# Patient Record
Sex: MALE | Race: BLACK OR AFRICAN AMERICAN | NOT HISPANIC OR LATINO | Employment: OTHER | URBAN - METROPOLITAN AREA
[De-identification: names, ages, dates, MRNs, and addresses within clinical notes are randomized per-mention and may not be internally consistent; named-entity substitution may affect disease eponyms.]

---

## 2017-03-13 ENCOUNTER — HOSPITAL ENCOUNTER (OUTPATIENT)
Dept: CARDIOLOGY | Facility: CLINIC | Age: 62
Discharge: HOME | End: 2017-03-13
Payer: MEDICAID

## 2017-03-13 DIAGNOSIS — F10.10 ETOH ABUSE: ICD-10-CM

## 2017-03-13 DIAGNOSIS — R09.89 CARDIAC LV EJECTION FRACTION 10-20%: ICD-10-CM

## 2017-03-13 DIAGNOSIS — I10 ESSENTIAL HYPERTENSION: ICD-10-CM

## 2017-03-13 DIAGNOSIS — R06.02 SOB (SHORTNESS OF BREATH): ICD-10-CM

## 2017-03-13 LAB
DIASTOLIC DYSFUNCTION: YES
ESTIMATED PA SYSTOLIC PRESSURE: 55
MITRAL VALVE REGURGITATION: ABNORMAL
RETIRED EF AND QEF - SEE NOTES: 15 (ref 55–65)
TRICUSPID VALVE REGURGITATION: ABNORMAL

## 2017-03-13 PROCEDURE — 93306 TTE W/DOPPLER COMPLETE: CPT | Mod: PBBFAC | Performed by: INTERNAL MEDICINE

## 2017-03-13 PROCEDURE — 0399T 2D ECHO WITH COLOR FLOW DOPPLER: CPT | Mod: S$PBB,,, | Performed by: INTERNAL MEDICINE

## 2017-11-03 ENCOUNTER — HOSPITAL ENCOUNTER (OUTPATIENT)
Facility: HOSPITAL | Age: 62
Discharge: LEFT BEFORE DISCHARGE | End: 2017-11-05
Attending: EMERGENCY MEDICINE | Admitting: HOSPITALIST
Payer: MEDICAID

## 2017-11-03 DIAGNOSIS — I50.9 CHF (CONGESTIVE HEART FAILURE): ICD-10-CM

## 2017-11-03 DIAGNOSIS — I50.9 CHF EXACERBATION: ICD-10-CM

## 2017-11-03 DIAGNOSIS — I86.8 VARICOSE VEINS OF OTHER SPECIFIED SITES (CODE): ICD-10-CM

## 2017-11-03 DIAGNOSIS — R06.02 SHORTNESS OF BREATH: ICD-10-CM

## 2017-11-03 PROBLEM — D64.9 ANEMIA: Status: ACTIVE | Noted: 2017-11-03

## 2017-11-03 PROBLEM — I83.899 BLEEDING FROM VARICOSE VEIN: Status: ACTIVE | Noted: 2017-11-03

## 2017-11-03 PROBLEM — E87.6 HYPOKALEMIA: Status: ACTIVE | Noted: 2017-11-03

## 2017-11-03 LAB
ABO + RH BLD: NORMAL
ALBUMIN SERPL BCP-MCNC: 3.1 G/DL
ALP SERPL-CCNC: 132 U/L
ALT SERPL W/O P-5'-P-CCNC: 12 U/L
ANION GAP SERPL CALC-SCNC: 10 MMOL/L
AST SERPL-CCNC: 32 U/L
BASOPHILS # BLD AUTO: 0.04 K/UL
BASOPHILS NFR BLD: 0.8 %
BILIRUB SERPL-MCNC: 1.4 MG/DL
BLD GP AB SCN CELLS X3 SERPL QL: NORMAL
BNP SERPL-MCNC: 1658 PG/ML
BUN SERPL-MCNC: 12 MG/DL
CALCIUM SERPL-MCNC: 8.8 MG/DL
CHLORIDE SERPL-SCNC: 107 MMOL/L
CO2 SERPL-SCNC: 21 MMOL/L
CREAT SERPL-MCNC: 1.3 MG/DL
DIFFERENTIAL METHOD: ABNORMAL
EOSINOPHIL # BLD AUTO: 0.1 K/UL
EOSINOPHIL NFR BLD: 2.3 %
ERYTHROCYTE [DISTWIDTH] IN BLOOD BY AUTOMATED COUNT: 14.5 %
EST. GFR  (AFRICAN AMERICAN): >60 ML/MIN/1.73 M^2
EST. GFR  (NON AFRICAN AMERICAN): 57.7 ML/MIN/1.73 M^2
GLUCOSE SERPL-MCNC: 69 MG/DL
HCT VFR BLD AUTO: 33 %
HGB BLD-MCNC: 11.2 G/DL
IMM GRANULOCYTES # BLD AUTO: 0.01 K/UL
IMM GRANULOCYTES NFR BLD AUTO: 0.2 %
LYMPHOCYTES # BLD AUTO: 1 K/UL
LYMPHOCYTES NFR BLD: 20.2 %
MCH RBC QN AUTO: 33 PG
MCHC RBC AUTO-ENTMCNC: 33.9 G/DL
MCV RBC AUTO: 97 FL
MONOCYTES # BLD AUTO: 0.6 K/UL
MONOCYTES NFR BLD: 13.1 %
NEUTROPHILS # BLD AUTO: 3 K/UL
NEUTROPHILS NFR BLD: 63.4 %
NRBC BLD-RTO: 0 /100 WBC
PLATELET # BLD AUTO: 131 K/UL
PMV BLD AUTO: 10.3 FL
POTASSIUM SERPL-SCNC: 3.3 MMOL/L
PROT SERPL-MCNC: 8.1 G/DL
RBC # BLD AUTO: 3.39 M/UL
SODIUM SERPL-SCNC: 138 MMOL/L
TROPONIN I SERPL DL<=0.01 NG/ML-MCNC: 0.06 NG/ML
WBC # BLD AUTO: 4.75 K/UL

## 2017-11-03 PROCEDURE — G0378 HOSPITAL OBSERVATION PER HR: HCPCS

## 2017-11-03 PROCEDURE — 25000003 PHARM REV CODE 250: Performed by: PHYSICIAN ASSISTANT

## 2017-11-03 PROCEDURE — 84484 ASSAY OF TROPONIN QUANT: CPT

## 2017-11-03 PROCEDURE — 83880 ASSAY OF NATRIURETIC PEPTIDE: CPT

## 2017-11-03 PROCEDURE — 99220 PR INITIAL OBSERVATION CARE,LEVL III: CPT | Mod: ,,, | Performed by: PHYSICIAN ASSISTANT

## 2017-11-03 PROCEDURE — 99285 EMERGENCY DEPT VISIT HI MDM: CPT

## 2017-11-03 PROCEDURE — 63600175 PHARM REV CODE 636 W HCPCS: Performed by: PHYSICIAN ASSISTANT

## 2017-11-03 PROCEDURE — 93010 ELECTROCARDIOGRAM REPORT: CPT | Mod: ,,, | Performed by: INTERNAL MEDICINE

## 2017-11-03 PROCEDURE — 99284 EMERGENCY DEPT VISIT MOD MDM: CPT | Mod: ,,, | Performed by: HOSPITALIST

## 2017-11-03 PROCEDURE — 86900 BLOOD TYPING SEROLOGIC ABO: CPT

## 2017-11-03 PROCEDURE — 86901 BLOOD TYPING SEROLOGIC RH(D): CPT

## 2017-11-03 PROCEDURE — 83735 ASSAY OF MAGNESIUM: CPT

## 2017-11-03 PROCEDURE — 85025 COMPLETE CBC W/AUTO DIFF WBC: CPT

## 2017-11-03 PROCEDURE — 80053 COMPREHEN METABOLIC PANEL: CPT

## 2017-11-03 PROCEDURE — 25000003 PHARM REV CODE 250: Performed by: EMERGENCY MEDICINE

## 2017-11-03 PROCEDURE — 93005 ELECTROCARDIOGRAM TRACING: CPT

## 2017-11-03 RX ORDER — FUROSEMIDE 10 MG/ML
40 INJECTION INTRAMUSCULAR; INTRAVENOUS 2 TIMES DAILY
Status: DISCONTINUED | OUTPATIENT
Start: 2017-11-04 | End: 2017-11-05 | Stop reason: HOSPADM

## 2017-11-03 RX ORDER — FUROSEMIDE 40 MG/1
40 TABLET ORAL DAILY
Status: DISCONTINUED | OUTPATIENT
Start: 2017-11-04 | End: 2017-11-04

## 2017-11-03 RX ORDER — ONDANSETRON 2 MG/ML
4 INJECTION INTRAMUSCULAR; INTRAVENOUS EVERY 12 HOURS PRN
Status: DISCONTINUED | OUTPATIENT
Start: 2017-11-03 | End: 2017-11-05 | Stop reason: HOSPADM

## 2017-11-03 RX ORDER — POTASSIUM CHLORIDE 20 MEQ/1
40 TABLET, EXTENDED RELEASE ORAL ONCE
Status: COMPLETED | OUTPATIENT
Start: 2017-11-04 | End: 2017-11-04

## 2017-11-03 RX ORDER — CARVEDILOL 3.12 MG/1
3.12 TABLET ORAL 2 TIMES DAILY
Status: DISCONTINUED | OUTPATIENT
Start: 2017-11-03 | End: 2017-11-05 | Stop reason: HOSPADM

## 2017-11-03 RX ORDER — POTASSIUM CHLORIDE 750 MG/1
10 CAPSULE, EXTENDED RELEASE ORAL DAILY
Status: DISCONTINUED | OUTPATIENT
Start: 2017-11-04 | End: 2017-11-05 | Stop reason: HOSPADM

## 2017-11-03 RX ORDER — FUROSEMIDE 10 MG/ML
80 INJECTION INTRAMUSCULAR; INTRAVENOUS ONCE
Status: COMPLETED | OUTPATIENT
Start: 2017-11-03 | End: 2017-11-03

## 2017-11-03 RX ORDER — SPIRONOLACTONE 25 MG/1
25 TABLET ORAL DAILY
Status: DISCONTINUED | OUTPATIENT
Start: 2017-11-04 | End: 2017-11-05 | Stop reason: HOSPADM

## 2017-11-03 RX ORDER — PRAVASTATIN SODIUM 40 MG/1
40 TABLET ORAL DAILY
Status: DISCONTINUED | OUTPATIENT
Start: 2017-11-04 | End: 2017-11-05 | Stop reason: HOSPADM

## 2017-11-03 RX ORDER — GLUCAGON 1 MG
1 KIT INJECTION
Status: DISCONTINUED | OUTPATIENT
Start: 2017-11-03 | End: 2017-11-05 | Stop reason: HOSPADM

## 2017-11-03 RX ORDER — IBUPROFEN 200 MG
24 TABLET ORAL
Status: DISCONTINUED | OUTPATIENT
Start: 2017-11-03 | End: 2017-11-05 | Stop reason: HOSPADM

## 2017-11-03 RX ORDER — LISINOPRIL 2.5 MG/1
2.5 TABLET ORAL DAILY
Status: DISCONTINUED | OUTPATIENT
Start: 2017-11-04 | End: 2017-11-05 | Stop reason: HOSPADM

## 2017-11-03 RX ORDER — LIDOCAINE HYDROCHLORIDE AND EPINEPHRINE 10; 10 MG/ML; UG/ML
1 INJECTION, SOLUTION INFILTRATION; PERINEURAL ONCE
Status: COMPLETED | OUTPATIENT
Start: 2017-11-03 | End: 2017-11-03

## 2017-11-03 RX ORDER — ONDANSETRON 8 MG/1
8 TABLET, ORALLY DISINTEGRATING ORAL EVERY 8 HOURS PRN
Status: DISCONTINUED | OUTPATIENT
Start: 2017-11-03 | End: 2017-11-05 | Stop reason: HOSPADM

## 2017-11-03 RX ORDER — ACETAMINOPHEN 325 MG/1
650 TABLET ORAL EVERY 4 HOURS PRN
Status: DISCONTINUED | OUTPATIENT
Start: 2017-11-03 | End: 2017-11-05 | Stop reason: HOSPADM

## 2017-11-03 RX ORDER — IBUPROFEN 200 MG
16 TABLET ORAL
Status: DISCONTINUED | OUTPATIENT
Start: 2017-11-03 | End: 2017-11-05 | Stop reason: HOSPADM

## 2017-11-03 RX ADMIN — FUROSEMIDE 80 MG: 10 INJECTION, SOLUTION INTRAVENOUS at 11:11

## 2017-11-03 RX ADMIN — LIDOCAINE HYDROCHLORIDE,EPINEPHRINE BITARTRATE 1 ML: 10; .01 INJECTION, SOLUTION INFILTRATION; PERINEURAL at 08:11

## 2017-11-03 RX ADMIN — CARVEDILOL 3.12 MG: 3.12 TABLET, FILM COATED ORAL at 11:11

## 2017-11-04 PROBLEM — D50.9 IRON DEFICIENCY ANEMIA: Status: ACTIVE | Noted: 2017-11-03

## 2017-11-04 PROBLEM — E87.6 HYPOKALEMIA: Status: RESOLVED | Noted: 2017-11-03 | Resolved: 2017-11-04

## 2017-11-04 PROBLEM — R79.89 ELEVATED TROPONIN: Status: ACTIVE | Noted: 2017-11-04

## 2017-11-04 LAB
ANION GAP SERPL CALC-SCNC: 10 MMOL/L
BUN SERPL-MCNC: 14 MG/DL
CALCIUM SERPL-MCNC: 8.5 MG/DL
CHLORIDE SERPL-SCNC: 105 MMOL/L
CO2 SERPL-SCNC: 24 MMOL/L
CREAT SERPL-MCNC: 1.3 MG/DL
EST. GFR  (AFRICAN AMERICAN): >60 ML/MIN/1.73 M^2
EST. GFR  (NON AFRICAN AMERICAN): 57.7 ML/MIN/1.73 M^2
FERRITIN SERPL-MCNC: 38 NG/ML
FOLATE SERPL-MCNC: 9.4 NG/ML
GLUCOSE SERPL-MCNC: 99 MG/DL
INR PPP: 1.4
IRON SERPL-MCNC: 65 UG/DL
MAGNESIUM SERPL-MCNC: 1.6 MG/DL
MAGNESIUM SERPL-MCNC: 1.7 MG/DL
POTASSIUM SERPL-SCNC: 3.6 MMOL/L
PROTHROMBIN TIME: 14.6 SEC
SATURATED IRON: 14 %
SODIUM SERPL-SCNC: 139 MMOL/L
TOTAL IRON BINDING CAPACITY: 462 UG/DL
TRANSFERRIN SERPL-MCNC: 312 MG/DL
TROPONIN I SERPL DL<=0.01 NG/ML-MCNC: 0.04 NG/ML
TROPONIN I SERPL DL<=0.01 NG/ML-MCNC: 0.06 NG/ML
VIT B12 SERPL-MCNC: 468 PG/ML

## 2017-11-04 PROCEDURE — 83540 ASSAY OF IRON: CPT

## 2017-11-04 PROCEDURE — 83735 ASSAY OF MAGNESIUM: CPT

## 2017-11-04 PROCEDURE — 63600175 PHARM REV CODE 636 W HCPCS: Performed by: PHYSICIAN ASSISTANT

## 2017-11-04 PROCEDURE — 36415 COLL VENOUS BLD VENIPUNCTURE: CPT

## 2017-11-04 PROCEDURE — 80048 BASIC METABOLIC PNL TOTAL CA: CPT

## 2017-11-04 PROCEDURE — 35226 REPAIR BLOOD VESSEL DIR LXTR: CPT

## 2017-11-04 PROCEDURE — G0378 HOSPITAL OBSERVATION PER HR: HCPCS

## 2017-11-04 PROCEDURE — 84484 ASSAY OF TROPONIN QUANT: CPT | Mod: 91

## 2017-11-04 PROCEDURE — 25000003 PHARM REV CODE 250: Performed by: PHYSICIAN ASSISTANT

## 2017-11-04 PROCEDURE — 82746 ASSAY OF FOLIC ACID SERUM: CPT

## 2017-11-04 PROCEDURE — 82728 ASSAY OF FERRITIN: CPT

## 2017-11-04 PROCEDURE — 85610 PROTHROMBIN TIME: CPT

## 2017-11-04 PROCEDURE — 82607 VITAMIN B-12: CPT

## 2017-11-04 PROCEDURE — 99225 PR SUBSEQUENT OBSERVATION CARE,LEVEL II: CPT | Mod: ,,, | Performed by: PHYSICIAN ASSISTANT

## 2017-11-04 PROCEDURE — 84484 ASSAY OF TROPONIN QUANT: CPT

## 2017-11-04 RX ORDER — HEPARIN SODIUM 5000 [USP'U]/ML
5000 INJECTION, SOLUTION INTRAVENOUS; SUBCUTANEOUS EVERY 8 HOURS
Status: DISCONTINUED | OUTPATIENT
Start: 2017-11-04 | End: 2017-11-05 | Stop reason: HOSPADM

## 2017-11-04 RX ORDER — FERROUS SULFATE 325(65) MG
325 TABLET, DELAYED RELEASE (ENTERIC COATED) ORAL DAILY
Status: DISCONTINUED | OUTPATIENT
Start: 2017-11-04 | End: 2017-11-05 | Stop reason: HOSPADM

## 2017-11-04 RX ADMIN — FERROUS SULFATE TAB EC 325 MG (65 MG FE EQUIVALENT) 325 MG: 325 (65 FE) TABLET DELAYED RESPONSE at 02:11

## 2017-11-04 RX ADMIN — POTASSIUM CHLORIDE 10 MEQ: 750 CAPSULE, EXTENDED RELEASE ORAL at 09:11

## 2017-11-04 RX ADMIN — LISINOPRIL 2.5 MG: 2.5 TABLET ORAL at 09:11

## 2017-11-04 RX ADMIN — CARVEDILOL 3.12 MG: 3.12 TABLET, FILM COATED ORAL at 09:11

## 2017-11-04 RX ADMIN — FUROSEMIDE 40 MG: 10 INJECTION, SOLUTION INTRAVENOUS at 05:11

## 2017-11-04 RX ADMIN — HEPARIN SODIUM 5000 UNITS: 5000 INJECTION, SOLUTION INTRAVENOUS; SUBCUTANEOUS at 09:11

## 2017-11-04 RX ADMIN — ACETAMINOPHEN 650 MG: 325 TABLET ORAL at 09:11

## 2017-11-04 RX ADMIN — SPIRONOLACTONE 25 MG: 25 TABLET, FILM COATED ORAL at 09:11

## 2017-11-04 RX ADMIN — PRAVASTATIN SODIUM 40 MG: 40 TABLET ORAL at 09:11

## 2017-11-04 RX ADMIN — POTASSIUM CHLORIDE 40 MEQ: 1500 TABLET, EXTENDED RELEASE ORAL at 04:11

## 2017-11-04 RX ADMIN — FUROSEMIDE 40 MG: 10 INJECTION, SOLUTION INTRAVENOUS at 09:11

## 2017-11-04 NOTE — H&P
[facility] Hospital Medicine  History & Physical    Patient Name: Jovan Hernandez  MRN: [id number]  Admission Date: 11/3/2017  Attending Physician: Estrellita Daly MD   Primary Care Provider: Eusebia Brady NP    [facility] Medicine Team: [facility] MED  Timothy Singer PA-C     Patient information was obtained from patient, past medical records and ER records.     Subjective:     Principal Problem:Acute exacerbation of CHF (congestive heart failure)    Chief Complaint:   Chief Complaint   Patient presents with    Leg Injury     pt presents to the ed with bleeding from the leg        HPI: Jovan Hernandez is a 62M with PMHx of HTN, CAD, combined CHF, aortic dissection s/p repair who presents for evaluation of bleeding varicose vein and was subsequently noted to be fluid overloaded by EDMD. In brief, patient had spontaneous rupture of varicose vein today at lunch. He had difficulty controlling bleeding and presented to ED. This has occurred previously and resolved with pressure. Denies taking NSAIDS, ASA, no thinners. He was sutured in the ED, tourniquet was applied and bleeding stopped.     The patient was noted to be fluid overloaded by EDMD and when questioned about any other symptoms he did endorse some SOB, FOWLER, and orthopnea. He does not overtly complain of these things however. He has been out of his CHF medications for several months 2/2 running out of his prescriptions. He is not compliant with dietary restrictions. He denies any CP, NVD, fever, chills, cough, dysuria.    Past Medical History:   Diagnosis Date    Aortic dissection     [place] Type A     Cardiac defibrillator in place     CHF (congestive heart failure) 12/15/14    EF 15-20%    Coronary artery disease     ETOH abuse     Hepatitis C antibody test positive     Hypertension     S/P aortic dissection repair 2011       Past Surgical History:   Procedure Laterality Date    CORONARY ARTERY BYPASS GRAFT         Review  of patient's allergies indicates:  No Known Allergies    No current facility-administered medications on file prior to encounter.      Current Outpatient Prescriptions on File Prior to Encounter   Medication Sig    carvedilol (COREG) 3.125 MG tablet Take 1 tablet (3.125 mg total) by mouth 2 (two) times daily.    furosemide (LASIX) 40 MG tablet Take one pill twice a day. Take one additional dose if weight increases more than 2 lbs in one day.    lisinopril (PRINIVIL,ZESTRIL) 2.5 MG tablet Take 1 tablet (2.5 mg total) by mouth once daily.    pravastatin (PRAVACHOL) 40 MG tablet Take 1 tablet (40 mg total) by mouth once daily.    spironolactone (ALDACTONE) 25 MG tablet Take 1 tablet (25 mg total) by mouth once daily.     Family History     None        Social History Main Topics    Smoking status: Current Every Day Smoker    Smokeless tobacco: Never Used    Alcohol use 0.0 oz/week     1 - 2 drink(s) per week    Drug use: Unknown    Sexual activity: Not on file     Review of Systems   Constitutional: Negative for chills, diaphoresis and fever.   Respiratory: Positive for shortness of breath. Negative for cough and wheezing.    Cardiovascular: Positive for leg swelling. Negative for chest pain and palpitations.   Gastrointestinal: Positive for abdominal distention. Negative for abdominal pain, constipation, nausea and vomiting.   Genitourinary: Negative for difficulty urinating and dysuria.   Musculoskeletal: Negative for arthralgias and back pain.   Skin: Positive for wound. Negative for rash.   Neurological: Negative for dizziness, tremors, weakness and headaches.   Hematological: Bruises/bleeds easily.   Psychiatric/Behavioral: Negative for agitation and confusion.     Objective:     Vital Signs (Most Recent):  Temp: 97.6 °F (36.4 °C) (11/03/17 2235)  Pulse: 91 (11/03/17 2235)  Resp: 18 (11/03/17 2235)  BP: (!) 132/97 (11/03/17 2235)  SpO2: 100 % (11/03/17 2235) Vital Signs (24h Range):  Temp:  [97.6 °F (36.4  °C)-98.2 °F (36.8 °C)] 97.6 °F (36.4 °C)  Pulse:  [86-92] 91  Resp:  [18] 18  SpO2:  [98 %-100 %] 100 %  BP: (129-136)/(86-97) 132/97     Weight: 68 kg (150 lb)  Body mass index is 23.49 kg/m².    Physical Exam   Constitutional: He is oriented to person, place, and time. He appears well-developed and well-nourished. No distress.   HENT:   Head: Normocephalic and atraumatic.   Eyes: EOM are normal. Pupils are equal, round, and reactive to light.   Neck: Normal range of motion. Neck supple. JVD (to mandible) present.   Cardiovascular: Normal rate and regular rhythm.    Murmur heard.  Pulmonary/Chest: Effort normal and breath sounds normal. No respiratory distress. He has no wheezes.   Bibasilar crackles   Abdominal: Soft. Bowel sounds are normal. He exhibits distension. There is no tenderness. There is no guarding.   Musculoskeletal: Normal range of motion. He exhibits edema (3+ pitting BLE).   Neurological: He is alert and oriented to person, place, and time. No cranial nerve deficit.   Skin: Skin is warm and dry. He is not diaphoretic.   tourniquet applied to left thigh, no bleeding through bandage   Psychiatric: He has a normal mood and affect. His behavior is normal. Judgment and thought content normal.   Nursing note and vitals reviewed.       Significant Labs:   CBC:   Recent Labs  Lab 11/03/17 2016   WBC 4.75   HGB 11.2*   HCT 33.0*   *     CMP:   Recent Labs  Lab 11/03/17 2016      K 3.3*      CO2 21*   GLU 69*   BUN 12   CREATININE 1.3   CALCIUM 8.8   PROT 8.1   ALBUMIN 3.1*   BILITOT 1.4*   ALKPHOS 132   AST 32   ALT 12   ANIONGAP 10   EGFRNONAA 57.7*     Cardiac Markers:   Recent Labs  Lab 11/03/17 2016   BNP 1,658*     Troponin:   Recent Labs  Lab 11/03/17 2016   TROPONINI 0.057*     TSH: No results for input(s): TSH in the last 4320 hours.  Urine Culture: No results for input(s): LABURIN in the last 48 hours.  Urine Studies: No results for input(s): COLORU, APPEARANCEUA, PHUR,  SPECGRAV, PROTEINUA, GLUCUA, KETONESU, BILIRUBINUA, OCCULTUA, NITRITE, UROBILINOGEN, LEUKOCYTESUR, RBCUA, WBCUA, BACTERIA, SQUAMEPITHEL, HYALINECASTS in the last 48 hours.    Invalid input(s): ANI    Significant Imaging: CXR: I have reviewed all pertinent results/findings within the past 24 hours and my personal findings are:  no acute findings  EKG: I have reviewed all pertinent results/findings within the past 24 hours and my personal findings are: no ischemic changes    Assessment/Plan:     * Acute exacerbation of CHF (congestive heart failure)    - Patient with subjective SOB in setting of non-compliance with medications for several months.   - Grossly fluid overloaded on exam. +JVD, + abd distension, 3+ pitting edema, endorses mild SOB, FOWLER, orthopnea but not reason for presentation today. He does not acutely complain of the above. CXR without significant edema, VSS, good on RA  - give lasix 80 mg IV now, continue 40 mv IV BID tomorrow, transition to oral once euvolemic (home dose lasix 40 mg daily)  - monitor improvement with daily weights, strict I/Os  - last ECHO 03/2017: EF 15%, +DD, PAP 55  - continue BB, ACE        Bleeding from varicose vein    - controlled in ED, reason for presentation  - if bleeding re-occurs will need vascular consult  - suture removal in 7-10 days from 11/3  - check PT/INR given Hep C        Hypokalemia    - replace, check Mg  - start daily supplementation given aggressive diuresis        Anemia    - check iron panel, B12, folate  - last H/H from 2 years ago, 13.3/39, uknown baseline  - on admit 11.2/33  - HDS, do not suspect substantial blood loss from vein bleeding        Non compliance w medication regimen    - PCP follow up, send out with Rx for HF meds        CAD (coronary artery disease)    - no CP, + mild SOB, EKG without new ischemic changes  - trop at 0.057, but appears his baseline from 2 years ago (0.034)  - follow in AM to verify flat/downtrend        Hypertension     - controlled on admit          VTE Risk Mitigation         Ordered     Medium Risk of VTE  Once      11/03/17 2250     Low Risk of VTE  Once      11/03/17 2250             Timothy Singer PA-C  Department of Hospital Medicine   [facility]

## 2017-11-04 NOTE — ED PROVIDER NOTES
Encounter Date: 11/3/2017       History     Chief Complaint   Patient presents with    Leg Injury     pt presents to the ed with bleeding from the leg     This is a 62-year-old male with history of aortic dissection, CHF with EF of 15-20% with AICD in place, CAD, alcohol abuse and hepatitis C presenting to the ER brought in by EMS for varicose vein bleeding from left leg that started acutely while patient was eating dinner this afternoon.  No recent,.  Per EMS on arrival patient had active pooling of blood was spurting out of the area called and was applied.  Patient was estimated to lose approximately 2 50 cc of blood.  On review of system patient also endorses shortness of breath for the past few weeks which she states is getting worse.  He has not been taking antiacids medications for past few months.  Patient is a very poor historian much history is given per his daughter.  Patient has a history of 1 prior varicose bleed which stopped with compression at home.  Patient never sought medical attention for this in the past.  He is not currently in any blood thinners.          Review of patient's allergies indicates:  No Known Allergies  Past Medical History:   Diagnosis Date    Aortic dissection     [place] Type A     Cardiac defibrillator in place     CHF (congestive heart failure) 12/15/14    EF 15-20%    Coronary artery disease     ETOH abuse     Hepatitis C antibody test positive     Hypertension     S/P aortic dissection repair 2011     Past Surgical History:   Procedure Laterality Date    CORONARY ARTERY BYPASS GRAFT       History reviewed. No pertinent family history.  Social History   Substance Use Topics    Smoking status: Current Every Day Smoker    Smokeless tobacco: Never Used    Alcohol use 0.0 oz/week     1 - 2 drink(s) per week     Review of Systems   Constitutional: Negative for fever.   HENT: Negative for sore throat.    Respiratory: Positive for shortness of breath. Negative for  wheezing and stridor.    Cardiovascular: Negative for chest pain.   Gastrointestinal: Negative for nausea.   Genitourinary: Negative for dysuria.   Musculoskeletal: Negative for back pain.   Skin: Negative for rash.   Neurological: Negative for weakness.   Hematological: Bruises/bleeds easily.       Physical Exam     Initial Vitals [11/03/17 1957]   BP Pulse Resp Temp SpO2   (!) 136/92 86 18 98.2 °F (36.8 °C) 100 %      MAP       106.67         Physical Exam    Constitutional: He is not diaphoretic. No distress.   Chronically ill-appearing male in no acute distress   HENT:   Head: Normocephalic and atraumatic.   Mouth/Throat: Oropharynx is clear and moist.   Eyes: Conjunctivae and EOM are normal. Pupils are equal, round, and reactive to light.   Neck: Normal range of motion. Neck supple.   Cardiovascular: Normal rate, regular rhythm, normal heart sounds and intact distal pulses. Exam reveals no gallop and no friction rub.    No murmur heard.  AICD in place   Pulmonary/Chest: Breath sounds normal. No respiratory distress. He has no wheezes. He has no rhonchi. He has no rales.   Abdominal: Soft. Bowel sounds are normal. He exhibits distension. There is no tenderness. There is no rebound and no guarding.   Musculoskeletal: Normal range of motion. He exhibits no edema or tenderness.   Active bleeding from left varicose vein to lateral aspect of left leg behind knee     Neurological: He is alert and oriented to person, place, and time. He has normal strength. No cranial nerve deficit or sensory deficit.   Skin: Skin is warm and dry. No rash noted. No pallor.         ED Course   Lac Repair  Date/Time: 11/4/2017 6:40 AM  Performed by: ADILIA KELSEY  Authorized by: AXEL GIBSON   Consent Done: Emergent Situation  Wound length (cm): active bleeding for varicose vein.  Foreign bodies: no foreign bodies  Vascular damage: yes    Anesthesia:  Local Anesthetic: lidocaine 2% with epinephrine  Anesthetic total: 4  mL  Patient sedated: no  Preparation: Patient was prepped and draped in the usual sterile fashion.  Fascia closure: 4-0 Vicryl  Number of sutures: 1 (figure eight)  Technique: complex  Approximation: close  Approximation difficulty: complex  Dressing: quick clot and coban.  Patient tolerance: Patient tolerated the procedure well with no immediate complications        Labs Reviewed   CBC W/ AUTO DIFFERENTIAL - Abnormal; Notable for the following:        Result Value    RBC 3.39 (*)     Hemoglobin 11.2 (*)     Hematocrit 33.0 (*)     MCH 33.0 (*)     Platelets 131 (*)     All other components within normal limits   COMPREHENSIVE METABOLIC PANEL - Abnormal; Notable for the following:     Potassium 3.3 (*)     CO2 21 (*)     Glucose 69 (*)     Albumin 3.1 (*)     Total Bilirubin 1.4 (*)     eGFR if non  57.7 (*)     All other components within normal limits   TROPONIN I - Abnormal; Notable for the following:     Troponin I 0.057 (*)     All other components within normal limits   B-TYPE NATRIURETIC PEPTIDE - Abnormal; Notable for the following:     BNP 1,658 (*)     All other components within normal limits   TYPE & SCREEN     EKG Readings: (Independently Interpreted)   Initial Reading: No STEMI. Previous EKG: Compared with most recent EKG Heart Rate: 95.       X-Rays:   Independently Interpreted Readings:   Other Readings:  Chest x-ray: Cardiomegaly, no pulmonary edema    Medical Decision Making:   History:   Old Medical Records: I decided to obtain old medical records.  Old Records Summarized: records from previous admission(s).  Independently Interpreted Test(s):   I have ordered and independently interpreted X-rays - see prior notes.  I have ordered and independently interpreted EKG Reading(s) - see prior notes  Clinical Tests:   Lab Tests: Ordered and Reviewed  Radiological Study: Ordered and Reviewed  Medical Tests: Ordered and Reviewed  Other:   I have discussed this case with another health care  provider.       <> Summary of the Discussion: Hospital medicine       APC / Resident Notes:   This is a 62-year-old gentleman presenting with varicose vein bleed from left leg.  On arrival to the ER bleeding was controlled with coban.  Coban was removed and patient had active bleed from puncture site in varicose vein.  Pressure was applied and vein was grabbed with Keli clamps and figure-of-eight suture with Vicryl was placed bleeding was controlled thereafter and crit clot was applied with Coban dressing.  Patient was also worked up for CHF exacerbation given he has been off his medications for several weeks and has worsening shortness of breath.  Chest x-ray notable for pulmonary edema, BNP acutely elevated from baseline in the 1000's.  No acute ischemic changes on EKG were noted.  Patient is admitted to medicine for CHF exacerbation and will need to be consulted to vascular in the morning for evaluation of recurrent very close vein bleeding.       Scribe Attestation:   Comments: I, Dr. Maday Marino, personally performed the services described in this documentation. All medical record entries made by the scribe were at my direction and in my presence.  I have reviewed the chart and agree that the record reflects my personal performance and is accurate and complete. Maday Marino MD.  5:31 PM 11/05/2017    Attending Attestation:   Physician Attestation Statement for Resident:  As the supervising MD   Physician Attestation Statement: I have personally seen and examined this patient.   I agree with the above history. -:   As the supervising MD I agree with the above PE.   -: Gen.: No acute distress  Lungs: Clear to auscultation  Cardiac: Normal  Abdomen: Distended, soft  Extremity: Bleeding varicose vein in the left lateral leg   As the supervising MD I agree with the above treatment, course, plan, and disposition.   -: Labs reviewed significant for elevated BNP and troponin likely secondary to hypervolemia.   Patient will be placed in observation for diuresis  I have reviewed and agree with the residents interpretation of the following: lab data, EKG and x-rays.                    ED Course      Clinical Impression:   Diagnoses of Shortness of breath, CHF exacerbation, and CHF (congestive heart failure) were pertinent to this visit.    Disposition:   Disposition: Admitted  Condition: Jefferson Vega MD  Resident  11/04/17 0643       Maday Marino MD  11/05/17 7057

## 2017-11-04 NOTE — ASSESSMENT & PLAN NOTE
In setting of non-compliance with medications for several months  - Grossly fluid overloaded on admit; +JVD, + abd distension, 3+ pitting edema, endorses mild SOB, FOWLER, orthopnea but not reason for presentation today. He does not acutely complain of the above.   - CXR without significant edema, VSS, good on RA  - BNP 1658  - Lasix 80 mg IV on admit  - Continue 40 mv IV BID. Plan to transition to oral once euvolemic (home dose lasix 40 mg daily)  - Monitor improvement with daily weights, strict I/Os  - Last ECHO 03/2017: EF 15%, +DD, PAP 55; ordered repeat  - Continue BB, ACE

## 2017-11-04 NOTE — ASSESSMENT & PLAN NOTE
- controlled in ED, reason for presentation  - if bleeding re-occurs will need vascular consult  - suture removal in 7-10 days from 11/3  - check PT/INR given Hep C

## 2017-11-04 NOTE — ED TRIAGE NOTES
"Jovan Hernandez, a 62 y.o. male presents to the ED arriving by  EMS from home complaining of bleeding from the back of his left knee. Pt states he was eating dinner when he noticed "blood spraying across the room." EMS states they believe he has lost around 250mL of blood.     Chief Complaint   Patient presents with    Leg Injury     pt presents to the ed with bleeding from the leg     Review of patient's allergies indicates:  No Known Allergies  Past Medical History:   Diagnosis Date    Aortic dissection     [place] Type A     Cardiac defibrillator in place     CHF (congestive heart failure) 12/15/14    EF 15-20%    Coronary artery disease     ETOH abuse     Hepatitis C antibody test positive     Hypertension     S/P aortic dissection repair 2011       "

## 2017-11-04 NOTE — SUBJECTIVE & OBJECTIVE
Past Medical History:   Diagnosis Date    Aortic dissection     [place] Type A     Cardiac defibrillator in place     CHF (congestive heart failure) 12/15/14    EF 15-20%    Coronary artery disease     ETOH abuse     Hepatitis C antibody test positive     Hypertension     S/P aortic dissection repair 2011       Past Surgical History:   Procedure Laterality Date    CORONARY ARTERY BYPASS GRAFT         Review of patient's allergies indicates:  No Known Allergies    No current facility-administered medications on file prior to encounter.      Current Outpatient Prescriptions on File Prior to Encounter   Medication Sig    carvedilol (COREG) 3.125 MG tablet Take 1 tablet (3.125 mg total) by mouth 2 (two) times daily.    furosemide (LASIX) 40 MG tablet Take one pill twice a day. Take one additional dose if weight increases more than 2 lbs in one day.    lisinopril (PRINIVIL,ZESTRIL) 2.5 MG tablet Take 1 tablet (2.5 mg total) by mouth once daily.    pravastatin (PRAVACHOL) 40 MG tablet Take 1 tablet (40 mg total) by mouth once daily.    spironolactone (ALDACTONE) 25 MG tablet Take 1 tablet (25 mg total) by mouth once daily.     Family History     None        Social History Main Topics    Smoking status: Current Every Day Smoker    Smokeless tobacco: Never Used    Alcohol use 0.0 oz/week     1 - 2 drink(s) per week    Drug use: Unknown    Sexual activity: Not on file     Review of Systems   Constitutional: Negative for chills, diaphoresis and fever.   Respiratory: Positive for shortness of breath. Negative for cough and wheezing.    Cardiovascular: Positive for leg swelling. Negative for chest pain and palpitations.   Gastrointestinal: Positive for abdominal distention. Negative for abdominal pain, constipation, nausea and vomiting.   Genitourinary: Negative for difficulty urinating and dysuria.   Musculoskeletal: Negative for arthralgias and back pain.   Skin: Positive for wound. Negative for rash.    Neurological: Negative for dizziness, tremors, weakness and headaches.   Hematological: Bruises/bleeds easily.   Psychiatric/Behavioral: Negative for agitation and confusion.     Objective:     Vital Signs (Most Recent):  Temp: 97.6 °F (36.4 °C) (11/03/17 2235)  Pulse: 91 (11/03/17 2235)  Resp: 18 (11/03/17 2235)  BP: (!) 132/97 (11/03/17 2235)  SpO2: 100 % (11/03/17 2235) Vital Signs (24h Range):  Temp:  [97.6 °F (36.4 °C)-98.2 °F (36.8 °C)] 97.6 °F (36.4 °C)  Pulse:  [86-92] 91  Resp:  [18] 18  SpO2:  [98 %-100 %] 100 %  BP: (129-136)/(86-97) 132/97     Weight: 68 kg (150 lb)  Body mass index is 23.49 kg/m².    Physical Exam   Constitutional: He is oriented to person, place, and time. He appears well-developed and well-nourished. No distress.   HENT:   Head: Normocephalic and atraumatic.   Eyes: EOM are normal. Pupils are equal, round, and reactive to light.   Neck: Normal range of motion. Neck supple. JVD (to mandible) present.   Cardiovascular: Normal rate and regular rhythm.    Murmur heard.  Pulmonary/Chest: Effort normal and breath sounds normal. No respiratory distress. He has no wheezes.   Bibasilar crackles   Abdominal: Soft. Bowel sounds are normal. He exhibits distension. There is no tenderness. There is no guarding.   Musculoskeletal: Normal range of motion. He exhibits edema (3+ pitting BLE).   Neurological: He is alert and oriented to person, place, and time. No cranial nerve deficit.   Skin: Skin is warm and dry. He is not diaphoretic.   tourniquet applied to left thigh, no bleeding through bandage   Psychiatric: He has a normal mood and affect. His behavior is normal. Judgment and thought content normal.   Nursing note and vitals reviewed.       Significant Labs:   CBC:   Recent Labs  Lab 11/03/17 2016   WBC 4.75   HGB 11.2*   HCT 33.0*   *     CMP:   Recent Labs  Lab 11/03/17 2016      K 3.3*      CO2 21*   GLU 69*   BUN 12   CREATININE 1.3   CALCIUM 8.8   PROT 8.1   ALBUMIN  3.1*   BILITOT 1.4*   ALKPHOS 132   AST 32   ALT 12   ANIONGAP 10   EGFRNONAA 57.7*     Cardiac Markers:   Recent Labs  Lab 11/03/17 2016   BNP 1,658*     Troponin:   Recent Labs  Lab 11/03/17 2016   TROPONINI 0.057*     TSH: No results for input(s): TSH in the last 4320 hours.  Urine Culture: No results for input(s): LABURIN in the last 48 hours.  Urine Studies: No results for input(s): COLORU, APPEARANCEUA, PHUR, SPECGRAV, PROTEINUA, GLUCUA, KETONESU, BILIRUBINUA, OCCULTUA, NITRITE, UROBILINOGEN, LEUKOCYTESUR, RBCUA, WBCUA, BACTERIA, SQUAMEPITHEL, HYALINECASTS in the last 48 hours.    Invalid input(s): WRIGHTSUR    Significant Imaging: CXR: I have reviewed all pertinent results/findings within the past 24 hours and my personal findings are:  no acute findings  EKG: I have reviewed all pertinent results/findings within the past 24 hours and my personal findings are: no ischemic changes

## 2017-11-04 NOTE — ASSESSMENT & PLAN NOTE
- Pt denies chest pain; mild SOB in setting of CHF exacerbation  - EKG without new ischemic changes

## 2017-11-04 NOTE — ASSESSMENT & PLAN NOTE
- Last H/H from 2 years ago, 13.3/39, [facility] baseline  - 11.2/33 on admit  - HDS, do not suspect substantial blood loss from vein bleeding  - B12 and folate WNL  - Will trend daily

## 2017-11-04 NOTE — SUBJECTIVE & OBJECTIVE
Interval History: No acute events overnight. This AM, pt sitting upright in bed resting comfortably. Pt has no complaints at this time. Discussed plan of care.    Review of Systems   Constitutional: Negative for chills, diaphoresis and fever.   Respiratory: Positive for shortness of breath. Negative for cough and wheezing.    Cardiovascular: Positive for leg swelling. Negative for chest pain and palpitations.   Gastrointestinal: Positive for abdominal distention. Negative for abdominal pain, constipation, nausea and vomiting.   Skin: Positive for wound. Negative for rash.   Hematological: Bruises/bleeds easily.     Objective:     Vital Signs (Most Recent):  Temp: 98.5 °F (36.9 °C) (11/04/17 1540)  Pulse: 82 (11/04/17 1540)  Resp: 16 (11/04/17 0755)  BP: 106/70 (11/04/17 1540)  SpO2: 95 % (11/04/17 1540) Vital Signs (24h Range):  Temp:  [97.6 °F (36.4 °C)-98.5 °F (36.9 °C)] 98.5 °F (36.9 °C)  Pulse:  [79-93] 82  Resp:  [16-18] 16  SpO2:  [95 %-100 %] 95 %  BP: (106-136)/(69-97) 106/70     Weight: 68 kg (150 lb)  Body mass index is 23.49 kg/m².    Intake/Output Summary (Last 24 hours) at 11/04/17 1710  Last data filed at 11/04/17 1356   Gross per 24 hour   Intake              610 ml   Output             1900 ml   Net            -1290 ml      Physical Exam   Constitutional: He is oriented to person, place, and time. He appears well-developed and well-nourished. No distress.   Neck: Normal range of motion. Neck supple. JVD (to mandible) present.   Cardiovascular: Normal rate and regular rhythm.    Murmur heard.  Pulmonary/Chest: Effort normal and breath sounds normal. No respiratory distress. He has no wheezes.   Bibasilar crackles   Abdominal: Soft. Bowel sounds are normal. He exhibits distension (improving). There is no tenderness. There is no guarding.   Musculoskeletal: Normal range of motion. He exhibits edema (improved).   Neurological: He is alert and oriented to person, place, and time. No cranial nerve deficit.    Skin: Skin is warm and dry. He is not diaphoretic.   tourniquet applied to left thigh, no bleeding through bandage   Psychiatric: He has a normal mood and affect. His behavior is normal. Judgment and thought content normal.   Nursing note and vitals reviewed.      Significant Labs: All pertinent labs within the past 24 hours have been reviewed.    Significant Imaging: I have reviewed all pertinent imaging results/findings within the past 24 hours.

## 2017-11-04 NOTE — PROGRESS NOTES
[facility] Hospital Medicine  Progress Note    Patient Name: Jovan Hernandez  MRN: [id number]  Patient Class: OP- Observation   Admission Date: 11/3/2017  Length of Stay: 0 days  Attending Physician: Estrellita Daly MD  Primary Care Provider: Eusebia Brady NP    [facility] Medicine Team: [facility] MED F Monique Rivas PA-C    Subjective:     Principal Problem:Acute exacerbation of CHF (congestive heart failure)    HPI:  Jovan Hernandez is a 62M with PMHx of HTN, CAD, combined CHF, aortic dissection s/p repair who presents for evaluation of bleeding varicose vein and was subsequently noted to be fluid overloaded by EDMD. In brief, patient had spontaneous rupture of varicose vein today at lunch. He had difficulty controlling bleeding and presented to ED. This has occurred previously and resolved with pressure. Denies taking NSAIDS, ASA, no thinners. He was sutured in the ED, tourniquet was applied and bleeding stopped.     The patient was noted to be fluid overloaded by EDMD and when questioned about any other symptoms he did endorse some SOB, FOWLER, and orthopnea. He does not overtly complain of these things however. He has been out of his CHF medications for several months 2/2 running out of his prescriptions. He is not compliant with dietary restrictions. He denies any CP, NVD, fever, chills, cough, dysuria.    Hospital Course:  Pt admitted to observation for CHF exacerbation in setting of non-compliance with medications for several months. BNP 1658. CXR without significant edema. Continue IV lasix 40 mg BID. Strict I/Os. Ordered repeat 2d CFD.     Interval History: No acute events overnight. This AM, pt sitting upright in bed resting comfortably. Pt has no complaints at this time. Discussed plan of care.    Review of Systems   Constitutional: Negative for chills, diaphoresis and fever.   Respiratory: Positive for shortness of breath. Negative for cough and wheezing.    Cardiovascular:  Positive for leg swelling. Negative for chest pain and palpitations.   Gastrointestinal: Positive for abdominal distention. Negative for abdominal pain, constipation, nausea and vomiting.   Skin: Positive for wound. Negative for rash.   Hematological: Bruises/bleeds easily.     Objective:     Vital Signs (Most Recent):  Temp: 98.5 °F (36.9 °C) (11/04/17 1540)  Pulse: 82 (11/04/17 1540)  Resp: 16 (11/04/17 0755)  BP: 106/70 (11/04/17 1540)  SpO2: 95 % (11/04/17 1540) Vital Signs (24h Range):  Temp:  [97.6 °F (36.4 °C)-98.5 °F (36.9 °C)] 98.5 °F (36.9 °C)  Pulse:  [79-93] 82  Resp:  [16-18] 16  SpO2:  [95 %-100 %] 95 %  BP: (106-136)/(69-97) 106/70     Weight: 68 kg (150 lb)  Body mass index is 23.49 kg/m².    Intake/Output Summary (Last 24 hours) at 11/04/17 1710  Last data filed at 11/04/17 1356   Gross per 24 hour   Intake              610 ml   Output             1900 ml   Net            -1290 ml      Physical Exam   Constitutional: He is oriented to person, place, and time. He appears well-developed and well-nourished. No distress.   Neck: Normal range of motion. Neck supple. JVD (to mandible) present.   Cardiovascular: Normal rate and regular rhythm.    Murmur heard.  Pulmonary/Chest: Effort normal and breath sounds normal. No respiratory distress. He has no wheezes.   Bibasilar crackles   Abdominal: Soft. Bowel sounds are normal. He exhibits distension (improving). There is no tenderness. There is no guarding.   Musculoskeletal: Normal range of motion. He exhibits edema (improved).   Neurological: He is alert and oriented to person, place, and time. No cranial nerve deficit.   Skin: Skin is warm and dry. He is not diaphoretic.   tourniquet applied to left thigh, no bleeding through bandage   Psychiatric: He has a normal mood and affect. His behavior is normal. Judgment and thought content normal.   Nursing note and vitals reviewed.      Significant Labs: All pertinent labs within the past 24 hours have been  reviewed.    Significant Imaging: I have reviewed all pertinent imaging results/findings within the past 24 hours.    Assessment/Plan:      * Acute exacerbation of CHF (congestive heart failure)    In setting of non-compliance with medications for several months  - Grossly fluid overloaded on admit; +JVD, + abd distension, 3+ pitting edema, endorses mild SOB, FOWLER, orthopnea but not reason for presentation today. He does not acutely complain of the above.   - CXR without significant edema, VSS, good on RA  - BNP 1658  - Lasix 80 mg IV on admit  - Continue 40 mv IV BID. Plan to transition to oral once euvolemic (home dose lasix 40 mg daily)  - Monitor improvement with daily weights, strict I/Os  - Last ECHO 03/2017: EF 15%, +DD, PAP 55; ordered repeat  - Continue BB, ACE        Elevated troponin    In setting of acute CHF exacerbation  - Pt denies chest pain and anginal equivalents  - EKG without new ischemic changes  - Trop flat x3; 0.057->0.062->0.045          Anemia    - Last H/H from 2 years ago, 13.3/39, uknown baseline  - 11.2/33 on admit  - HDS, do not suspect substantial blood loss from vein bleeding  - B12 and folate WNL  - Will trend daily        CAD (coronary artery disease)    - Pt denies chest pain; mild SOB in setting of CHF exacerbation  - EKG without new ischemic changes        Hypertension    - BP controlled on admit  - Will need rx on discharge  - Continue to monitor        Bleeding from varicose vein    - Controlled in ED, reason for presentation  - if bleeding re-occurs will need vascular consult  - Suture removal in 7-10 days from 11/3        Non compliance w medication regimen    - Will need PCP follow up  - Send out with Rx for HF meds          VTE Risk Mitigation         Ordered     heparin (porcine) injection 5,000 Units  Every 8 hours     Route:  Subcutaneous        11/04/17 1721     Medium Risk of VTE  Once      11/03/17 2250              Monique Rivas PA-C  Department of Hospital Medicine    [facility]  Discussed with staff, Dr. Daly

## 2017-11-04 NOTE — ASSESSMENT & PLAN NOTE
- no CP, + mild SOB, EKG without new ischemic changes  - trop at 0.057, but appears his baseline from 2 years ago (0.034)  - follow in AM to verify flat/downtrend

## 2017-11-04 NOTE — ASSESSMENT & PLAN NOTE
In setting of acute CHF exacerbation  - Pt denies chest pain and anginal equivalents  - EKG without new ischemic changes  - Trop flat x3; 0.057->0.062->0.045

## 2017-11-04 NOTE — ASSESSMENT & PLAN NOTE
- [facility]  - 11.2/33 on admit  - HDS, do not suspect substantial blood loss from vein bleeding  - B12 and folate WNL  - Started iron supplementation  11/5: Left AMA

## 2017-11-04 NOTE — ASSESSMENT & PLAN NOTE
- Patient with subjective SOB in setting of non-compliance with medications for several months.   - Grossly fluid overloaded on exam. +JVD, + abd distension, 3+ pitting edema, endorses mild SOB, FOWLER, orthopnea but not reason for presentation today. He does not acutely complain of the above. CXR without significant edema, VSS, good on RA  - give lasix 80 mg IV now, continue 40 mv IV BID tomorrow, transition to oral once euvolemic (home dose lasix 40 mg daily)  - monitor improvement with daily weights, strict I/Os  - last ECHO 03/2017: EF 15%, +DD, PAP 55  - continue BB, ACE

## 2017-11-04 NOTE — ASSESSMENT & PLAN NOTE
- Controlled in ED, reason for presentation  - if bleeding re-occurs will need vascular consult  - Suture removal in 7-10 days from 11/3

## 2017-11-04 NOTE — HOSPITAL COURSE
Pt admitted to observation for CHF exacerbation in setting of non-compliance with medications for several months. BNP 1658. CXR without significant edema. Continue IV lasix 40 mg BID. Strict I/Os. Ordered repeat 2d CFD. 11/5: Pt left AMA without notifying nursing or provider. Pt left without signing AMA form, education, prescriptions, or hospital follow up.

## 2017-11-04 NOTE — ED NOTES
Patient identifiers verified and correct for Jovan Hernandez.    LOC: The patient is awake, alert and aware of environment with an appropriate affect, the patient is oriented x 3 and speaking appropriately.  APPEARANCE: Patient resting comfortably and in no acute distress, patient is clean and well groomed, patient's clothing is properly fastened.  SKIN: The skin is warm and dry, color consistent with ethnicity, patient has normal skin turgor and moist mucus membranes, skin intact, no breakdown or bruising noted.  MUSCULOSKELETAL: Patient moving all extremities spontaneously, no obvious swelling or deformities noted.  RESPIRATORY: Airway is open and patent, respirations are spontaneous, patient has a normal effort and rate, no accessory muscle use noted, bilateral breath sounds even and clear.  CARDIAC: Patient has a normal rate and regular rhythm, pitting periphreal edema noted to bilateral lower extrmities, capillary refill < 3 seconds.  ABDOMEN: Soft and non tender to palpation, no distention noted, normoactive bowel sounds present in all four quadrants.  NEUROLOGIC: Pupils equal bilaterally, eyes open spontaneously, behavior appropriate to situation, follows commands, facial expression symmetrical, bilateral hand grasp equal and even, purposeful motor response noted, normal sensation in all extremities when touched with a finger.    Pt has bleeding from the lateral side to the left knee that is active. MD at bedside

## 2017-11-04 NOTE — ASSESSMENT & PLAN NOTE
- check iron panel, B12, folate  - last H/H from 2 years ago, 13.3/39, [facility] baseline  - on admit 11.2/33  - HDS, do not suspect substantial blood loss from vein bleeding

## 2017-11-04 NOTE — PLAN OF CARE
Problem: Patient Care Overview  Goal: Plan of Care Review  Outcome: Ongoing (interventions implemented as appropriate)  Pt resting quietly at present time. Pt currently afebrile. VS stable. Progressing towards goals as expected. Will continue to monitor patient.

## 2017-11-04 NOTE — HPI
Jovan Hernandez is a 62M with PMHx of HTN, CAD, combined CHF, aortic dissection s/p repair who presents for evaluation of bleeding varicose vein and was subsequently noted to be fluid overloaded by EDMD. In brief, patient had spontaneous rupture of varicose vein today at lunch. He had difficulty controlling bleeding and presented to ED. This has occurred previously and resolved with pressure. Denies taking NSAIDS, ASA, no thinners. He was sutured in the ED, tourniquet was applied and bleeding stopped.     The patient was noted to be fluid overloaded by EDMD and when questioned about any other symptoms he did endorse some SOB, FOWLER, and orthopnea. He does not overtly complain of these things however. He has been out of his CHF medications for several months 2/2 running out of his prescriptions. He is not compliant with dietary restrictions. He denies any CP, NVD, fever, chills, cough, dysuria.

## 2017-11-05 VITALS
TEMPERATURE: 98 F | BODY MASS INDEX: 23.54 KG/M2 | WEIGHT: 150 LBS | OXYGEN SATURATION: 99 % | DIASTOLIC BLOOD PRESSURE: 73 MMHG | HEIGHT: 67 IN | RESPIRATION RATE: 16 BRPM | HEART RATE: 82 BPM | SYSTOLIC BLOOD PRESSURE: 113 MMHG

## 2017-11-05 LAB
ANION GAP SERPL CALC-SCNC: 9 MMOL/L
BASOPHILS # BLD AUTO: 0.02 K/UL
BASOPHILS NFR BLD: 0.5 %
BUN SERPL-MCNC: 19 MG/DL
CALCIUM SERPL-MCNC: 8.9 MG/DL
CHLORIDE SERPL-SCNC: 101 MMOL/L
CO2 SERPL-SCNC: 28 MMOL/L
CREAT SERPL-MCNC: 1.1 MG/DL
DIFFERENTIAL METHOD: ABNORMAL
EOSINOPHIL # BLD AUTO: 0.1 K/UL
EOSINOPHIL NFR BLD: 2.8 %
ERYTHROCYTE [DISTWIDTH] IN BLOOD BY AUTOMATED COUNT: 14.5 %
EST. GFR  (AFRICAN AMERICAN): >60 ML/MIN/1.73 M^2
EST. GFR  (NON AFRICAN AMERICAN): >60 ML/MIN/1.73 M^2
ESTIMATED PA SYSTOLIC PRESSURE: 54.19
GLUCOSE SERPL-MCNC: 87 MG/DL
HCT VFR BLD AUTO: 29.9 %
HGB BLD-MCNC: 10.4 G/DL
IMM GRANULOCYTES # BLD AUTO: 0.01 K/UL
IMM GRANULOCYTES NFR BLD AUTO: 0.2 %
LYMPHOCYTES # BLD AUTO: 0.9 K/UL
LYMPHOCYTES NFR BLD: 20.4 %
MAGNESIUM SERPL-MCNC: 1.6 MG/DL
MCH RBC QN AUTO: 33 PG
MCHC RBC AUTO-ENTMCNC: 34.8 G/DL
MCV RBC AUTO: 95 FL
MITRAL VALVE REGURGITATION: ABNORMAL
MONOCYTES # BLD AUTO: 0.5 K/UL
MONOCYTES NFR BLD: 10.9 %
NEUTROPHILS # BLD AUTO: 2.8 K/UL
NEUTROPHILS NFR BLD: 65.2 %
NRBC BLD-RTO: 0 /100 WBC
PLATELET # BLD AUTO: 107 K/UL
PMV BLD AUTO: 11 FL
POTASSIUM SERPL-SCNC: 3.5 MMOL/L
RBC # BLD AUTO: 3.15 M/UL
RETIRED EF AND QEF - SEE NOTES: 10 (ref 55–65)
SODIUM SERPL-SCNC: 138 MMOL/L
TRICUSPID VALVE REGURGITATION: ABNORMAL
WBC # BLD AUTO: 4.22 K/UL

## 2017-11-05 PROCEDURE — 99225 PR SUBSEQUENT OBSERVATION CARE,LEVEL II: CPT | Mod: ,,, | Performed by: PHYSICIAN ASSISTANT

## 2017-11-05 PROCEDURE — 25000003 PHARM REV CODE 250: Performed by: PHYSICIAN ASSISTANT

## 2017-11-05 PROCEDURE — 63600175 PHARM REV CODE 636 W HCPCS: Performed by: PHYSICIAN ASSISTANT

## 2017-11-05 PROCEDURE — G0378 HOSPITAL OBSERVATION PER HR: HCPCS

## 2017-11-05 PROCEDURE — 93306 TTE W/DOPPLER COMPLETE: CPT

## 2017-11-05 PROCEDURE — 93306 TTE W/DOPPLER COMPLETE: CPT | Mod: 26,,, | Performed by: INTERNAL MEDICINE

## 2017-11-05 PROCEDURE — 85025 COMPLETE CBC W/AUTO DIFF WBC: CPT

## 2017-11-05 PROCEDURE — 80048 BASIC METABOLIC PNL TOTAL CA: CPT

## 2017-11-05 PROCEDURE — 36415 COLL VENOUS BLD VENIPUNCTURE: CPT

## 2017-11-05 PROCEDURE — 83735 ASSAY OF MAGNESIUM: CPT

## 2017-11-05 RX ADMIN — FUROSEMIDE 40 MG: 10 INJECTION, SOLUTION INTRAVENOUS at 09:11

## 2017-11-05 RX ADMIN — HEPARIN SODIUM 5000 UNITS: 5000 INJECTION, SOLUTION INTRAVENOUS; SUBCUTANEOUS at 06:11

## 2017-11-05 RX ADMIN — SPIRONOLACTONE 25 MG: 25 TABLET, FILM COATED ORAL at 09:11

## 2017-11-05 RX ADMIN — FERROUS SULFATE TAB EC 325 MG (65 MG FE EQUIVALENT) 325 MG: 325 (65 FE) TABLET DELAYED RESPONSE at 09:11

## 2017-11-05 RX ADMIN — PRAVASTATIN SODIUM 40 MG: 40 TABLET ORAL at 09:11

## 2017-11-05 RX ADMIN — LISINOPRIL 2.5 MG: 2.5 TABLET ORAL at 09:11

## 2017-11-05 RX ADMIN — CARVEDILOL 3.12 MG: 3.12 TABLET, FILM COATED ORAL at 09:11

## 2017-11-05 RX ADMIN — POTASSIUM CHLORIDE 10 MEQ: 750 CAPSULE, EXTENDED RELEASE ORAL at 09:11

## 2017-11-05 NOTE — PROGRESS NOTES
Pt still not back to room. Unit secretary stated she seen pt go to elevators. Pt frequently leaves unit but generally returns in timely manner. THIERNO Rivas aware of pt still not on unit. Notified Xavier Charge nurse. Called and spoke to officer Kvng from security. Security looking for pt. Description provided. wctm.

## 2017-11-05 NOTE — PROGRESS NOTES
Attempted to contact pt via home and mobile phone listed. The call went straight to voicemail after multiple attempts. Emergency contact listed the same phone number. Floor RNRupal notified.

## 2017-11-05 NOTE — PROGRESS NOTES
Evelyn PA asked this nurse if the pt was back in room. Pt was not. Notified PA that she will be paged when he is.

## 2017-11-05 NOTE — DISCHARGE SUMMARY
[facility] Hospital Medicine  Discharge Summary      Patient Name: Jovan Hernandez  MRN: [id number]  Admission Date: 11/3/2017  Hospital Length of Stay: 0 days  Discharge Date and Time: 11/5/2017  2:53 PM  Attending Physician: No att. providers found   Discharging Provider: Monique Rivas PA-C  Primary Care Provider: Eusebia Brady NP  Hospital Medicine Team: [facility] F Monique Rivas PA-C    HPI:   Jovan Hernandez is a 62M with PMHx of HTN, CAD, combined CHF, aortic dissection s/p repair who presents for evaluation of bleeding varicose vein and was subsequently noted to be fluid overloaded by EDMD. In brief, patient had spontaneous rupture of varicose vein today at lunch. He had difficulty controlling bleeding and presented to ED. This has occurred previously and resolved with pressure. Denies taking NSAIDS, ASA, no thinners. He was sutured in the ED, tourniquet was applied and bleeding stopped.     The patient was noted to be fluid overloaded by EDMD and when questioned about any other symptoms he did endorse some SOB, FOWLER, and orthopnea. He does not overtly complain of these things however. He has been out of his CHF medications for several months 2/2 running out of his prescriptions. He is not compliant with dietary restrictions. He denies any CP, NVD, fever, chills, cough, dysuria.    * No surgery found *      Hospital Course:   Pt admitted to observation for CHF exacerbation in setting of non-compliance with medications for several months. BNP 1658. CXR without significant edema. Continue IV lasix 40 mg BID. Strict I/Os. Ordered repeat 2d CFD. 11/5: Pt left AMA without notifying nursing or provider. Pt left without signing AMA form, education, prescriptions, or hospital follow up.        * Acute exacerbation of CHF (congestive heart failure)    In setting of non-compliance with medications for several months  - Grossly fluid overloaded on admit; +JVD, + abd distension, 3+ pitting  edema, endorses mild SOB, FOWLER, orthopnea but not reason for presentation today. He does not acutely complain of the above.   - CXR without significant edema, VSS, good on RA  - BNP 1658  - Lasix 80 mg IV on admit  - Continue 40 mv IV BID. Plan to transition to oral once euvolemic (home dose lasix 40 mg daily)  - Monitor improvement with daily weights, strict I/Os  - Last ECHO 03/2017: EF 15%, +DD, PAP 55; ordered repeat  - Continue BB, ACE  11/5: Left AMA without notifying provider or signing form        Elevated troponin    In setting of acute CHF exacerbation  - Pt denies chest pain and anginal equivalents  - EKG without new ischemic changes  - Trop flat x3; 0.057->0.062->0.045  11/5: Left AMA         Iron deficiency anemia    - Last H/H from 2 years ago, 13.3/39, uknown baseline  - 11.2/33 on admit  - HDS, do not suspect substantial blood loss from vein bleeding  - B12 and folate WNL  - Started iron supplementation  11/5: Left AMA        CAD (coronary artery disease)    - Pt denies chest pain; mild SOB in setting of CHF exacerbation  - EKG without new ischemic changes  11/5: Left AMA         Hypertension    - BP controlled on admit  - Will need rx on discharge  - Continue to monitor  11/5: Left AMA         Bleeding from varicose vein    - Controlled in ED, reason for presentation  - if bleeding re-occurs will need vascular consult  - Suture removal in 7-10 days from 11/3  11/5: Left AMA         Non compliance w medication regimen    - Will need PCP follow up  - Send out with Rx for HF meds  11/5: Left AMA           Final Active Diagnoses:    Diagnosis Date Noted POA    PRINCIPAL PROBLEM:  Acute exacerbation of CHF (congestive heart failure) [I50.9]  Yes    Elevated troponin [R74.8] 11/04/2017 Yes    Iron deficiency anemia [D50.9] 11/03/2017 Yes    Hypertension [I10] 12/15/2014 Yes    CAD (coronary artery disease) [I25.10]  Yes    Bleeding from varicose vein [I83.899] 11/03/2017 Yes    Non compliance w  medication regimen [Z91.14]  Not Applicable      Problems Resolved During this Admission:    Diagnosis Date Noted Date Resolved POA    Hypokalemia [E87.6] 11/03/2017 11/04/2017 Yes       Discharged Condition: against medical advice    Disposition: Left Against Medical Adv*    Follow Up: Left before follow up could be arranged    Patient Instructions:   No discharge procedures on file.    Significant Diagnostic Studies: Labs:   Troponin   Recent Labs  Lab 11/04/17  1410   TROPONINI 0.045*     Radiology: X-Ray: CXR    Cardiac Graphics: ECG and Echocardiogram:   2D echo with color flow doppler:   Results for orders placed or performed during the hospital encounter of 03/13/17   2D echo with color flow doppler   Result Value Ref Range    EF 15 (A) 55 - 65    Mitral Valve Regurgitation MODERATE (A)     Diastolic Dysfunction Yes (A)     Est. PA Systolic Pressure 55 (A)     Tricuspid Valve Regurgitation MODERATE (A)        Pending Diagnostic Studies:     Procedure Component Value Units Date/Time    2D echo with color flow doppler [[id number]] Collected:  11/05/17 1005    Order Status:  Sent Lab Status:  In process Updated:  11/05/17 1012    Narrative:       This study is in progress....         Medications:  Reconciled Home Medications:   Discharge Medication List as of 11/5/2017  2:54 PM      CONTINUE these medications which have NOT CHANGED    Details   carvedilol (COREG) 3.125 MG tablet Take 1 tablet (3.125 mg total) by mouth 2 (two) times daily., Starting 12/17/2014, Until Thu 12/17/15, Print      furosemide (LASIX) 40 MG tablet Take one pill twice a day. Take one additional dose if weight increases more than 2 lbs in one day., Print      lisinopril (PRINIVIL,ZESTRIL) 2.5 MG tablet Take 1 tablet (2.5 mg total) by mouth once daily., Starting 12/18/2014, Until Fri 12/18/15, Print      pravastatin (PRAVACHOL) 40 MG tablet Take 1 tablet (40 mg total) by mouth once daily., Starting 12/17/2014, Until Thu 12/17/15, Print       spironolactone (ALDACTONE) 25 MG tablet Take 1 tablet (25 mg total) by mouth once daily., Starting 12/17/2014, Until Thu 12/17/15, Print             Indwelling Lines/Drains at time of discharge:   Lines/Drains/Airways          No matching active lines, drains, or airways          Time spent on the discharge of patient: 30 minutes         Monique Rivas PA-C  Department of Hospital Medicine  [facility]

## 2017-11-05 NOTE — NURSING
Uneventful night. Fluid restrictions maintained. No complaints of sob or dyspnea on exertion.Vital signs stable. Fall/safety precautions maintained. Bed in lowest position, call bell in reach.

## 2017-11-05 NOTE — ASSESSMENT & PLAN NOTE
- Controlled in ED, reason for presentation  - if bleeding re-occurs will need vascular consult  - Suture removal in 7-10 days from 11/3  11/5: Left AMA

## 2017-11-05 NOTE — ASSESSMENT & PLAN NOTE
- Pt denies chest pain; mild SOB in setting of CHF exacerbation  - EKG without new ischemic changes  11/5: Left AMA

## 2017-11-05 NOTE — ASSESSMENT & PLAN NOTE
In setting of non-compliance with medications for several months  - Grossly fluid overloaded on admit; +JVD, + abd distension, 3+ pitting edema, endorses mild SOB, FOWLER, orthopnea but not reason for presentation today. He does not acutely complain of the above.   - CXR without significant edema, VSS, good on RA  - BNP 1658  - Lasix 80 mg IV on admit  - Continue 40 mv IV BID. Plan to transition to oral once euvolemic (home dose lasix 40 mg daily)  - Monitor improvement with daily weights, strict I/Os  - Last ECHO 03/2017: EF 15%, +DD, PAP 55; ordered repeat  - Continue BB, ACE  11/5: Left AMA without notifying provider or signing form

## 2017-11-05 NOTE — ASSESSMENT & PLAN NOTE
In setting of acute CHF exacerbation  - Pt denies chest pain and anginal equivalents  - EKG without new ischemic changes  - Trop flat x3; 0.057->0.062->0.045  11/5: Left AMA

## 2017-11-05 NOTE — PROGRESS NOTES
Pt left hospital AMA. Pt left with PIV in place to right arm. Pt has been off unit floor for 2 hours. Charge Nurse and Evelyn PA notified.

## 2020-10-31 ENCOUNTER — HOSPITAL ENCOUNTER (EMERGENCY)
Facility: HOSPITAL | Age: 65
Discharge: HOME | End: 2020-10-31
Attending: EMERGENCY MEDICINE
Payer: MEDICARE

## 2020-10-31 VITALS
RESPIRATION RATE: 18 BRPM | DIASTOLIC BLOOD PRESSURE: 72 MMHG | OXYGEN SATURATION: 98 % | SYSTOLIC BLOOD PRESSURE: 107 MMHG | TEMPERATURE: 98 F | HEART RATE: 82 BPM

## 2020-10-31 DIAGNOSIS — R58 VENOUS BLEED: Primary | ICD-10-CM

## 2020-10-31 PROCEDURE — 25000003 PHARM REV CODE 250: Performed by: PHYSICIAN ASSISTANT

## 2020-10-31 PROCEDURE — 99283 EMERGENCY DEPT VISIT LOW MDM: CPT | Mod: 25

## 2020-10-31 PROCEDURE — 99284 PR EMERGENCY DEPT VISIT,LEVEL IV: ICD-10-PCS | Mod: 25,,, | Performed by: EMERGENCY MEDICINE

## 2020-10-31 PROCEDURE — 12031 INTMD RPR S/A/T/EXT 2.5 CM/<: CPT | Mod: ,,, | Performed by: EMERGENCY MEDICINE

## 2020-10-31 PROCEDURE — 12031 PR LAYR CLOS WND TRUNK,ARM,LEG <2.5 CM: ICD-10-PCS | Mod: ,,, | Performed by: EMERGENCY MEDICINE

## 2020-10-31 PROCEDURE — 12001 RPR S/N/AX/GEN/TRNK 2.5CM/<: CPT | Mod: LT

## 2020-10-31 PROCEDURE — 99284 EMERGENCY DEPT VISIT MOD MDM: CPT | Mod: 25,,, | Performed by: EMERGENCY MEDICINE

## 2020-10-31 RX ORDER — HYDROXYZINE PAMOATE 25 MG/1
25 CAPSULE ORAL
Status: COMPLETED | OUTPATIENT
Start: 2020-10-31 | End: 2020-10-31

## 2020-10-31 RX ORDER — LIDOCAINE HYDROCHLORIDE AND EPINEPHRINE 10; 10 MG/ML; UG/ML
10 INJECTION, SOLUTION INFILTRATION; PERINEURAL
Status: COMPLETED | OUTPATIENT
Start: 2020-10-31 | End: 2020-10-31

## 2020-10-31 RX ADMIN — HYDROXYZINE PAMOATE 25 MG: 25 CAPSULE ORAL at 05:10

## 2020-10-31 RX ADMIN — LIDOCAINE HYDROCHLORIDE AND EPINEPHRINE 10 ML: 10; 10 INJECTION, SOLUTION INFILTRATION; PERINEURAL at 05:10

## 2020-10-31 NOTE — ED PROVIDER NOTES
Encounter Date: 10/31/2020       History     Chief Complaint   Patient presents with    Varicose Veins Rupture     Pt arrives via EMS for ruptured varicose vein.     Patient is a 65 year old male with PMHX of hepatitis C, c-CHF with 10%EF, CAD, moderate mitral valve regurgitation, moderate tricuspid valve regurgitation, HTN, HLD, and pulmonary HTN. He presents to the ED via EMS for leg wound. Patient reports scratching left foot today and then having persistent bleeding from left foot. Denies hx of anticoagulation. Reports compliance with lasix. He denies fever,chills, nausea, vomiting, sob, chest pain, abd pain, dysuria, diarrhea, or constipation. He is a current everyday smoker and denies alcohol use.    The history is provided by the patient and medical records. No  was used.     Review of patient's allergies indicates:  No Known Allergies  Past Medical History:   Diagnosis Date    Aortic dissection     Juan Carlos Type A     Cardiac defibrillator in place     CHF (congestive heart failure) 12/15/14    EF 15-20%    Coronary artery disease     ETOH abuse     Hepatitis C antibody test positive     Hypertension     S/P aortic dissection repair 2011     Past Surgical History:   Procedure Laterality Date    CORONARY ARTERY BYPASS GRAFT       No family history on file.  Social History     Tobacco Use    Smoking status: Current Every Day Smoker    Smokeless tobacco: Never Used   Substance Use Topics    Alcohol use: Yes     Alcohol/week: 0.0 standard drinks     Types: 1 - 2 drink(s) per week    Drug use: Not on file     Review of Systems   Constitutional: Negative for fever.   HENT: Negative for sore throat.    Respiratory: Negative for shortness of breath.    Cardiovascular: Negative for chest pain.   Gastrointestinal: Negative for abdominal pain, nausea and vomiting.   Genitourinary: Negative for dysuria.   Musculoskeletal: Negative for back pain.   Skin: Positive for wound. Negative for  rash.        Pruritus.   Neurological: Negative for weakness.   Hematological: Does not bruise/bleed easily.       Physical Exam     Initial Vitals   BP Pulse Resp Temp SpO2   10/31/20 0154 10/31/20 0154 10/31/20 0154 10/31/20 0157 10/31/20 0154   110/70 102 18 97.9 °F (36.6 °C) 99 %      MAP       --                Physical Exam    Vitals reviewed.  Constitutional: He appears well-developed and well-nourished. No distress.   HENT:   Head: Normocephalic.   Eyes: Conjunctivae are normal.   Neck: Normal range of motion. JVD present.   Cardiovascular: Normal rate and regular rhythm.   Murmur heard.  Pulmonary/Chest: Breath sounds normal. No respiratory distress. He has no wheezes. He has no rales.   Abdominal: Soft. Bowel sounds are normal. He exhibits no distension. There is no abdominal tenderness.   Musculoskeletal: Normal range of motion. Edema (2+pitting edema of b/l LE.) present.   Neurological: He is alert and oriented to person, place, and time.   Skin: Skin is warm and dry. Abrasion (pin point bleeding wound over dorsum of left foot.) noted. No laceration noted.         ED Course   Lac Repair    Date/Time: 10/31/2020 4:32 PM  Performed by: Aby Hercules PA-C  Authorized by: Jorge Luis Lilly MD     Consent:     Consent obtained:  Verbal    Consent given by:  Patient  Laceration details:     Location:  Foot    Foot location:  Top of L foot    Length (cm):  0.5  Repair type:     Repair type:  Intermediate  Pre-procedure details:     Preparation:  Patient was prepped and draped in usual sterile fashion  Exploration:     Hemostasis achieved with:  Epinephrine and direct pressure  Skin repair:     Repair method:  Sutures    Suture size:  5-0    Suture material:  Nylon    Suture technique:  Figure eight    Number of sutures:  1  Approximation:     Approximation:  Close  Post-procedure details:     Dressing:  Open (no dressing)    Patient tolerance of procedure:  Tolerated well, no immediate  complications      Labs Reviewed - No data to display          Medical Decision Making:   History:   Old Medical Records: I decided to obtain old medical records.       APC / Resident Notes:   Patient is a 65 year old male presents to the ED for emergent evaluation of leg wound.     Hemostasis achieved using lidocaine with epi and figure 8 suture as detailed in the procedure note. Will continue to monitor.     Differential diagnoses include, but are not limited to: laceration, venous insuffiencey, neuropathy, or contusion.     Patient is hemodynamically stable, without increased work of breathing. No evidence of bleeding after wound repair. I have discussed emergency department findings, and plan with the patient. Will discharge home with F/U with PCP in approximately one week. Explicit wound care instructions discussed.  Return to ED in 5 days for suture removal or sooner for signs of infection or complication.  Additional verbal discharge instructions were given and discussed with the patient. Patient verbalizes understanding of plan and agrees. Return precautions given.    I have discussed and reviewed with my supervising physician.        Clinical Impression:     ICD-10-CM ICD-9-CM   1. Venous bleed  R58 459.0                      Disposition:   Disposition: Discharged  Condition: Stable     ED Disposition Condition    Discharge Stable        ED Prescriptions     None        Follow-up Information     Follow up With Specialties Details Why Contact Info Additional Information    Eusebia Brady NP Family Medicine Schedule an appointment as soon as possible for a visit in 1 week  [facility]  [place] [address]  [phone]       [facility] - Dermatology [address] [place] [address] Dermatology Schedule an appointment as soon as possible for a visit in 1 week  [address]  [facility] [address] [phone] Dermatology - Main Building, Clinic 11th Floor Please park in [facility]. Use Clinic elevators 12 & 13  to get to the 11th floor                                       Aby Hercules PA-C  10/31/20 9298

## 2020-10-31 NOTE — ED TRIAGE NOTES
Patient identifiers for Nayan Hernandez 65 y.o. male checked and correct.  Chief Complaint   Patient presents with    Varicose Veins Rupture     Pt arrives via EMS for ruptured varicose vein.     Past Medical History:   Diagnosis Date    Aortic dissection     Juan Carlos Type A     Cardiac defibrillator in place     CHF (congestive heart failure) 12/15/14    EF 15-20%    Coronary artery disease     ETOH abuse     Hepatitis C antibody test positive     Hypertension     S/P aortic dissection repair 2011     Allergies reported: Review of patient's allergies indicates:  No Known Allergies      LOC: Patient is awake, alert, and aware of environment with an appropriate affect. Patient is oriented x 3 and speaking appropriately.  APPEARANCE: Patient resting comfortably and in no acute distress. Patient is clean and well groomed, patient's clothing is properly fastened.  HEENT: WDL  SKIN: The skin is warm and dry. Patient has normal skin turgor and moist mucus membranes. Pt has a tear in left foot from scratching varicose vein. Pt reports pain to the area.   MUSKULOSKELETAL: Patient is moving all extremities well, no obvious deformities noted. Pulses intact.   RESPIRATORY: Airway is open and patent. Respirations are spontaneous and non-labored with normal effort and rate  CARDIAC: Patient has a normal rate and rhythm. No peripheral edema noted.   ABDOMEN: No distention noted. Bowel sounds active in all 4 quadrants. Soft and non-tender upon palpation.  NEUROLOGICAL:  PERRL. Facial expression is symmetrical. Hand grasps are equal bilaterally. Normal sensation in all extremities when touched with finger.

## 2022-01-01 ENCOUNTER — HOSPITAL ENCOUNTER (EMERGENCY)
Facility: HOSPITAL | Age: 67
Discharge: HOME | End: 2022-05-25
Attending: EMERGENCY MEDICINE
Payer: COMMERCIAL

## 2022-01-01 ENCOUNTER — HOSPITAL ENCOUNTER (INPATIENT)
Facility: HOSPITAL | Age: 67
LOS: 10 days | DRG: 291 | End: 2022-10-31
Attending: EMERGENCY MEDICINE | Admitting: INTERNAL MEDICINE
Payer: COMMERCIAL

## 2022-01-01 VITALS
HEIGHT: 67 IN | BODY MASS INDEX: 24.4 KG/M2 | DIASTOLIC BLOOD PRESSURE: 35 MMHG | SYSTOLIC BLOOD PRESSURE: 59 MMHG | WEIGHT: 155.44 LBS | TEMPERATURE: 80 F

## 2022-01-01 VITALS
SYSTOLIC BLOOD PRESSURE: 129 MMHG | BODY MASS INDEX: 24.22 KG/M2 | WEIGHT: 154.31 LBS | RESPIRATION RATE: 16 BRPM | HEART RATE: 68 BPM | HEIGHT: 67 IN | OXYGEN SATURATION: 100 % | DIASTOLIC BLOOD PRESSURE: 75 MMHG | TEMPERATURE: 98 F

## 2022-01-01 DIAGNOSIS — R57.0 CARDIOGENIC SHOCK: ICD-10-CM

## 2022-01-01 DIAGNOSIS — R09.02 HYPOXIA: ICD-10-CM

## 2022-01-01 DIAGNOSIS — N18.6 ESRD (END STAGE RENAL DISEASE): Primary | ICD-10-CM

## 2022-01-01 DIAGNOSIS — R07.9 CHEST PAIN: ICD-10-CM

## 2022-01-01 DIAGNOSIS — Z01.812 PRE-PROCEDURE LAB EXAM: Primary | ICD-10-CM

## 2022-01-01 DIAGNOSIS — E87.5 HYPERKALEMIA: ICD-10-CM

## 2022-01-01 DIAGNOSIS — Z51.5 PALLIATIVE CARE ENCOUNTER: ICD-10-CM

## 2022-01-01 DIAGNOSIS — I95.9 HYPOTENSION, UNSPECIFIED HYPOTENSION TYPE: Primary | ICD-10-CM

## 2022-01-01 DIAGNOSIS — N18.6 ESRD (END STAGE RENAL DISEASE): ICD-10-CM

## 2022-01-01 DIAGNOSIS — Z71.89 ADVANCED CARE PLANNING/COUNSELING DISCUSSION: ICD-10-CM

## 2022-01-01 DIAGNOSIS — I71.21 ASCENDING AORTIC ANEURYSM, UNSPECIFIED WHETHER RUPTURED: ICD-10-CM

## 2022-01-01 DIAGNOSIS — Z99.2 ESRD (END STAGE RENAL DISEASE) ON DIALYSIS: Primary | ICD-10-CM

## 2022-01-01 DIAGNOSIS — R06.02 SOB (SHORTNESS OF BREATH): ICD-10-CM

## 2022-01-01 DIAGNOSIS — E87.70 VOLUME OVERLOAD: ICD-10-CM

## 2022-01-01 DIAGNOSIS — R06.02 SHORTNESS OF BREATH: ICD-10-CM

## 2022-01-01 DIAGNOSIS — I95.9 HYPOTENSION: ICD-10-CM

## 2022-01-01 DIAGNOSIS — Z71.89 GOALS OF CARE, COUNSELING/DISCUSSION: ICD-10-CM

## 2022-01-01 DIAGNOSIS — N18.6 ESRD (END STAGE RENAL DISEASE) ON DIALYSIS: Primary | ICD-10-CM

## 2022-01-01 LAB
ALBUMIN SERPL BCP-MCNC: 2.5 G/DL (ref 3.5–5.2)
ALBUMIN SERPL BCP-MCNC: 2.6 G/DL (ref 3.5–5.2)
ALBUMIN SERPL BCP-MCNC: 2.7 G/DL (ref 3.5–5.2)
ALBUMIN SERPL BCP-MCNC: 2.9 G/DL (ref 3.5–5.2)
ALBUMIN SERPL BCP-MCNC: 3 G/DL (ref 3.5–5.2)
ALBUMIN SERPL BCP-MCNC: 3.1 G/DL (ref 3.5–5.2)
ALBUMIN SERPL BCP-MCNC: 3.2 G/DL (ref 3.5–5.2)
ALLENS TEST: ABNORMAL
ALP SERPL-CCNC: 110 U/L (ref 55–135)
ALP SERPL-CCNC: 66 U/L (ref 55–135)
ALP SERPL-CCNC: 67 U/L (ref 55–135)
ALP SERPL-CCNC: 71 U/L (ref 55–135)
ALP SERPL-CCNC: 74 U/L (ref 55–135)
ALP SERPL-CCNC: 74 U/L (ref 55–135)
ALP SERPL-CCNC: 77 U/L (ref 55–135)
ALP SERPL-CCNC: 78 U/L (ref 55–135)
ALP SERPL-CCNC: 86 U/L (ref 55–135)
ALT SERPL W/O P-5'-P-CCNC: 16 U/L (ref 10–44)
ALT SERPL W/O P-5'-P-CCNC: 17 U/L (ref 10–44)
ALT SERPL W/O P-5'-P-CCNC: 55 U/L (ref 10–44)
ALT SERPL W/O P-5'-P-CCNC: 6 U/L (ref 10–44)
ALT SERPL W/O P-5'-P-CCNC: 7 U/L (ref 10–44)
ALT SERPL W/O P-5'-P-CCNC: 8 U/L (ref 10–44)
ALT SERPL W/O P-5'-P-CCNC: 8 U/L (ref 10–44)
ALT SERPL W/O P-5'-P-CCNC: 9 U/L (ref 10–44)
ALT SERPL W/O P-5'-P-CCNC: 9 U/L (ref 10–44)
ANION GAP SERPL CALC-SCNC: 11 MMOL/L (ref 8–16)
ANION GAP SERPL CALC-SCNC: 13 MMOL/L (ref 8–16)
ANION GAP SERPL CALC-SCNC: 14 MMOL/L (ref 8–16)
ANION GAP SERPL CALC-SCNC: 15 MMOL/L (ref 8–16)
ANION GAP SERPL CALC-SCNC: 15 MMOL/L (ref 8–16)
ANION GAP SERPL CALC-SCNC: 18 MMOL/L (ref 8–16)
ANION GAP SERPL CALC-SCNC: 18 MMOL/L (ref 8–16)
ANION GAP SERPL CALC-SCNC: 19 MMOL/L (ref 8–16)
ANION GAP SERPL CALC-SCNC: 19 MMOL/L (ref 8–16)
ANION GAP SERPL CALC-SCNC: 20 MMOL/L (ref 8–16)
ANION GAP SERPL CALC-SCNC: 6 MMOL/L (ref 8–16)
ANION GAP SERPL CALC-SCNC: 9 MMOL/L (ref 8–16)
ANISOCYTOSIS BLD QL SMEAR: ABNORMAL
APTT BLDCRRT: 24.5 SEC (ref 21–32)
ASCENDING AORTA: 2.68 CM
AST SERPL-CCNC: 154 U/L (ref 10–40)
AST SERPL-CCNC: 20 U/L (ref 10–40)
AST SERPL-CCNC: 22 U/L (ref 10–40)
AST SERPL-CCNC: 23 U/L (ref 10–40)
AST SERPL-CCNC: 25 U/L (ref 10–40)
AST SERPL-CCNC: 28 U/L (ref 10–40)
AST SERPL-CCNC: 50 U/L (ref 10–40)
AV INDEX (PROSTH): 0.62
AV MEAN GRADIENT: 2 MMHG
AV PEAK GRADIENT: 3 MMHG
AV VALVE AREA: 2.11 CM2
AV VELOCITY RATIO: 0.68
BACTERIA BLD CULT: NORMAL
BACTERIA BLD CULT: NORMAL
BASO STIPL BLD QL SMEAR: ABNORMAL
BASOPHILS # BLD AUTO: 0.01 K/UL (ref 0–0.2)
BASOPHILS # BLD AUTO: 0.02 K/UL (ref 0–0.2)
BASOPHILS # BLD AUTO: 0.03 K/UL (ref 0–0.2)
BASOPHILS NFR BLD: 0.2 % (ref 0–1.9)
BASOPHILS NFR BLD: 0.3 % (ref 0–1.9)
BASOPHILS NFR BLD: 0.5 % (ref 0–1.9)
BASOPHILS NFR BLD: 0.6 % (ref 0–1.9)
BASOPHILS NFR BLD: 0.8 % (ref 0–1.9)
BILIRUB SERPL-MCNC: 0.6 MG/DL (ref 0.1–1)
BILIRUB SERPL-MCNC: 1 MG/DL (ref 0.1–1)
BILIRUB SERPL-MCNC: 1.3 MG/DL (ref 0.1–1)
BILIRUB SERPL-MCNC: 1.4 MG/DL (ref 0.1–1)
BILIRUB SERPL-MCNC: 1.5 MG/DL (ref 0.1–1)
BILIRUB SERPL-MCNC: 1.6 MG/DL (ref 0.1–1)
BILIRUB SERPL-MCNC: 1.8 MG/DL (ref 0.1–1)
BILIRUB SERPL-MCNC: 1.9 MG/DL (ref 0.1–1)
BILIRUB SERPL-MCNC: 1.9 MG/DL (ref 0.1–1)
BNP SERPL-MCNC: 3351 PG/ML (ref 0–99)
BSA FOR ECHO PROCEDURE: 1.89 M2
BUN SERPL-MCNC: 10 MG/DL (ref 6–30)
BUN SERPL-MCNC: 10 MG/DL (ref 8–23)
BUN SERPL-MCNC: 10 MG/DL (ref 8–23)
BUN SERPL-MCNC: 12 MG/DL (ref 8–23)
BUN SERPL-MCNC: 12 MG/DL (ref 8–23)
BUN SERPL-MCNC: 3 MG/DL (ref 8–23)
BUN SERPL-MCNC: 4 MG/DL (ref 8–23)
BUN SERPL-MCNC: 59 MG/DL (ref 8–23)
BUN SERPL-MCNC: 7 MG/DL (ref 8–23)
BUN SERPL-MCNC: 8 MG/DL (ref 8–23)
BUN SERPL-MCNC: 8 MG/DL (ref 8–23)
BUN SERPL-MCNC: <3 MG/DL (ref 8–23)
BURR CELLS BLD QL SMEAR: ABNORMAL
CA-I BLDV-SCNC: 0.63 MMOL/L (ref 1.06–1.42)
CA-I BLDV-SCNC: 1.03 MMOL/L (ref 1.06–1.42)
CALCIUM SERPL-MCNC: 8 MG/DL (ref 8.7–10.5)
CALCIUM SERPL-MCNC: 8.1 MG/DL (ref 8.7–10.5)
CALCIUM SERPL-MCNC: 8.4 MG/DL (ref 8.7–10.5)
CALCIUM SERPL-MCNC: 8.5 MG/DL (ref 8.7–10.5)
CALCIUM SERPL-MCNC: 8.7 MG/DL (ref 8.7–10.5)
CALCIUM SERPL-MCNC: 8.9 MG/DL (ref 8.7–10.5)
CALCIUM SERPL-MCNC: 9 MG/DL (ref 8.7–10.5)
CALCIUM SERPL-MCNC: 9 MG/DL (ref 8.7–10.5)
CALCIUM SERPL-MCNC: 9.2 MG/DL (ref 8.7–10.5)
CALCIUM SERPL-MCNC: 9.5 MG/DL (ref 8.7–10.5)
CHLORIDE SERPL-SCNC: 100 MMOL/L (ref 95–110)
CHLORIDE SERPL-SCNC: 103 MMOL/L (ref 95–110)
CHLORIDE SERPL-SCNC: 104 MMOL/L (ref 95–110)
CHLORIDE SERPL-SCNC: 104 MMOL/L (ref 95–110)
CHLORIDE SERPL-SCNC: 107 MMOL/L (ref 95–110)
CHLORIDE SERPL-SCNC: 109 MMOL/L (ref 95–110)
CHLORIDE SERPL-SCNC: 93 MMOL/L (ref 95–110)
CHLORIDE SERPL-SCNC: 95 MMOL/L (ref 95–110)
CHLORIDE SERPL-SCNC: 96 MMOL/L (ref 95–110)
CHLORIDE SERPL-SCNC: 99 MMOL/L (ref 95–110)
CO2 SERPL-SCNC: 18 MMOL/L (ref 23–29)
CO2 SERPL-SCNC: 19 MMOL/L (ref 23–29)
CO2 SERPL-SCNC: 20 MMOL/L (ref 23–29)
CO2 SERPL-SCNC: 20 MMOL/L (ref 23–29)
CO2 SERPL-SCNC: 21 MMOL/L (ref 23–29)
CO2 SERPL-SCNC: 21 MMOL/L (ref 23–29)
CO2 SERPL-SCNC: 22 MMOL/L (ref 23–29)
CO2 SERPL-SCNC: 23 MMOL/L (ref 23–29)
CO2 SERPL-SCNC: 25 MMOL/L (ref 23–29)
CO2 SERPL-SCNC: 26 MMOL/L (ref 23–29)
CO2 SERPL-SCNC: 30 MMOL/L (ref 23–29)
CO2 SERPL-SCNC: 30 MMOL/L (ref 23–29)
CREAT SERPL-MCNC: 0.8 MG/DL (ref 0.5–1.4)
CREAT SERPL-MCNC: 0.9 MG/DL (ref 0.5–1.4)
CREAT SERPL-MCNC: 0.9 MG/DL (ref 0.5–1.4)
CREAT SERPL-MCNC: 1.1 MG/DL (ref 0.5–1.4)
CREAT SERPL-MCNC: 1.4 MG/DL (ref 0.5–1.4)
CREAT SERPL-MCNC: 2.1 MG/DL (ref 0.5–1.4)
CREAT SERPL-MCNC: 2.2 MG/DL (ref 0.5–1.4)
CREAT SERPL-MCNC: 2.2 MG/DL (ref 0.5–1.4)
CREAT SERPL-MCNC: 2.6 MG/DL (ref 0.5–1.4)
CREAT SERPL-MCNC: 2.8 MG/DL (ref 0.5–1.4)
CREAT SERPL-MCNC: 2.8 MG/DL (ref 0.5–1.4)
CREAT SERPL-MCNC: 3 MG/DL (ref 0.5–1.4)
CREAT SERPL-MCNC: 3.6 MG/DL (ref 0.5–1.4)
CREAT SERPL-MCNC: 3.7 MG/DL (ref 0.5–1.4)
CREAT SERPL-MCNC: 3.8 MG/DL (ref 0.5–1.4)
CREAT SERPL-MCNC: 5.4 MG/DL (ref 0.5–1.4)
CRP SERPL-MCNC: 14.7 MG/L (ref 0–8.2)
CV ECHO LV RWT: 0.23 CM
DACRYOCYTES BLD QL SMEAR: ABNORMAL
DELSYS: ABNORMAL
DIFFERENTIAL METHOD: ABNORMAL
DOP CALC AO PEAK VEL: 0.93 M/S
DOP CALC AO VTI: 15.53 CM
DOP CALC LVOT AREA: 3.4 CM2
DOP CALC LVOT DIAMETER: 2.08 CM
DOP CALC LVOT PEAK VEL: 0.63 M/S
DOP CALC LVOT STROKE VOLUME: 32.71 CM3
DOP CALCLVOT PEAK VEL VTI: 9.63 CM
E/E' RATIO: 21.45 M/S
ECHO LV POSTERIOR WALL: 0.71 CM (ref 0.6–1.1)
EJECTION FRACTION: 20 %
EOSINOPHIL # BLD AUTO: 0 K/UL (ref 0–0.5)
EOSINOPHIL # BLD AUTO: 0.1 K/UL (ref 0–0.5)
EOSINOPHIL # BLD AUTO: 0.5 K/UL (ref 0–0.5)
EOSINOPHIL NFR BLD: 0.9 % (ref 0–8)
EOSINOPHIL NFR BLD: 1 % (ref 0–8)
EOSINOPHIL NFR BLD: 1.3 % (ref 0–8)
EOSINOPHIL NFR BLD: 1.7 % (ref 0–8)
EOSINOPHIL NFR BLD: 1.7 % (ref 0–8)
EOSINOPHIL NFR BLD: 1.8 % (ref 0–8)
EOSINOPHIL NFR BLD: 12.5 % (ref 0–8)
EOSINOPHIL NFR BLD: 2.1 % (ref 0–8)
EOSINOPHIL NFR BLD: 2.2 % (ref 0–8)
ERYTHROCYTE [DISTWIDTH] IN BLOOD BY AUTOMATED COUNT: 20.9 % (ref 11.5–14.5)
ERYTHROCYTE [DISTWIDTH] IN BLOOD BY AUTOMATED COUNT: 21.2 % (ref 11.5–14.5)
ERYTHROCYTE [DISTWIDTH] IN BLOOD BY AUTOMATED COUNT: 21.4 % (ref 11.5–14.5)
ERYTHROCYTE [DISTWIDTH] IN BLOOD BY AUTOMATED COUNT: 21.5 % (ref 11.5–14.5)
ERYTHROCYTE [DISTWIDTH] IN BLOOD BY AUTOMATED COUNT: 21.8 % (ref 11.5–14.5)
ERYTHROCYTE [DISTWIDTH] IN BLOOD BY AUTOMATED COUNT: 22.2 % (ref 11.5–14.5)
ERYTHROCYTE [DISTWIDTH] IN BLOOD BY AUTOMATED COUNT: 22.5 % (ref 11.5–14.5)
ERYTHROCYTE [DISTWIDTH] IN BLOOD BY AUTOMATED COUNT: 22.7 % (ref 11.5–14.5)
ERYTHROCYTE [DISTWIDTH] IN BLOOD BY AUTOMATED COUNT: 22.7 % (ref 11.5–14.5)
EST. GFR  (AFRICAN AMERICAN): 11.6 ML/MIN/1.73 M^2
EST. GFR  (NO RACE VARIABLE): 16.9 ML/MIN/1.73 M^2
EST. GFR  (NO RACE VARIABLE): 17.5 ML/MIN/1.73 M^2
EST. GFR  (NO RACE VARIABLE): 21.8 ML/MIN/1.73 M^2
EST. GFR  (NO RACE VARIABLE): 23.7 ML/MIN/1.73 M^2
EST. GFR  (NO RACE VARIABLE): 23.7 ML/MIN/1.73 M^2
EST. GFR  (NO RACE VARIABLE): 25.9 ML/MIN/1.73 M^2
EST. GFR  (NO RACE VARIABLE): 31.6 ML/MIN/1.73 M^2
EST. GFR  (NO RACE VARIABLE): 31.6 ML/MIN/1.73 M^2
EST. GFR  (NO RACE VARIABLE): 33.4 ML/MIN/1.73 M^2
EST. GFR  (NO RACE VARIABLE): 54.4 ML/MIN/1.73 M^2
EST. GFR  (NO RACE VARIABLE): >60 ML/MIN/1.73 M^2
EST. GFR  (NON AFRICAN AMERICAN): 10 ML/MIN/1.73 M^2
FLOW: 2
FRACTIONAL SHORTENING: 7 % (ref 28–44)
GLUCOSE SERPL-MCNC: 119 MG/DL (ref 70–110)
GLUCOSE SERPL-MCNC: 126 MG/DL (ref 70–110)
GLUCOSE SERPL-MCNC: 129 MG/DL (ref 70–110)
GLUCOSE SERPL-MCNC: 181 MG/DL (ref 70–110)
GLUCOSE SERPL-MCNC: 181 MG/DL (ref 70–110)
GLUCOSE SERPL-MCNC: 67 MG/DL (ref 70–110)
GLUCOSE SERPL-MCNC: 81 MG/DL (ref 70–110)
GLUCOSE SERPL-MCNC: 84 MG/DL (ref 70–110)
GLUCOSE SERPL-MCNC: 86 MG/DL (ref 70–110)
GLUCOSE SERPL-MCNC: 86 MG/DL (ref 70–110)
GLUCOSE SERPL-MCNC: 87 MG/DL (ref 70–110)
GLUCOSE SERPL-MCNC: 88 MG/DL (ref 70–110)
GLUCOSE SERPL-MCNC: 88 MG/DL (ref 70–110)
GLUCOSE SERPL-MCNC: 90 MG/DL (ref 70–110)
GLUCOSE SERPL-MCNC: 94 MG/DL (ref 70–110)
GLUCOSE SERPL-MCNC: 96 MG/DL (ref 70–110)
HAV IGM SERPL QL IA: ABNORMAL
HBV CORE IGM SERPL QL IA: ABNORMAL
HBV SURFACE AG SERPL QL IA: ABNORMAL
HCO3 UR-SCNC: 19.7 MMOL/L (ref 24–28)
HCO3 UR-SCNC: 24.1 MMOL/L (ref 24–28)
HCO3 UR-SCNC: 24.6 MMOL/L (ref 24–28)
HCO3 UR-SCNC: 25.4 MMOL/L (ref 24–28)
HCO3 UR-SCNC: 25.7 MMOL/L (ref 24–28)
HCO3 UR-SCNC: 26 MMOL/L (ref 24–28)
HCO3 UR-SCNC: 26 MMOL/L (ref 24–28)
HCO3 UR-SCNC: 27.1 MMOL/L (ref 24–28)
HCO3 UR-SCNC: 27.4 MMOL/L (ref 24–28)
HCT VFR BLD AUTO: 23.3 % (ref 40–54)
HCT VFR BLD AUTO: 23.9 % (ref 40–54)
HCT VFR BLD AUTO: 24.1 % (ref 40–54)
HCT VFR BLD AUTO: 24.2 % (ref 40–54)
HCT VFR BLD AUTO: 25.5 % (ref 40–54)
HCT VFR BLD AUTO: 25.6 % (ref 40–54)
HCT VFR BLD AUTO: 27.3 % (ref 40–54)
HCT VFR BLD AUTO: 27.5 % (ref 40–54)
HCT VFR BLD AUTO: 30.9 % (ref 40–54)
HCT VFR BLD CALC: 33 %PCV (ref 36–54)
HCV AB SERPL QL IA: REACTIVE
HCV RNA SERPL NAA+PROBE-LOG IU: 4.67 LOGIU/ML
HCV RNA SERPL QL NAA+PROBE: DETECTED
HCV RNA SPEC NAA+PROBE-ACNC: ABNORMAL IU/ML
HGB BLD-MCNC: 7.4 G/DL (ref 14–18)
HGB BLD-MCNC: 7.6 G/DL (ref 14–18)
HGB BLD-MCNC: 7.6 G/DL (ref 14–18)
HGB BLD-MCNC: 7.7 G/DL (ref 14–18)
HGB BLD-MCNC: 8 G/DL (ref 14–18)
HGB BLD-MCNC: 8.2 G/DL (ref 14–18)
HGB BLD-MCNC: 8.5 G/DL (ref 14–18)
HGB BLD-MCNC: 8.6 G/DL (ref 14–18)
HGB BLD-MCNC: 9.2 G/DL (ref 14–18)
HIV 1+2 AB+HIV1 P24 AG SERPL QL IA: NEGATIVE
HYPOCHROMIA BLD QL SMEAR: ABNORMAL
IMM GRANULOCYTES # BLD AUTO: 0 K/UL (ref 0–0.04)
IMM GRANULOCYTES # BLD AUTO: 0.01 K/UL (ref 0–0.04)
IMM GRANULOCYTES NFR BLD AUTO: 0 % (ref 0–0.5)
IMM GRANULOCYTES NFR BLD AUTO: 0.2 % (ref 0–0.5)
IMM GRANULOCYTES NFR BLD AUTO: 0.2 % (ref 0–0.5)
IMM GRANULOCYTES NFR BLD AUTO: 0.3 % (ref 0–0.5)
INR PPP: 1.9 (ref 0.8–1.2)
INTERVENTRICULAR SEPTUM: 0.73 CM (ref 0.6–1.1)
IRON SERPL-MCNC: 59 UG/DL (ref 45–160)
IRON SERPL-MCNC: 59 UG/DL (ref 45–160)
LA MAJOR: 6.78 CM
LA MINOR: 6.75 CM
LA WIDTH: 4.4 CM
LACTATE SERPL-SCNC: 2.2 MMOL/L (ref 0.5–2.2)
LACTATE SERPL-SCNC: 2.8 MMOL/L (ref 0.5–2.2)
LACTATE SERPL-SCNC: 2.8 MMOL/L (ref 0.5–2.2)
LACTATE SERPL-SCNC: 3.3 MMOL/L (ref 0.5–2.2)
LACTATE SERPL-SCNC: 4.5 MMOL/L (ref 0.5–2.2)
LACTATE SERPL-SCNC: 5 MMOL/L (ref 0.5–2.2)
LDH SERPL L TO P-CCNC: 3.21 MMOL/L (ref 0.5–2.2)
LDH SERPL L TO P-CCNC: 5.2 MMOL/L (ref 0.5–2.2)
LEFT ATRIUM SIZE: 4.85 CM
LEFT ATRIUM VOLUME INDEX MOD: 50.5 ML/M2
LEFT ATRIUM VOLUME INDEX: 65.6 ML/M2
LEFT ATRIUM VOLUME MOD: 94.4 CM3
LEFT ATRIUM VOLUME: 122.71 CM3
LEFT INTERNAL DIMENSION IN SYSTOLE: 5.75 CM (ref 2.1–4)
LEFT VENTRICLE DIASTOLIC VOLUME INDEX: 103.15 ML/M2
LEFT VENTRICLE DIASTOLIC VOLUME: 192.89 ML
LEFT VENTRICLE MASS INDEX: 92 G/M2
LEFT VENTRICLE SYSTOLIC VOLUME INDEX: 87.2 ML/M2
LEFT VENTRICLE SYSTOLIC VOLUME: 163.13 ML
LEFT VENTRICULAR INTERNAL DIMENSION IN DIASTOLE: 6.18 CM (ref 3.5–6)
LEFT VENTRICULAR MASS: 172.34 G
LV LATERAL E/E' RATIO: 16.86 M/S
LV SEPTAL E/E' RATIO: 29.5 M/S
LYMPHOCYTES # BLD AUTO: 0.5 K/UL (ref 1–4.8)
LYMPHOCYTES # BLD AUTO: 0.5 K/UL (ref 1–4.8)
LYMPHOCYTES # BLD AUTO: 0.6 K/UL (ref 1–4.8)
LYMPHOCYTES # BLD AUTO: 0.6 K/UL (ref 1–4.8)
LYMPHOCYTES # BLD AUTO: 0.7 K/UL (ref 1–4.8)
LYMPHOCYTES # BLD AUTO: 0.8 K/UL (ref 1–4.8)
LYMPHOCYTES # BLD AUTO: 1 K/UL (ref 1–4.8)
LYMPHOCYTES NFR BLD: 14.5 % (ref 18–48)
LYMPHOCYTES NFR BLD: 15.5 % (ref 18–48)
LYMPHOCYTES NFR BLD: 16.8 % (ref 18–48)
LYMPHOCYTES NFR BLD: 17.7 % (ref 18–48)
LYMPHOCYTES NFR BLD: 18.4 % (ref 18–48)
LYMPHOCYTES NFR BLD: 19.9 % (ref 18–48)
LYMPHOCYTES NFR BLD: 26.2 % (ref 18–48)
LYMPHOCYTES NFR BLD: 28.3 % (ref 18–48)
LYMPHOCYTES NFR BLD: 9.3 % (ref 18–48)
MAGNESIUM SERPL-MCNC: 1.8 MG/DL (ref 1.6–2.6)
MAGNESIUM SERPL-MCNC: 1.9 MG/DL (ref 1.6–2.6)
MAGNESIUM SERPL-MCNC: 2 MG/DL (ref 1.6–2.6)
MAGNESIUM SERPL-MCNC: 2 MG/DL (ref 1.6–2.6)
MAGNESIUM SERPL-MCNC: 2.1 MG/DL (ref 1.6–2.6)
MAGNESIUM SERPL-MCNC: 2.1 MG/DL (ref 1.6–2.6)
MAGNESIUM SERPL-MCNC: 2.5 MG/DL (ref 1.6–2.6)
MAGNESIUM SERPL-MCNC: 2.5 MG/DL (ref 1.6–2.6)
MCH RBC QN AUTO: 27.1 PG (ref 27–31)
MCH RBC QN AUTO: 27.7 PG (ref 27–31)
MCH RBC QN AUTO: 28.2 PG (ref 27–31)
MCH RBC QN AUTO: 28.3 PG (ref 27–31)
MCH RBC QN AUTO: 28.5 PG (ref 27–31)
MCH RBC QN AUTO: 28.7 PG (ref 27–31)
MCH RBC QN AUTO: 29.3 PG (ref 27–31)
MCHC RBC AUTO-ENTMCNC: 29.8 G/DL (ref 32–36)
MCHC RBC AUTO-ENTMCNC: 30.9 G/DL (ref 32–36)
MCHC RBC AUTO-ENTMCNC: 31.3 G/DL (ref 32–36)
MCHC RBC AUTO-ENTMCNC: 31.5 G/DL (ref 32–36)
MCHC RBC AUTO-ENTMCNC: 31.5 G/DL (ref 32–36)
MCHC RBC AUTO-ENTMCNC: 31.8 G/DL (ref 32–36)
MCHC RBC AUTO-ENTMCNC: 32.2 G/DL (ref 32–36)
MCV RBC AUTO: 85 FL (ref 82–98)
MCV RBC AUTO: 89 FL (ref 82–98)
MCV RBC AUTO: 90 FL (ref 82–98)
MCV RBC AUTO: 91 FL (ref 82–98)
MCV RBC AUTO: 91 FL (ref 82–98)
MCV RBC AUTO: 93 FL (ref 82–98)
MCV RBC AUTO: 93 FL (ref 82–98)
MODE: ABNORMAL
MONOCYTES # BLD AUTO: 0.4 K/UL (ref 0.3–1)
MONOCYTES # BLD AUTO: 0.5 K/UL (ref 0.3–1)
MONOCYTES # BLD AUTO: 0.7 K/UL (ref 0.3–1)
MONOCYTES # BLD AUTO: 0.8 K/UL (ref 0.3–1)
MONOCYTES NFR BLD: 11.7 % (ref 4–15)
MONOCYTES NFR BLD: 11.9 % (ref 4–15)
MONOCYTES NFR BLD: 12.7 % (ref 4–15)
MONOCYTES NFR BLD: 13 % (ref 4–15)
MONOCYTES NFR BLD: 13.1 % (ref 4–15)
MONOCYTES NFR BLD: 13.6 % (ref 4–15)
MONOCYTES NFR BLD: 13.8 % (ref 4–15)
MONOCYTES NFR BLD: 14.2 % (ref 4–15)
MONOCYTES NFR BLD: 16.4 % (ref 4–15)
MV PEAK E VEL: 1.18 M/S
NEUTROPHILS # BLD AUTO: 1.7 K/UL (ref 1.8–7.7)
NEUTROPHILS # BLD AUTO: 1.8 K/UL (ref 1.8–7.7)
NEUTROPHILS # BLD AUTO: 2.2 K/UL (ref 1.8–7.7)
NEUTROPHILS # BLD AUTO: 2.3 K/UL (ref 1.8–7.7)
NEUTROPHILS # BLD AUTO: 2.3 K/UL (ref 1.8–7.7)
NEUTROPHILS # BLD AUTO: 2.4 K/UL (ref 1.8–7.7)
NEUTROPHILS # BLD AUTO: 2.4 K/UL (ref 1.8–7.7)
NEUTROPHILS # BLD AUTO: 3.1 K/UL (ref 1.8–7.7)
NEUTROPHILS # BLD AUTO: 4.4 K/UL (ref 1.8–7.7)
NEUTROPHILS NFR BLD: 46.4 % (ref 38–73)
NEUTROPHILS NFR BLD: 58.9 % (ref 38–73)
NEUTROPHILS NFR BLD: 64.6 % (ref 38–73)
NEUTROPHILS NFR BLD: 64.8 % (ref 38–73)
NEUTROPHILS NFR BLD: 66.7 % (ref 38–73)
NEUTROPHILS NFR BLD: 66.7 % (ref 38–73)
NEUTROPHILS NFR BLD: 66.8 % (ref 38–73)
NEUTROPHILS NFR BLD: 71.7 % (ref 38–73)
NEUTROPHILS NFR BLD: 75.4 % (ref 38–73)
NRBC BLD-RTO: 0 /100 WBC
NRBC BLD-RTO: 1 /100 WBC
NRBC BLD-RTO: 2 /100 WBC
OVALOCYTES BLD QL SMEAR: ABNORMAL
PCO2 BLDA: 27.8 MMHG (ref 35–45)
PCO2 BLDA: 38.3 MMHG (ref 35–45)
PCO2 BLDA: 38.9 MMHG (ref 35–45)
PCO2 BLDA: 40.1 MMHG (ref 35–45)
PCO2 BLDA: 40.9 MMHG (ref 35–45)
PCO2 BLDA: 41.2 MMHG (ref 35–45)
PCO2 BLDA: 42.1 MMHG (ref 35–45)
PCO2 BLDA: 43.4 MMHG (ref 35–45)
PCO2 BLDA: 44.3 MMHG (ref 35–45)
PH SMN: 7.37 [PH] (ref 7.35–7.45)
PH SMN: 7.38 [PH] (ref 7.35–7.45)
PH SMN: 7.4 [PH] (ref 7.35–7.45)
PH SMN: 7.41 [PH] (ref 7.35–7.45)
PH SMN: 7.41 [PH] (ref 7.35–7.45)
PH SMN: 7.42 [PH] (ref 7.35–7.45)
PH SMN: 7.42 [PH] (ref 7.35–7.45)
PH SMN: 7.43 [PH] (ref 7.35–7.45)
PH SMN: 7.46 [PH] (ref 7.35–7.45)
PHOSPHATE SERPL-MCNC: 1.1 MG/DL (ref 2.7–4.5)
PHOSPHATE SERPL-MCNC: 2.2 MG/DL (ref 2.7–4.5)
PHOSPHATE SERPL-MCNC: 2.2 MG/DL (ref 2.7–4.5)
PHOSPHATE SERPL-MCNC: 2.5 MG/DL (ref 2.7–4.5)
PHOSPHATE SERPL-MCNC: 3.5 MG/DL (ref 2.7–4.5)
PHOSPHATE SERPL-MCNC: 3.5 MG/DL (ref 2.7–4.5)
PHOSPHATE SERPL-MCNC: 5.5 MG/DL (ref 2.7–4.5)
PHOSPHATE SERPL-MCNC: 6 MG/DL (ref 2.7–4.5)
PHOSPHATE SERPL-MCNC: 6 MG/DL (ref 2.7–4.5)
PHOSPHATE SERPL-MCNC: 6.2 MG/DL (ref 2.7–4.5)
PHOSPHATE SERPL-MCNC: <1 MG/DL (ref 2.7–4.5)
PISA TR MAX VEL: 2.69 M/S
PLATELET # BLD AUTO: 137 K/UL (ref 150–450)
PLATELET # BLD AUTO: 139 K/UL (ref 150–450)
PLATELET # BLD AUTO: 154 K/UL (ref 150–450)
PLATELET # BLD AUTO: 177 K/UL (ref 150–450)
PLATELET # BLD AUTO: 71 K/UL (ref 150–450)
PLATELET # BLD AUTO: 86 K/UL (ref 150–450)
PLATELET # BLD AUTO: 89 K/UL (ref 150–450)
PLATELET # BLD AUTO: 89 K/UL (ref 150–450)
PLATELET # BLD AUTO: 95 K/UL (ref 150–450)
PLATELET BLD QL SMEAR: ABNORMAL
PMV BLD AUTO: 11.3 FL (ref 9.2–12.9)
PMV BLD AUTO: 12.2 FL (ref 9.2–12.9)
PMV BLD AUTO: 12.4 FL (ref 9.2–12.9)
PMV BLD AUTO: 12.5 FL (ref 9.2–12.9)
PMV BLD AUTO: 12.5 FL (ref 9.2–12.9)
PMV BLD AUTO: 12.6 FL (ref 9.2–12.9)
PMV BLD AUTO: 12.6 FL (ref 9.2–12.9)
PMV BLD AUTO: 13 FL (ref 9.2–12.9)
PMV BLD AUTO: 14 FL (ref 9.2–12.9)
PO2 BLDA: 110 MMHG (ref 80–100)
PO2 BLDA: 23 MMHG (ref 40–60)
PO2 BLDA: 24 MMHG (ref 40–60)
PO2 BLDA: 25 MMHG (ref 40–60)
PO2 BLDA: 26 MMHG (ref 40–60)
PO2 BLDA: 27 MMHG (ref 40–60)
PO2 BLDA: 31 MMHG (ref 40–60)
PO2 BLDA: 33 MMHG (ref 40–60)
PO2 BLDA: 42 MMHG (ref 40–60)
POC BE: -1 MMOL/L
POC BE: -4 MMOL/L
POC BE: 0 MMOL/L
POC BE: 0 MMOL/L
POC BE: 1 MMOL/L
POC BE: 1 MMOL/L
POC BE: 2 MMOL/L
POC BE: 3 MMOL/L
POC BE: 3 MMOL/L
POC IONIZED CALCIUM: 0.94 MMOL/L (ref 1.06–1.42)
POC SATURATED O2: 40 % (ref 95–100)
POC SATURATED O2: 43 % (ref 95–100)
POC SATURATED O2: 47 % (ref 95–100)
POC SATURATED O2: 49 % (ref 95–100)
POC SATURATED O2: 51 % (ref 95–100)
POC SATURATED O2: 57 % (ref 95–100)
POC SATURATED O2: 65 % (ref 95–100)
POC SATURATED O2: 77 % (ref 95–100)
POC SATURATED O2: 99 % (ref 95–100)
POC TCO2 (MEASURED): 33 MMOL/L (ref 23–29)
POC TCO2: 21 MMOL/L (ref 23–27)
POC TCO2: 25 MMOL/L (ref 24–29)
POC TCO2: 26 MMOL/L (ref 24–29)
POC TCO2: 27 MMOL/L (ref 24–29)
POC TCO2: 28 MMOL/L (ref 24–29)
POC TCO2: 29 MMOL/L (ref 24–29)
POCT GLUCOSE: 102 MG/DL (ref 70–110)
POCT GLUCOSE: 103 MG/DL (ref 70–110)
POCT GLUCOSE: 105 MG/DL (ref 70–110)
POCT GLUCOSE: 105 MG/DL (ref 70–110)
POCT GLUCOSE: 112 MG/DL (ref 70–110)
POCT GLUCOSE: 112 MG/DL (ref 70–110)
POCT GLUCOSE: 115 MG/DL (ref 70–110)
POCT GLUCOSE: 118 MG/DL (ref 70–110)
POCT GLUCOSE: 119 MG/DL (ref 70–110)
POCT GLUCOSE: 120 MG/DL (ref 70–110)
POCT GLUCOSE: 124 MG/DL (ref 70–110)
POCT GLUCOSE: 127 MG/DL (ref 70–110)
POCT GLUCOSE: 132 MG/DL (ref 70–110)
POCT GLUCOSE: 147 MG/DL (ref 70–110)
POCT GLUCOSE: 168 MG/DL (ref 70–110)
POCT GLUCOSE: 63 MG/DL (ref 70–110)
POCT GLUCOSE: 72 MG/DL (ref 70–110)
POCT GLUCOSE: 73 MG/DL (ref 70–110)
POCT GLUCOSE: 77 MG/DL (ref 70–110)
POCT GLUCOSE: 77 MG/DL (ref 70–110)
POCT GLUCOSE: 78 MG/DL (ref 70–110)
POCT GLUCOSE: 79 MG/DL (ref 70–110)
POCT GLUCOSE: 81 MG/DL (ref 70–110)
POCT GLUCOSE: 82 MG/DL (ref 70–110)
POCT GLUCOSE: 96 MG/DL (ref 70–110)
POIKILOCYTOSIS BLD QL SMEAR: SLIGHT
POLYCHROMASIA BLD QL SMEAR: ABNORMAL
POTASSIUM BLD-SCNC: 3 MMOL/L (ref 3.5–5.1)
POTASSIUM SERPL-SCNC: 2.9 MMOL/L (ref 3.5–5.1)
POTASSIUM SERPL-SCNC: 3.2 MMOL/L (ref 3.5–5.1)
POTASSIUM SERPL-SCNC: 4 MMOL/L (ref 3.5–5.1)
POTASSIUM SERPL-SCNC: 4.1 MMOL/L (ref 3.5–5.1)
POTASSIUM SERPL-SCNC: 4.2 MMOL/L (ref 3.5–5.1)
POTASSIUM SERPL-SCNC: 4.4 MMOL/L (ref 3.5–5.1)
POTASSIUM SERPL-SCNC: 4.4 MMOL/L (ref 3.5–5.1)
PROCALCITONIN SERPL IA-MCNC: 0.36 NG/ML
PROT SERPL-MCNC: 6.7 G/DL (ref 6–8.4)
PROT SERPL-MCNC: 6.8 G/DL (ref 6–8.4)
PROT SERPL-MCNC: 7.1 G/DL (ref 6–8.4)
PROT SERPL-MCNC: 7.3 G/DL (ref 6–8.4)
PROT SERPL-MCNC: 7.5 G/DL (ref 6–8.4)
PROT SERPL-MCNC: 8.2 G/DL (ref 6–8.4)
PROT SERPL-MCNC: 8.6 G/DL (ref 6–8.4)
PROTHROMBIN TIME: 19 SEC (ref 9–12.5)
QEF: 18 %
RA MAJOR: 7.29 CM
RA PRESSURE: 15 MMHG
RA WIDTH: 6.72 CM
RBC # BLD AUTO: 2.58 M/UL (ref 4.6–6.2)
RBC # BLD AUTO: 2.69 M/UL (ref 4.6–6.2)
RBC # BLD AUTO: 2.7 M/UL (ref 4.6–6.2)
RBC # BLD AUTO: 2.8 M/UL (ref 4.6–6.2)
RBC # BLD AUTO: 2.81 M/UL (ref 4.6–6.2)
RBC # BLD AUTO: 2.88 M/UL (ref 4.6–6.2)
RBC # BLD AUTO: 2.94 M/UL (ref 4.6–6.2)
RBC # BLD AUTO: 3.01 M/UL (ref 4.6–6.2)
RBC # BLD AUTO: 3.32 M/UL (ref 4.6–6.2)
RIGHT VENTRICULAR END-DIASTOLIC DIMENSION: 6.75 CM
RV TISSUE DOPPLER FREE WALL SYSTOLIC VELOCITY 1 (APICAL 4 CHAMBER VIEW): 5.76 CM/S
SAMPLE: ABNORMAL
SARS-COV-2 RDRP RESP QL NAA+PROBE: NEGATIVE
SATURATED IRON: 16 % (ref 20–50)
SATURATED IRON: 16 % (ref 20–50)
SCHISTOCYTES BLD QL SMEAR: ABNORMAL
SCHISTOCYTES BLD QL SMEAR: PRESENT
SINUS: 3.17 CM
SITE: ABNORMAL
SODIUM BLD-SCNC: 140 MMOL/L (ref 136–145)
SODIUM SERPL-SCNC: 135 MMOL/L (ref 136–145)
SODIUM SERPL-SCNC: 136 MMOL/L (ref 136–145)
SODIUM SERPL-SCNC: 137 MMOL/L (ref 136–145)
SODIUM SERPL-SCNC: 139 MMOL/L (ref 136–145)
SODIUM SERPL-SCNC: 141 MMOL/L (ref 136–145)
SODIUM SERPL-SCNC: 142 MMOL/L (ref 136–145)
SP02: 99
STJ: 2.92 CM
TARGETS BLD QL SMEAR: ABNORMAL
TDI LATERAL: 0.07 M/S
TDI SEPTAL: 0.04 M/S
TDI: 0.06 M/S
TOTAL IRON BINDING CAPACITY: 376 UG/DL (ref 250–450)
TOTAL IRON BINDING CAPACITY: 376 UG/DL (ref 250–450)
TR MAX PG: 29 MMHG
TRANSFERRIN SERPL-MCNC: 254 MG/DL (ref 200–375)
TRANSFERRIN SERPL-MCNC: 254 MG/DL (ref 200–375)
TRICUSPID ANNULAR PLANE SYSTOLIC EXCURSION: 1.51 CM
TROPONIN I SERPL DL<=0.01 NG/ML-MCNC: 0.07 NG/ML (ref 0–0.03)
TV REST PULMONARY ARTERY PRESSURE: 44 MMHG
VANCOMYCIN SERPL-MCNC: 12.2 UG/ML
VANCOMYCIN SERPL-MCNC: 13.1 UG/ML
VANCOMYCIN SERPL-MCNC: 28.3 UG/ML
VANCOMYCIN SERPL-MCNC: 29 UG/ML
VANCOMYCIN SERPL-MCNC: 32.1 UG/ML
VANCOMYCIN SERPL-MCNC: 33.1 UG/ML
WBC # BLD AUTO: 3.02 K/UL (ref 3.9–12.7)
WBC # BLD AUTO: 3.18 K/UL (ref 3.9–12.7)
WBC # BLD AUTO: 3.37 K/UL (ref 3.9–12.7)
WBC # BLD AUTO: 3.46 K/UL (ref 3.9–12.7)
WBC # BLD AUTO: 3.6 K/UL (ref 3.9–12.7)
WBC # BLD AUTO: 3.61 K/UL (ref 3.9–12.7)
WBC # BLD AUTO: 3.7 K/UL (ref 3.9–12.7)
WBC # BLD AUTO: 4.58 K/UL (ref 3.9–12.7)
WBC # BLD AUTO: 5.82 K/UL (ref 3.9–12.7)

## 2022-01-01 PROCEDURE — 63600175 PHARM REV CODE 636 W HCPCS: Performed by: INTERNAL MEDICINE

## 2022-01-01 PROCEDURE — 99285 PR EMERGENCY DEPT VISIT,LEVEL V: ICD-10-PCS | Mod: ,,, | Performed by: EMERGENCY MEDICINE

## 2022-01-01 PROCEDURE — 83605 ASSAY OF LACTIC ACID: CPT | Performed by: PHYSICIAN ASSISTANT

## 2022-01-01 PROCEDURE — 84484 ASSAY OF TROPONIN QUANT: CPT | Performed by: STUDENT IN AN ORGANIZED HEALTH CARE EDUCATION/TRAINING PROGRAM

## 2022-01-01 PROCEDURE — 83605 ASSAY OF LACTIC ACID: CPT | Mod: 91

## 2022-01-01 PROCEDURE — 99231 PR SUBSEQUENT HOSPITAL CARE,LEVL I: ICD-10-PCS | Mod: GC,,, | Performed by: INTERNAL MEDICINE

## 2022-01-01 PROCEDURE — 90945 DIALYSIS ONE EVALUATION: CPT

## 2022-01-01 PROCEDURE — 27000221 HC OXYGEN, UP TO 24 HOURS

## 2022-01-01 PROCEDURE — 63600175 PHARM REV CODE 636 W HCPCS: Performed by: STUDENT IN AN ORGANIZED HEALTH CARE EDUCATION/TRAINING PROGRAM

## 2022-01-01 PROCEDURE — 63600175 PHARM REV CODE 636 W HCPCS

## 2022-01-01 PROCEDURE — 25000003 PHARM REV CODE 250: Performed by: STUDENT IN AN ORGANIZED HEALTH CARE EDUCATION/TRAINING PROGRAM

## 2022-01-01 PROCEDURE — 25000003 PHARM REV CODE 250

## 2022-01-01 PROCEDURE — 80069 RENAL FUNCTION PANEL: CPT | Performed by: STUDENT IN AN ORGANIZED HEALTH CARE EDUCATION/TRAINING PROGRAM

## 2022-01-01 PROCEDURE — 84100 ASSAY OF PHOSPHORUS: CPT

## 2022-01-01 PROCEDURE — 36556 INSERT NON-TUNNEL CV CATH: CPT

## 2022-01-01 PROCEDURE — 99291 PR CRITICAL CARE, E/M 30-74 MINUTES: ICD-10-PCS | Mod: GC,,, | Performed by: INTERNAL MEDICINE

## 2022-01-01 PROCEDURE — 99285 EMERGENCY DEPT VISIT HI MDM: CPT | Mod: ,,, | Performed by: EMERGENCY MEDICINE

## 2022-01-01 PROCEDURE — 82803 BLOOD GASES ANY COMBINATION: CPT

## 2022-01-01 PROCEDURE — 99291 CRITICAL CARE FIRST HOUR: CPT | Mod: GC,,, | Performed by: INTERNAL MEDICINE

## 2022-01-01 PROCEDURE — 99223 1ST HOSP IP/OBS HIGH 75: CPT | Mod: GC,,, | Performed by: INTERNAL MEDICINE

## 2022-01-01 PROCEDURE — 80069 RENAL FUNCTION PANEL: CPT | Performed by: INTERNAL MEDICINE

## 2022-01-01 PROCEDURE — 25000003 PHARM REV CODE 250: Performed by: NURSE PRACTITIONER

## 2022-01-01 PROCEDURE — 83605 ASSAY OF LACTIC ACID: CPT | Performed by: INTERNAL MEDICINE

## 2022-01-01 PROCEDURE — 99233 SBSQ HOSP IP/OBS HIGH 50: CPT | Mod: GT,,, | Performed by: CLINICAL NURSE SPECIALIST

## 2022-01-01 PROCEDURE — 99900035 HC TECH TIME PER 15 MIN (STAT)

## 2022-01-01 PROCEDURE — 80047 BASIC METABLC PNL IONIZED CA: CPT

## 2022-01-01 PROCEDURE — 93005 ELECTROCARDIOGRAM TRACING: CPT

## 2022-01-01 PROCEDURE — 90935 HEMODIALYSIS ONE EVALUATION: CPT | Mod: ,,, | Performed by: NURSE PRACTITIONER

## 2022-01-01 PROCEDURE — 99497 ADVNCD CARE PLAN 30 MIN: CPT | Mod: ,,, | Performed by: CLINICAL NURSE SPECIALIST

## 2022-01-01 PROCEDURE — 87040 BLOOD CULTURE FOR BACTERIA: CPT | Performed by: STUDENT IN AN ORGANIZED HEALTH CARE EDUCATION/TRAINING PROGRAM

## 2022-01-01 PROCEDURE — 93010 EKG 12-LEAD: ICD-10-PCS | Mod: ,,, | Performed by: INTERNAL MEDICINE

## 2022-01-01 PROCEDURE — 99223 PR INITIAL HOSPITAL CARE,LEVL III: ICD-10-PCS | Mod: ,,, | Performed by: INTERNAL MEDICINE

## 2022-01-01 PROCEDURE — 99499 ARTERIAL LINE: ICD-10-PCS | Mod: ,,, | Performed by: INTERNAL MEDICINE

## 2022-01-01 PROCEDURE — 80202 ASSAY OF VANCOMYCIN: CPT | Performed by: INTERNAL MEDICINE

## 2022-01-01 PROCEDURE — 80053 COMPREHEN METABOLIC PANEL: CPT

## 2022-01-01 PROCEDURE — 93010 ELECTROCARDIOGRAM REPORT: CPT | Mod: 76,,, | Performed by: INTERNAL MEDICINE

## 2022-01-01 PROCEDURE — 83735 ASSAY OF MAGNESIUM: CPT | Mod: 91 | Performed by: INTERNAL MEDICINE

## 2022-01-01 PROCEDURE — 99232 PR SUBSEQUENT HOSPITAL CARE,LEVL II: ICD-10-PCS | Mod: GC,,, | Performed by: INTERNAL MEDICINE

## 2022-01-01 PROCEDURE — C1751 CATH, INF, PER/CENT/MIDLINE: HCPCS

## 2022-01-01 PROCEDURE — 20000000 HC ICU ROOM

## 2022-01-01 PROCEDURE — 99223 PR INITIAL HOSPITAL CARE,LEVL III: ICD-10-PCS | Mod: ,,, | Performed by: CLINICAL NURSE SPECIALIST

## 2022-01-01 PROCEDURE — 80202 ASSAY OF VANCOMYCIN: CPT | Mod: 91 | Performed by: STUDENT IN AN ORGANIZED HEALTH CARE EDUCATION/TRAINING PROGRAM

## 2022-01-01 PROCEDURE — 94761 N-INVAS EAR/PLS OXIMETRY MLT: CPT

## 2022-01-01 PROCEDURE — 85025 COMPLETE CBC W/AUTO DIFF WBC: CPT | Mod: 91 | Performed by: STUDENT IN AN ORGANIZED HEALTH CARE EDUCATION/TRAINING PROGRAM

## 2022-01-01 PROCEDURE — 99231 SBSQ HOSP IP/OBS SF/LOW 25: CPT | Mod: GC,,, | Performed by: INTERNAL MEDICINE

## 2022-01-01 PROCEDURE — 99223 PR INITIAL HOSPITAL CARE,LEVL III: ICD-10-PCS | Mod: GC,,, | Performed by: INTERNAL MEDICINE

## 2022-01-01 PROCEDURE — 90935 HEMODIALYSIS ONE EVALUATION: CPT

## 2022-01-01 PROCEDURE — 90935 PR HEMODIALYSIS, ONE EVALUATION: ICD-10-PCS | Mod: ,,, | Performed by: NURSE PRACTITIONER

## 2022-01-01 PROCEDURE — 99223 1ST HOSP IP/OBS HIGH 75: CPT | Mod: ,,, | Performed by: CLINICAL NURSE SPECIALIST

## 2022-01-01 PROCEDURE — 63600175 PHARM REV CODE 636 W HCPCS: Performed by: NURSE PRACTITIONER

## 2022-01-01 PROCEDURE — 84145 PROCALCITONIN (PCT): CPT | Performed by: STUDENT IN AN ORGANIZED HEALTH CARE EDUCATION/TRAINING PROGRAM

## 2022-01-01 PROCEDURE — 90945 PR DIALYSIS, NOT HEMO, 1 EVAL: ICD-10-PCS | Mod: GC,,, | Performed by: INTERNAL MEDICINE

## 2022-01-01 PROCEDURE — 83735 ASSAY OF MAGNESIUM: CPT

## 2022-01-01 PROCEDURE — 99239 PR HOSPITAL DISCHARGE DAY,>30 MIN: ICD-10-PCS | Mod: GC,,, | Performed by: INTERNAL MEDICINE

## 2022-01-01 PROCEDURE — 25000003 PHARM REV CODE 250: Performed by: INTERNAL MEDICINE

## 2022-01-01 PROCEDURE — 99291 CRITICAL CARE FIRST HOUR: CPT | Mod: CS,,, | Performed by: EMERGENCY MEDICINE

## 2022-01-01 PROCEDURE — 36415 COLL VENOUS BLD VENIPUNCTURE: CPT | Performed by: STUDENT IN AN ORGANIZED HEALTH CARE EDUCATION/TRAINING PROGRAM

## 2022-01-01 PROCEDURE — 96360 HYDRATION IV INFUSION INIT: CPT

## 2022-01-01 PROCEDURE — 86140 C-REACTIVE PROTEIN: CPT | Performed by: PHYSICIAN ASSISTANT

## 2022-01-01 PROCEDURE — 87522 HEPATITIS C REVRS TRNSCRPJ: CPT

## 2022-01-01 PROCEDURE — 82330 ASSAY OF CALCIUM: CPT | Performed by: INTERNAL MEDICINE

## 2022-01-01 PROCEDURE — 99497 ADVNCD CARE PLAN 30 MIN: CPT | Mod: 25,,, | Performed by: CLINICAL NURSE SPECIALIST

## 2022-01-01 PROCEDURE — 85025 COMPLETE CBC W/AUTO DIFF WBC: CPT

## 2022-01-01 PROCEDURE — 80074 ACUTE HEPATITIS PANEL: CPT

## 2022-01-01 PROCEDURE — 83735 ASSAY OF MAGNESIUM: CPT | Mod: 91 | Performed by: STUDENT IN AN ORGANIZED HEALTH CARE EDUCATION/TRAINING PROGRAM

## 2022-01-01 PROCEDURE — 37799 UNLISTED PX VASCULAR SURGERY: CPT

## 2022-01-01 PROCEDURE — 90935 PR HEMODIALYSIS, ONE EVALUATION: ICD-10-PCS | Mod: ,,, | Performed by: INTERNAL MEDICINE

## 2022-01-01 PROCEDURE — 80100008 HC CRRT DAILY MAINTENANCE

## 2022-01-01 PROCEDURE — 93010 ELECTROCARDIOGRAM REPORT: CPT | Mod: ,,, | Performed by: INTERNAL MEDICINE

## 2022-01-01 PROCEDURE — 99285 EMERGENCY DEPT VISIT HI MDM: CPT | Mod: 25

## 2022-01-01 PROCEDURE — 99233 PR SUBSEQUENT HOSPITAL CARE,LEVL III: ICD-10-PCS | Mod: GT,,, | Performed by: CLINICAL NURSE SPECIALIST

## 2022-01-01 PROCEDURE — 90945 DIALYSIS ONE EVALUATION: CPT | Mod: GC,,, | Performed by: INTERNAL MEDICINE

## 2022-01-01 PROCEDURE — 90935 HEMODIALYSIS ONE EVALUATION: CPT | Mod: ,,, | Performed by: INTERNAL MEDICINE

## 2022-01-01 PROCEDURE — 82330 ASSAY OF CALCIUM: CPT | Performed by: STUDENT IN AN ORGANIZED HEALTH CARE EDUCATION/TRAINING PROGRAM

## 2022-01-01 PROCEDURE — 83605 ASSAY OF LACTIC ACID: CPT

## 2022-01-01 PROCEDURE — 25500020 PHARM REV CODE 255: Performed by: INTERNAL MEDICINE

## 2022-01-01 PROCEDURE — 80053 COMPREHEN METABOLIC PANEL: CPT | Mod: 91 | Performed by: STUDENT IN AN ORGANIZED HEALTH CARE EDUCATION/TRAINING PROGRAM

## 2022-01-01 PROCEDURE — 25500020 PHARM REV CODE 255: Performed by: EMERGENCY MEDICINE

## 2022-01-01 PROCEDURE — 83605 ASSAY OF LACTIC ACID: CPT | Mod: 91 | Performed by: STUDENT IN AN ORGANIZED HEALTH CARE EDUCATION/TRAINING PROGRAM

## 2022-01-01 PROCEDURE — 99233 PR SUBSEQUENT HOSPITAL CARE,LEVL III: ICD-10-PCS | Mod: ,,, | Performed by: INTERNAL MEDICINE

## 2022-01-01 PROCEDURE — 99497 PR ADVNCD CARE PLAN 30 MIN: ICD-10-PCS | Mod: 25,,, | Performed by: CLINICAL NURSE SPECIALIST

## 2022-01-01 PROCEDURE — 84466 ASSAY OF TRANSFERRIN: CPT

## 2022-01-01 PROCEDURE — 99223 1ST HOSP IP/OBS HIGH 75: CPT | Mod: ,,, | Performed by: INTERNAL MEDICINE

## 2022-01-01 PROCEDURE — 99233 SBSQ HOSP IP/OBS HIGH 50: CPT | Mod: ,,, | Performed by: INTERNAL MEDICINE

## 2022-01-01 PROCEDURE — 87389 HIV-1 AG W/HIV-1&-2 AB AG IA: CPT | Performed by: EMERGENCY MEDICINE

## 2022-01-01 PROCEDURE — 99499 UNLISTED E&M SERVICE: CPT | Mod: ,,, | Performed by: INTERNAL MEDICINE

## 2022-01-01 PROCEDURE — 80069 RENAL FUNCTION PANEL: CPT | Mod: 91 | Performed by: STUDENT IN AN ORGANIZED HEALTH CARE EDUCATION/TRAINING PROGRAM

## 2022-01-01 PROCEDURE — 36415 COLL VENOUS BLD VENIPUNCTURE: CPT

## 2022-01-01 PROCEDURE — 85610 PROTHROMBIN TIME: CPT | Performed by: INTERNAL MEDICINE

## 2022-01-01 PROCEDURE — 85025 COMPLETE CBC W/AUTO DIFF WBC: CPT | Performed by: PHYSICIAN ASSISTANT

## 2022-01-01 PROCEDURE — 85730 THROMBOPLASTIN TIME PARTIAL: CPT | Performed by: INTERNAL MEDICINE

## 2022-01-01 PROCEDURE — 99497 PR ADVNCD CARE PLAN 30 MIN: ICD-10-PCS | Mod: ,,, | Performed by: CLINICAL NURSE SPECIALIST

## 2022-01-01 PROCEDURE — 27100171 HC OXYGEN HIGH FLOW UP TO 24 HOURS

## 2022-01-01 PROCEDURE — 99239 HOSP IP/OBS DSCHRG MGMT >30: CPT | Mod: GC,,, | Performed by: INTERNAL MEDICINE

## 2022-01-01 PROCEDURE — U0002 COVID-19 LAB TEST NON-CDC: HCPCS | Performed by: PHYSICIAN ASSISTANT

## 2022-01-01 PROCEDURE — 80053 COMPREHEN METABOLIC PANEL: CPT | Performed by: PHYSICIAN ASSISTANT

## 2022-01-01 PROCEDURE — 83880 ASSAY OF NATRIURETIC PEPTIDE: CPT

## 2022-01-01 PROCEDURE — 99232 SBSQ HOSP IP/OBS MODERATE 35: CPT | Mod: GC,,, | Performed by: INTERNAL MEDICINE

## 2022-01-01 PROCEDURE — 99284 EMERGENCY DEPT VISIT MOD MDM: CPT | Mod: 25

## 2022-01-01 PROCEDURE — 12000002 HC ACUTE/MED SURGE SEMI-PRIVATE ROOM

## 2022-01-01 PROCEDURE — 99291 PR CRITICAL CARE, E/M 30-74 MINUTES: ICD-10-PCS | Mod: CS,,, | Performed by: EMERGENCY MEDICINE

## 2022-01-01 RX ORDER — SODIUM CHLORIDE 0.9 % (FLUSH) 0.9 %
10 SYRINGE (ML) INJECTION EVERY 12 HOURS PRN
Status: DISCONTINUED | OUTPATIENT
Start: 2022-01-01 | End: 2022-01-01 | Stop reason: HOSPADM

## 2022-01-01 RX ORDER — MAGNESIUM SULFATE HEPTAHYDRATE 40 MG/ML
2 INJECTION, SOLUTION INTRAVENOUS
Status: DISCONTINUED | OUTPATIENT
Start: 2022-01-01 | End: 2022-01-01

## 2022-01-01 RX ORDER — LIDOCAINE HYDROCHLORIDE 10 MG/ML
1 INJECTION INFILTRATION; PERINEURAL ONCE
Status: COMPLETED | OUTPATIENT
Start: 2022-01-01 | End: 2022-01-01

## 2022-01-01 RX ORDER — SODIUM CHLORIDE 9 MG/ML
INJECTION, SOLUTION INTRAVENOUS CONTINUOUS
Status: CANCELLED | OUTPATIENT
Start: 2022-01-01 | End: 2022-01-01

## 2022-01-01 RX ORDER — DEXMEDETOMIDINE HYDROCHLORIDE 4 UG/ML
INJECTION, SOLUTION INTRAVENOUS
Status: COMPLETED
Start: 2022-01-01 | End: 2022-01-01

## 2022-01-01 RX ORDER — MUPIROCIN 20 MG/G
OINTMENT TOPICAL 2 TIMES DAILY
Status: DISCONTINUED | OUTPATIENT
Start: 2022-01-01 | End: 2022-01-01

## 2022-01-01 RX ORDER — HEPARIN SODIUM 5000 [USP'U]/ML
5000 INJECTION, SOLUTION INTRAVENOUS; SUBCUTANEOUS EVERY 8 HOURS
Status: DISCONTINUED | OUTPATIENT
Start: 2022-01-01 | End: 2022-01-01

## 2022-01-01 RX ORDER — CEFEPIME HYDROCHLORIDE 1 G/50ML
2 INJECTION, SOLUTION INTRAVENOUS
Status: DISCONTINUED | OUTPATIENT
Start: 2022-01-01 | End: 2022-01-01

## 2022-01-01 RX ORDER — NALOXONE HCL 0.4 MG/ML
0.02 VIAL (ML) INJECTION
Status: DISCONTINUED | OUTPATIENT
Start: 2022-01-01 | End: 2022-01-01

## 2022-01-01 RX ORDER — MAGNESIUM SULFATE HEPTAHYDRATE 40 MG/ML
2 INJECTION, SOLUTION INTRAVENOUS ONCE
Status: COMPLETED | OUTPATIENT
Start: 2022-01-01 | End: 2022-01-01

## 2022-01-01 RX ORDER — LORAZEPAM 2 MG/ML
1 INJECTION INTRAMUSCULAR
Status: DISCONTINUED | OUTPATIENT
Start: 2022-01-01 | End: 2022-01-01

## 2022-01-01 RX ORDER — IBUPROFEN 200 MG
16 TABLET ORAL
Status: DISCONTINUED | OUTPATIENT
Start: 2022-01-01 | End: 2022-01-01

## 2022-01-01 RX ORDER — MORPHINE SULFATE 2 MG/ML
2 INJECTION, SOLUTION INTRAMUSCULAR; INTRAVENOUS
Status: DISCONTINUED | OUTPATIENT
Start: 2022-01-01 | End: 2022-01-01

## 2022-01-01 RX ORDER — LORAZEPAM 2 MG/ML
1 INJECTION INTRAMUSCULAR EVERY 10 MIN PRN
Status: DISCONTINUED | OUTPATIENT
Start: 2022-01-01 | End: 2022-01-01

## 2022-01-01 RX ORDER — SODIUM CHLORIDE 9 MG/ML
INJECTION, SOLUTION INTRAVENOUS ONCE
Status: COMPLETED | OUTPATIENT
Start: 2022-01-01 | End: 2022-01-01

## 2022-01-01 RX ORDER — LORAZEPAM 2 MG/ML
INJECTION INTRAMUSCULAR
Status: COMPLETED
Start: 2022-01-01 | End: 2022-01-01

## 2022-01-01 RX ORDER — SODIUM CHLORIDE 9 MG/ML
INJECTION, SOLUTION INTRAVENOUS CONTINUOUS
Status: DISCONTINUED | OUTPATIENT
Start: 2022-01-01 | End: 2022-01-01 | Stop reason: HOSPADM

## 2022-01-01 RX ORDER — LORAZEPAM 2 MG/ML
1 INJECTION INTRAMUSCULAR ONCE
Status: COMPLETED | OUTPATIENT
Start: 2022-01-01 | End: 2022-01-01

## 2022-01-01 RX ORDER — ATORVASTATIN CALCIUM 40 MG/1
40 TABLET, FILM COATED ORAL DAILY
Status: DISCONTINUED | OUTPATIENT
Start: 2022-01-01 | End: 2022-01-01

## 2022-01-01 RX ORDER — GLUCAGON 1 MG
1 KIT INJECTION
Status: CANCELLED | OUTPATIENT
Start: 2022-01-01

## 2022-01-01 RX ORDER — LIDOCAINE HYDROCHLORIDE 10 MG/ML
INJECTION, SOLUTION EPIDURAL; INFILTRATION; INTRACAUDAL; PERINEURAL
Status: DISCONTINUED
Start: 2022-01-01 | End: 2022-01-01 | Stop reason: WASHOUT

## 2022-01-01 RX ORDER — LORAZEPAM 0.5 MG/1
1 TABLET ORAL EVERY 6 HOURS PRN
Status: DISCONTINUED | OUTPATIENT
Start: 2022-01-01 | End: 2022-01-01

## 2022-01-01 RX ORDER — MORPHINE SULFATE 1 MG/ML
0-10 INJECTION, SOLUTION INTRAVENOUS CONTINUOUS
Status: CANCELLED | OUTPATIENT
Start: 2022-01-01

## 2022-01-01 RX ORDER — GLUCAGON 1 MG
1 KIT INJECTION
Status: DISCONTINUED | OUTPATIENT
Start: 2022-01-01 | End: 2022-01-01

## 2022-01-01 RX ORDER — LORAZEPAM 2 MG/ML
2 INJECTION INTRAMUSCULAR
Status: DISCONTINUED | OUTPATIENT
Start: 2022-01-01 | End: 2022-01-01 | Stop reason: HOSPADM

## 2022-01-01 RX ORDER — HYDROXYZINE HYDROCHLORIDE 25 MG/1
25 TABLET, FILM COATED ORAL 2 TIMES DAILY
Status: DISCONTINUED | OUTPATIENT
Start: 2022-01-01 | End: 2022-01-01

## 2022-01-01 RX ORDER — SODIUM CHLORIDE 9 MG/ML
INJECTION, SOLUTION INTRAVENOUS ONCE
Status: DISCONTINUED | OUTPATIENT
Start: 2022-01-01 | End: 2022-01-01

## 2022-01-01 RX ORDER — HYDROXYZINE HYDROCHLORIDE 25 MG/1
25 TABLET, FILM COATED ORAL 3 TIMES DAILY PRN
Status: DISCONTINUED | OUTPATIENT
Start: 2022-01-01 | End: 2022-01-01

## 2022-01-01 RX ORDER — HEPARIN SODIUM 1000 [USP'U]/ML
1000 INJECTION, SOLUTION INTRAVENOUS; SUBCUTANEOUS
Status: COMPLETED | OUTPATIENT
Start: 2022-01-01 | End: 2022-01-01

## 2022-01-01 RX ORDER — ALBUTEROL SULFATE 90 UG/1
2 AEROSOL, METERED RESPIRATORY (INHALATION) EVERY 6 HOURS PRN
Status: DISCONTINUED | OUTPATIENT
Start: 2022-01-01 | End: 2022-01-01

## 2022-01-01 RX ORDER — HYDROCODONE BITARTRATE AND ACETAMINOPHEN 500; 5 MG/1; MG/1
TABLET ORAL CONTINUOUS
Status: DISCONTINUED | OUTPATIENT
Start: 2022-01-01 | End: 2022-01-01

## 2022-01-01 RX ORDER — DEXMEDETOMIDINE HYDROCHLORIDE 4 UG/ML
0-1.4 INJECTION, SOLUTION INTRAVENOUS CONTINUOUS
Status: DISCONTINUED | OUTPATIENT
Start: 2022-01-01 | End: 2022-01-01 | Stop reason: HOSPADM

## 2022-01-01 RX ORDER — PRAVASTATIN SODIUM 10 MG/1
40 TABLET ORAL DAILY
Status: DISCONTINUED | OUTPATIENT
Start: 2022-01-01 | End: 2022-01-01

## 2022-01-01 RX ORDER — INSULIN ASPART 100 [IU]/ML
0-5 INJECTION, SOLUTION INTRAVENOUS; SUBCUTANEOUS
Status: CANCELLED | OUTPATIENT
Start: 2022-01-01

## 2022-01-01 RX ORDER — NAPROXEN SODIUM 220 MG/1
81 TABLET, FILM COATED ORAL DAILY
Status: DISCONTINUED | OUTPATIENT
Start: 2022-01-01 | End: 2022-01-01

## 2022-01-01 RX ORDER — FENTANYL CITRATE 50 UG/ML
25 INJECTION, SOLUTION INTRAMUSCULAR; INTRAVENOUS ONCE
Status: COMPLETED | OUTPATIENT
Start: 2022-01-01 | End: 2022-01-01

## 2022-01-01 RX ORDER — LORAZEPAM 0.5 MG/1
1 TABLET ORAL EVERY 30 MIN PRN
Status: CANCELLED | OUTPATIENT
Start: 2022-01-01

## 2022-01-01 RX ORDER — IBUPROFEN 200 MG
24 TABLET ORAL
Status: CANCELLED | OUTPATIENT
Start: 2022-01-01

## 2022-01-01 RX ORDER — MORPHINE SULFATE 2 MG/ML
2 INJECTION, SOLUTION INTRAMUSCULAR; INTRAVENOUS EVERY 30 MIN PRN
Status: DISCONTINUED | OUTPATIENT
Start: 2022-01-01 | End: 2022-01-01 | Stop reason: HOSPADM

## 2022-01-01 RX ORDER — MORPHINE SULFATE 2 MG/ML
INJECTION, SOLUTION INTRAMUSCULAR; INTRAVENOUS
Status: DISPENSED
Start: 2022-01-01 | End: 2022-01-01

## 2022-01-01 RX ORDER — EPINEPHRINE 1 MG/ML
INJECTION, SOLUTION, CONCENTRATE INTRAVENOUS
Status: COMPLETED
Start: 2022-01-01 | End: 2022-01-01

## 2022-01-01 RX ORDER — CEFEPIME HYDROCHLORIDE 1 G/50ML
2 INJECTION, SOLUTION INTRAVENOUS
Status: CANCELLED | OUTPATIENT
Start: 2022-01-01

## 2022-01-01 RX ORDER — MIDODRINE HYDROCHLORIDE 5 MG/1
10 TABLET ORAL
Status: DISCONTINUED | OUTPATIENT
Start: 2022-01-01 | End: 2022-01-01

## 2022-01-01 RX ORDER — HYDROMORPHONE HYDROCHLORIDE 1 MG/ML
0.5 INJECTION, SOLUTION INTRAMUSCULAR; INTRAVENOUS; SUBCUTANEOUS ONCE
Status: COMPLETED | OUTPATIENT
Start: 2022-01-01 | End: 2022-01-01

## 2022-01-01 RX ORDER — DEXMEDETOMIDINE HYDROCHLORIDE 4 UG/ML
0-1.4 INJECTION, SOLUTION INTRAVENOUS CONTINUOUS
Status: DISCONTINUED | OUTPATIENT
Start: 2022-01-01 | End: 2022-01-01

## 2022-01-01 RX ORDER — SCOLOPAMINE TRANSDERMAL SYSTEM 1 MG/1
1 PATCH, EXTENDED RELEASE TRANSDERMAL
Status: DISCONTINUED | OUTPATIENT
Start: 2022-01-01 | End: 2022-01-01 | Stop reason: HOSPADM

## 2022-01-01 RX ORDER — NOREPINEPHRINE BITARTRATE/D5W 4MG/250ML
PLASTIC BAG, INJECTION (ML) INTRAVENOUS
Status: DISPENSED
Start: 2022-01-01 | End: 2022-01-01

## 2022-01-01 RX ORDER — IBUPROFEN 200 MG
16 TABLET ORAL
Status: CANCELLED | OUTPATIENT
Start: 2022-01-01

## 2022-01-01 RX ORDER — DOBUTAMINE HYDROCHLORIDE 400 MG/100ML
5 INJECTION INTRAVENOUS CONTINUOUS
Status: DISCONTINUED | OUTPATIENT
Start: 2022-01-01 | End: 2022-01-01

## 2022-01-01 RX ORDER — MORPHINE SULFATE 2 MG/ML
3 INJECTION, SOLUTION INTRAMUSCULAR; INTRAVENOUS EVERY 30 MIN PRN
Status: DISCONTINUED | OUTPATIENT
Start: 2022-01-01 | End: 2022-01-01

## 2022-01-01 RX ORDER — IBUPROFEN 200 MG
24 TABLET ORAL
Status: DISCONTINUED | OUTPATIENT
Start: 2022-01-01 | End: 2022-01-01

## 2022-01-01 RX ORDER — MORPHINE SULFATE 2 MG/ML
2 INJECTION, SOLUTION INTRAMUSCULAR; INTRAVENOUS EVERY 4 HOURS PRN
Status: DISCONTINUED | OUTPATIENT
Start: 2022-01-01 | End: 2022-01-01

## 2022-01-01 RX ORDER — LIDOCAINE HYDROCHLORIDE 10 MG/ML
INJECTION INFILTRATION; PERINEURAL
Status: DISCONTINUED
Start: 2022-01-01 | End: 2022-01-01 | Stop reason: WASHOUT

## 2022-01-01 RX ORDER — MIDODRINE HYDROCHLORIDE 5 MG/1
10 TABLET ORAL 3 TIMES DAILY
Status: DISCONTINUED | OUTPATIENT
Start: 2022-01-01 | End: 2022-01-01

## 2022-01-01 RX ADMIN — LORAZEPAM 1 MG: 2 INJECTION INTRAMUSCULAR; INTRAVENOUS at 10:10

## 2022-01-01 RX ADMIN — Medication 0.34 MCG/KG/MIN: at 06:10

## 2022-01-01 RX ADMIN — DEXMEDETOMIDINE HYDROCHLORIDE 1.4 MCG/KG/HR: 4 INJECTION INTRAVENOUS at 02:10

## 2022-01-01 RX ADMIN — CEFEPIME HYDROCHLORIDE 2 G: 2 INJECTION, SOLUTION INTRAVENOUS at 11:10

## 2022-01-01 RX ADMIN — MORPHINE SULFATE 2 MG: 2 INJECTION, SOLUTION INTRAMUSCULAR; INTRAVENOUS at 09:10

## 2022-01-01 RX ADMIN — HUMAN ALBUMIN MICROSPHERES AND PERFLUTREN 0.66 MG: 10; .22 INJECTION, SOLUTION INTRAVENOUS at 09:10

## 2022-01-01 RX ADMIN — IOHEXOL 100 ML: 350 INJECTION, SOLUTION INTRAVENOUS at 07:10

## 2022-01-01 RX ADMIN — MORPHINE SULFATE 2 MG: 2 INJECTION, SOLUTION INTRAMUSCULAR; INTRAVENOUS at 11:10

## 2022-01-01 RX ADMIN — MORPHINE SULFATE 2 MG: 2 INJECTION, SOLUTION INTRAMUSCULAR; INTRAVENOUS at 08:10

## 2022-01-01 RX ADMIN — CEFEPIME HYDROCHLORIDE 2 G: 2 INJECTION, SOLUTION INTRAVENOUS at 09:10

## 2022-01-01 RX ADMIN — VASOPRESSIN 0.04 UNITS/MIN: 20 INJECTION INTRAVENOUS at 12:10

## 2022-01-01 RX ADMIN — MORPHINE SULFATE 2 MG: 2 INJECTION, SOLUTION INTRAMUSCULAR; INTRAVENOUS at 06:10

## 2022-01-01 RX ADMIN — MORPHINE SULFATE 2 MG: 2 INJECTION, SOLUTION INTRAMUSCULAR; INTRAVENOUS at 04:10

## 2022-01-01 RX ADMIN — HEPARIN SODIUM 1000 UNITS: 1000 INJECTION, SOLUTION INTRAVENOUS; SUBCUTANEOUS at 03:10

## 2022-01-01 RX ADMIN — MAGNESIUM SULFATE 2 G: 2 INJECTION INTRAVENOUS at 12:10

## 2022-01-01 RX ADMIN — ASPIRIN 81 MG: 81 TABLET, CHEWABLE ORAL at 08:10

## 2022-01-01 RX ADMIN — MORPHINE SULFATE 2 MG: 2 INJECTION, SOLUTION INTRAMUSCULAR; INTRAVENOUS at 02:10

## 2022-01-01 RX ADMIN — MORPHINE SULFATE 2 MG: 2 INJECTION, SOLUTION INTRAMUSCULAR; INTRAVENOUS at 07:10

## 2022-01-01 RX ADMIN — SODIUM PHOSPHATE, MONOBASIC, MONOHYDRATE 39.99 MMOL: 276; 142 INJECTION, SOLUTION INTRAVENOUS at 12:10

## 2022-01-01 RX ADMIN — Medication 0.2 MCG/KG/MIN: at 11:10

## 2022-01-01 RX ADMIN — DOBUTAMINE HYDROCHLORIDE 5 MCG/KG/MIN: 400 INJECTION INTRAVENOUS at 01:10

## 2022-01-01 RX ADMIN — Medication 0.07 MCG/KG/MIN: at 09:10

## 2022-01-01 RX ADMIN — CALCIUM GLUCONATE: 98 INJECTION, SOLUTION INTRAVENOUS at 11:10

## 2022-01-01 RX ADMIN — VANCOMYCIN HYDROCHLORIDE 1250 MG: 1.25 INJECTION, POWDER, LYOPHILIZED, FOR SOLUTION INTRAVENOUS at 03:10

## 2022-01-01 RX ADMIN — MORPHINE SULFATE 2 MG: 2 INJECTION, SOLUTION INTRAMUSCULAR; INTRAVENOUS at 03:10

## 2022-01-01 RX ADMIN — Medication 0.2 MCG/KG/MIN: at 10:10

## 2022-01-01 RX ADMIN — DEXMEDETOMIDINE HYDROCHLORIDE 1.4 MCG/KG/HR: 4 INJECTION INTRAVENOUS at 10:10

## 2022-01-01 RX ADMIN — DOBUTAMINE HYDROCHLORIDE 2.5 MCG/KG/MIN: 400 INJECTION INTRAVENOUS at 09:10

## 2022-01-01 RX ADMIN — DEXMEDETOMIDINE HYDROCHLORIDE 1.4 MCG/KG/HR: 4 INJECTION INTRAVENOUS at 11:10

## 2022-01-01 RX ADMIN — MUPIROCIN: 20 OINTMENT TOPICAL at 09:10

## 2022-01-01 RX ADMIN — Medication 0.3 MCG/KG/MIN: at 09:10

## 2022-01-01 RX ADMIN — SODIUM CHLORIDE: 0.9 INJECTION, SOLUTION INTRAVENOUS at 02:10

## 2022-01-01 RX ADMIN — VANCOMYCIN HYDROCHLORIDE 1250 MG: 1.25 INJECTION, POWDER, LYOPHILIZED, FOR SOLUTION INTRAVENOUS at 10:10

## 2022-01-01 RX ADMIN — SODIUM CHLORIDE: 0.9 INJECTION, SOLUTION INTRAVENOUS at 09:10

## 2022-01-01 RX ADMIN — DOBUTAMINE HYDROCHLORIDE 5 MCG/KG/MIN: 400 INJECTION INTRAVENOUS at 02:10

## 2022-01-01 RX ADMIN — Medication 0.24 MCG/KG/MIN: at 05:10

## 2022-01-01 RX ADMIN — HYDROXYZINE HYDROCHLORIDE 25 MG: 25 TABLET, FILM COATED ORAL at 12:10

## 2022-01-01 RX ADMIN — LIDOCAINE HYDROCHLORIDE 1 ML: 10 INJECTION, SOLUTION EPIDURAL; INFILTRATION; INTRACAUDAL at 02:10

## 2022-01-01 RX ADMIN — LORAZEPAM 1 MG: 2 INJECTION INTRAMUSCULAR; INTRAVENOUS at 05:10

## 2022-01-01 RX ADMIN — VASOPRESSIN 0.04 UNITS/MIN: 20 INJECTION INTRAVENOUS at 02:10

## 2022-01-01 RX ADMIN — DEXMEDETOMIDINE HYDROCHLORIDE 1.4 MCG/KG/HR: 4 INJECTION INTRAVENOUS at 09:10

## 2022-01-01 RX ADMIN — VASOPRESSIN 0.04 UNITS/MIN: 20 INJECTION INTRAVENOUS at 05:10

## 2022-01-01 RX ADMIN — SODIUM CHLORIDE 100 ML/HR: 0.9 INJECTION, SOLUTION INTRAVENOUS at 12:10

## 2022-01-01 RX ADMIN — DEXMEDETOMIDINE HYDROCHLORIDE 1.4 MCG/KG/HR: 4 INJECTION INTRAVENOUS at 06:10

## 2022-01-01 RX ADMIN — LORAZEPAM 1 MG: 2 INJECTION INTRAMUSCULAR; INTRAVENOUS at 02:10

## 2022-01-01 RX ADMIN — LORAZEPAM 1 MG: 2 INJECTION INTRAMUSCULAR; INTRAVENOUS at 01:10

## 2022-01-01 RX ADMIN — VASOPRESSIN 0.04 UNITS/MIN: 20 INJECTION INTRAVENOUS at 07:10

## 2022-01-01 RX ADMIN — DOBUTAMINE HYDROCHLORIDE 5 MCG/KG/MIN: 400 INJECTION INTRAVENOUS at 09:10

## 2022-01-01 RX ADMIN — NOREPINEPHRINE BITARTRATE 0.02 MCG/KG/MIN: 1 INJECTION, SOLUTION, CONCENTRATE INTRAVENOUS at 10:10

## 2022-01-01 RX ADMIN — DEXMEDETOMIDINE HYDROCHLORIDE 1.4 MCG/KG/HR: 4 INJECTION INTRAVENOUS at 03:10

## 2022-01-01 RX ADMIN — EPINEPHRINE 1 MG: 1 INJECTION, SOLUTION, CONCENTRATE INTRAVENOUS at 11:10

## 2022-01-01 RX ADMIN — DEXMEDETOMIDINE HYDROCHLORIDE 1 MCG/KG/HR: 4 INJECTION INTRAVENOUS at 10:10

## 2022-01-01 RX ADMIN — LORAZEPAM 1 MG: 2 INJECTION INTRAMUSCULAR; INTRAVENOUS at 07:10

## 2022-01-01 RX ADMIN — LORAZEPAM 1 MG: 2 INJECTION INTRAMUSCULAR; INTRAVENOUS at 06:10

## 2022-01-01 RX ADMIN — MORPHINE SULFATE 2 MG: 2 INJECTION, SOLUTION INTRAMUSCULAR; INTRAVENOUS at 12:10

## 2022-01-01 RX ADMIN — CEFEPIME HYDROCHLORIDE 2 G: 2 INJECTION, SOLUTION INTRAVENOUS at 10:10

## 2022-01-01 RX ADMIN — DEXMEDETOMIDINE HYDROCHLORIDE 1.4 MCG/KG/HR: 4 INJECTION INTRAVENOUS at 04:10

## 2022-01-01 RX ADMIN — DEXMEDETOMIDINE HYDROCHLORIDE 1.4 MCG/KG/HR: 4 INJECTION INTRAVENOUS at 12:10

## 2022-01-01 RX ADMIN — DEXMEDETOMIDINE HYDROCHLORIDE 1.4 MCG/KG/HR: 4 INJECTION INTRAVENOUS at 05:10

## 2022-01-01 RX ADMIN — SODIUM PHOSPHATE, MONOBASIC, MONOHYDRATE 39.99 MMOL: 276; 142 INJECTION, SOLUTION INTRAVENOUS at 08:10

## 2022-01-01 RX ADMIN — MORPHINE SULFATE 2 MG: 2 INJECTION, SOLUTION INTRAMUSCULAR; INTRAVENOUS at 05:10

## 2022-01-01 RX ADMIN — DEXMEDETOMIDINE HYDROCHLORIDE 1.4 MCG/KG/HR: 4 INJECTION INTRAVENOUS at 01:10

## 2022-01-01 RX ADMIN — FENTANYL CITRATE 25 MCG: 50 INJECTION, SOLUTION INTRAMUSCULAR; INTRAVENOUS at 09:10

## 2022-01-01 RX ADMIN — VASOPRESSIN 0.04 UNITS/MIN: 20 INJECTION INTRAVENOUS at 09:10

## 2022-01-01 RX ADMIN — LORAZEPAM 2 MG: 2 INJECTION INTRAMUSCULAR; INTRAVENOUS at 11:10

## 2022-01-01 RX ADMIN — Medication 0.34 MCG/KG/MIN: at 12:10

## 2022-01-01 RX ADMIN — MORPHINE SULFATE 2 MG: 2 INJECTION, SOLUTION INTRAMUSCULAR; INTRAVENOUS at 01:10

## 2022-01-01 RX ADMIN — Medication 0.3 MCG/KG/MIN: at 10:10

## 2022-01-01 RX ADMIN — HYDROMORPHONE HYDROCHLORIDE 0.5 MG: 1 INJECTION, SOLUTION INTRAMUSCULAR; INTRAVENOUS; SUBCUTANEOUS at 06:10

## 2022-01-01 RX ADMIN — SODIUM CHLORIDE 500 ML: 0.9 INJECTION, SOLUTION INTRAVENOUS at 08:10

## 2022-01-01 RX ADMIN — LORAZEPAM 1 MG: 2 INJECTION INTRAMUSCULAR; INTRAVENOUS at 11:10

## 2022-01-01 RX ADMIN — VANCOMYCIN HYDROCHLORIDE 1500 MG: 1.5 INJECTION, POWDER, LYOPHILIZED, FOR SOLUTION INTRAVENOUS at 10:10

## 2022-01-01 RX ADMIN — DOBUTAMINE HYDROCHLORIDE 5 MCG/KG/MIN: 400 INJECTION INTRAVENOUS at 05:10

## 2022-01-01 RX ADMIN — DEXTROSE MONOHYDRATE, SODIUM CITRATE, AND CITRIC ACID MONOHYDRATE: 2.45; 2.2; .8 INJECTION, SOLUTION INTRAVENOUS at 11:10

## 2022-01-01 RX ADMIN — MUPIROCIN: 20 OINTMENT TOPICAL at 11:10

## 2022-01-01 RX ADMIN — DEXMEDETOMIDINE HYDROCHLORIDE 0.4 MCG/KG/HR: 4 INJECTION INTRAVENOUS at 11:10

## 2022-01-01 RX ADMIN — SCOPALAMINE 1 PATCH: 1 PATCH, EXTENDED RELEASE TRANSDERMAL at 01:10

## 2022-01-01 RX ADMIN — VASOPRESSIN 0.04 UNITS/MIN: 20 INJECTION INTRAVENOUS at 11:10

## 2022-01-01 RX ADMIN — DEXMEDETOMIDINE HYDROCHLORIDE 0.5 MCG/KG/HR: 4 INJECTION INTRAVENOUS at 03:10

## 2022-01-01 RX ADMIN — Medication 0.2 MCG/KG/MIN: at 06:10

## 2022-01-01 RX ADMIN — MAGNESIUM SULFATE 2 G: 2 INJECTION INTRAVENOUS at 09:10

## 2022-01-01 RX ADMIN — VASOPRESSIN 0.04 UNITS/MIN: 20 INJECTION INTRAVENOUS at 08:10

## 2022-01-01 RX ADMIN — LORAZEPAM 1 MG: 0.5 TABLET ORAL at 08:10

## 2022-01-01 RX ADMIN — ASPIRIN 81 MG: 81 TABLET, CHEWABLE ORAL at 09:10

## 2022-01-01 RX ADMIN — HYDROXYZINE HYDROCHLORIDE 25 MG: 25 TABLET, FILM COATED ORAL at 02:10

## 2022-01-01 RX ADMIN — DEXMEDETOMIDINE HYDROCHLORIDE 1.4 MCG/KG/HR: 4 INJECTION INTRAVENOUS at 07:10

## 2022-01-01 RX ADMIN — Medication 0.2 MCG/KG/MIN: at 12:10

## 2022-01-01 RX ADMIN — Medication 0.2 MCG/KG/MIN: at 05:10

## 2022-01-01 RX ADMIN — MIDODRINE HYDROCHLORIDE 10 MG: 5 TABLET ORAL at 08:10

## 2022-01-01 RX ADMIN — MIDODRINE HYDROCHLORIDE 10 MG: 5 TABLET ORAL at 11:10

## 2022-01-01 RX ADMIN — CALCIUM GLUCONATE: 98 INJECTION, SOLUTION INTRAVENOUS at 05:10

## 2022-01-01 RX ADMIN — MORPHINE SULFATE 3 MG: 2 INJECTION, SOLUTION INTRAMUSCULAR; INTRAVENOUS at 06:10

## 2022-01-01 RX ADMIN — ATORVASTATIN CALCIUM 40 MG: 40 TABLET, FILM COATED ORAL at 08:10

## 2022-01-01 RX ADMIN — ATORVASTATIN CALCIUM 40 MG: 40 TABLET, FILM COATED ORAL at 09:10

## 2022-01-01 RX ADMIN — HEPARIN SODIUM 5000 UNITS: 5000 INJECTION INTRAVENOUS; SUBCUTANEOUS at 06:10

## 2022-01-01 RX ADMIN — Medication 0.24 MCG/KG/MIN: at 04:10

## 2022-01-01 RX ADMIN — CALCIUM GLUCONATE: 98 INJECTION, SOLUTION INTRAVENOUS at 07:10

## 2022-01-01 RX ADMIN — MORPHINE SULFATE 2 MG: 2 INJECTION, SOLUTION INTRAMUSCULAR; INTRAVENOUS at 10:10

## 2022-01-01 RX ADMIN — Medication 0.18 MCG/KG/MIN: at 05:10

## 2022-01-01 RX ADMIN — Medication 0.02 MCG/KG/MIN: at 02:10

## 2022-01-01 RX ADMIN — Medication 0.34 MCG/KG/MIN: at 03:10

## 2022-01-01 RX ADMIN — MUPIROCIN: 20 OINTMENT TOPICAL at 08:10

## 2022-01-01 RX ADMIN — LORAZEPAM 1 MG: 2 INJECTION INTRAMUSCULAR at 02:10

## 2022-01-01 RX ADMIN — DOBUTAMINE HYDROCHLORIDE 5 MCG/KG/MIN: 400 INJECTION INTRAVENOUS at 08:10

## 2022-01-01 RX ADMIN — DEXMEDETOMIDINE HYDROCHLORIDE 0.6 MCG/KG/HR: 4 INJECTION INTRAVENOUS at 06:10

## 2022-05-25 NOTE — ED NOTES
Nayan Hernandez, a 66 y.o. male presents to the ED w/ complaint of missed dialysis, pt reports new dialysis and last dialysis Friday. Pt aaox4, bilateral lower extremity swelling, abdominal swelling. Denies any complaints, denies chest pain/palpitations. Reports still making urine, pt arrives with dialysis site un dressed    Triage note:  Chief Complaint   Patient presents with    Multiple complaints     Missed 2 dialysis, told to come get blood checked     Review of patient's allergies indicates:  No Known Allergies  Past Medical History:   Diagnosis Date    Aortic dissection     Juan Carlos Type A     Cardiac defibrillator in place     CHF (congestive heart failure) 12/15/14    EF 15-20%    Coronary artery disease     ETOH abuse     Hepatitis C antibody test positive     Hypertension     S/P aortic dissection repair 2011      LOC: The patient is awake, alert and aware of environment with an appropriate affect, the patient is oriented x 3 and speaking appropriately.   APPEARANCE: Patient appears comfortable and in no acute distress, patient is clean and well groomed.  SKIN: The skin is warm and dry, color consistent with ethnicity, patient has normal skin turgor and moist mucus membranes, skin intact, no breakdown or bruising noted.   MUSCULOSKELETAL: Patient moving all extremities spontaneously, no swelling noted.  RESPIRATORY: Airway is open and patent, respirations are spontaneous, patient has a normal effort and rate, no accessory muscle use noted, pt placed on continuous pulse ox with O2 sats noted at 97% on room air.  CARDIAC: Pt placed on cardiac monitor. Patient has a normal rate and regular rhythm, bilateral lower extremity edema noted, capillary refill < 3 seconds.   GASTRO: Soft and non tender to palpation, no distention noted, normoactive bowel sounds present in all four quadrants. Pt states bowel movements have been regular.  : Pt denies any pain or frequency with urination.  NEURO: Pt opens  eyes spontaneously, behavior appropriate to situation, follows commands, facial expression symmetrical, bilateral hand grasp equal and even, purposeful motor response noted, normal sensation in all extremities when touched with a finger.

## 2022-05-25 NOTE — DISCHARGE INSTRUCTIONS
Go to your dialysis appointment this afternoon. It is important that you go to your regular dialysis appointments. If you have any issue getting dialysis then return to the Emergency Department.

## 2022-05-25 NOTE — ED PROVIDER NOTES
Encounter Date: 5/25/2022       History     Chief Complaint   Patient presents with    Multiple complaints     Missed 2 dialysis, told to come get blood checked     66-year-old male with PMHX of hepatitis C, c-CHF with 10%EF, CAD, moderate mitral valve regurgitation, moderate tricuspid valve regurgitation, HTN, HLD, and pulmonary HTN end-stage renal disease presents emergency department because of missed dialysis sessions.  Patient explains that he went to dialysis today but because he has missed 2 sessions they referred him to the emergency department for evaluation.  The patient is without any complaints.  He denies chest pain, cough, fever, palpitations.  Patient reports shortness of breath that is chronic, states he is in need of a refill of an inhaler.  Admits to continued cigarette smoking.  He does report or increased swelling in his lower extremities and abdomen, but denies any pain.  States he has been in his normal state of health. Pt still produces some urine.     The history is provided by the patient. No  was used.     Review of patient's allergies indicates:  No Known Allergies  Past Medical History:   Diagnosis Date    Aortic dissection     Juan Carlos Type A     Cardiac defibrillator in place     CHF (congestive heart failure) 12/15/14    EF 15-20%    Coronary artery disease     ETOH abuse     Hepatitis C antibody test positive     Hypertension     S/P aortic dissection repair 2011     Past Surgical History:   Procedure Laterality Date    CORONARY ARTERY BYPASS GRAFT       No family history on file.  Social History     Tobacco Use    Smoking status: Current Every Day Smoker    Smokeless tobacco: Never Used   Substance Use Topics    Alcohol use: Yes     Alcohol/week: 0.0 standard drinks     Types: 1 - 2 drink(s) per week     Review of Systems   Constitutional: Negative for chills and fever.   HENT: Negative for rhinorrhea and sore throat.    Respiratory: Negative for  cough and shortness of breath.    Cardiovascular: Positive for leg swelling. Negative for chest pain.   Gastrointestinal: Positive for abdominal distention. Negative for abdominal pain, diarrhea, nausea and vomiting.   Genitourinary: Negative for dysuria and hematuria.   Musculoskeletal: Negative for back pain and neck pain.   Skin: Negative for rash and wound.   Neurological: Negative for seizures, weakness and headaches.   Hematological: Does not bruise/bleed easily.   Psychiatric/Behavioral: Negative for agitation and behavioral problems.       Physical Exam     Initial Vitals [05/25/22 1047]   BP Pulse Resp Temp SpO2   129/71 67 18 97.5 °F (36.4 °C) 100 %      MAP       --         Physical Exam    Nursing note and vitals reviewed.  Constitutional: He appears well-developed and well-nourished. He is not diaphoretic. No distress.   HENT:   Head: Normocephalic and atraumatic.   Eyes: Conjunctivae and EOM are normal.   Neck: Neck supple.   Normal range of motion.  Cardiovascular: Normal rate, regular rhythm, normal heart sounds and intact distal pulses.   Pulmonary/Chest: Breath sounds normal. No respiratory distress. He has no wheezes. He has no rhonchi. He has no rales.   Port right upper anterior chest    Abdominal: Abdomen is soft. Bowel sounds are normal. He exhibits distension. There is no abdominal tenderness.   Reducible umbilical hernia.  There is no rebound and no guarding.   Musculoskeletal:         General: Edema ( BLE edema, symmetric to the mid shin) present. No tenderness. Normal range of motion.      Cervical back: Normal range of motion and neck supple.     Neurological: He is alert. He has normal strength.   Skin: Skin is warm and dry.   Psychiatric: He has a normal mood and affect. Thought content normal.         ED Course   Procedures  Labs Reviewed   CBC W/ AUTO DIFFERENTIAL - Abnormal; Notable for the following components:       Result Value    WBC 3.60 (*)     RBC 2.80 (*)     Hemoglobin 7.6  (*)     Hematocrit 23.9 (*)     MCHC 31.8 (*)     RDW 20.9 (*)     Gran # (ANC) 1.7 (*)     Eosinophil % 12.5 (*)     All other components within normal limits    Narrative:     Release to patient->Immediate   COMPREHENSIVE METABOLIC PANEL - Abnormal; Notable for the following components:    CO2 20 (*)     BUN 59 (*)     Creatinine 5.4 (*)     Calcium 8.5 (*)     Total Protein 8.6 (*)     Albumin 3.1 (*)     eGFR if  11.6 (*)     eGFR if non  10.0 (*)     All other components within normal limits    Narrative:     Release to patient->Immediate   HIV 1 / 2 ANTIBODY   ISTAT CHEM8     EKG Readings: (Independently Interpreted)   Initial Reading: No STEMI. Previous EKG: Compared with most recent EKG Rhythm: Paced Rhythm. Heart Rate: 60. Conduction: LAFB. ST Segments: Normal ST Segments. T Waves: Normal.     ECG Results          EKG 12-lead (In process)  Result time 05/25/22 12:21:59    In process by Interface, Lab In [facility] (05/25/22 12:21:59)                 Narrative:    Test Reason : E87.5,    Vent. Rate : 060 BPM     Atrial Rate : 060 BPM     P-R Int : 240 ms          QRS Dur : 160 ms      QT Int : 514 ms       P-R-T Axes : 077 -59 113 degrees     QTc Int : 514 ms    Atrial-paced rhythm with prolonged AV conduction  Right bundle branch block  Left anterior fascicular block   Bifascicular block   Abnormal ECG  When compared with ECG of 03-NOV-2017 20:29,  Electronic atrial pacemaker has replaced Sinus rhythm  Vent. rate has decreased BY  35 BPM  (RBBB and left anterior fascicular block) is now Present  Minimal criteria for Anterior infarct are no longer Present    Referred By: [email or web address]   SELF           Confirmed By:                             Imaging Results    None          Medications - No data to display  Medical Decision Making:   Initial Assessment:   PMHX of hepatitis C, c-CHF with 10%EF, CAD, moderate mitral valve regurgitation, moderate tricuspid valve regurgitation, HTN,  HLD, and pulmonary HTN presenting for missed dialysis. Pt still produces some urine. He is without complaints. EKG paced rhythm with normal intervals and no peaked T waves. Will check labs.   Differential Diagnosis:   Hyperkalemia, uremia, volume overload  Clinical Tests:   Lab Tests: Ordered and Reviewed  Medical Tests: Ordered and Reviewed  ED Management:  Potassium 4.4. Pt remains stable and without complaints. Discussed with pt dialysis center who said he can come this afternoon for dialysis session. He is stable for dc and outpatient f/u.              ED Course as of 05/25/22 1340   Wed May 25, 2022   1330 Potassium: 4.4 [KL]      ED Course User Index  [KL] Monique Adams MD             Clinical Impression:   Final diagnoses:  [E87.5] Missed dialysis  [N18.6, Z99.2] ESRD (end stage renal disease) on dialysis (Primary)          ED Disposition Condition    Discharge Stable        ED Prescriptions     None        Follow-up Information     Follow up With Specialties Details Why Contact Info    Eusebia Brady NP Family Medicine Schedule an appointment as soon as possible for a visit in 2 days  [address] [facility]  [place] [address]  [phone]      [facility] - Emergency Dept Emergency Medicine Go to  As needed, If symptoms worsen [address] [facility] [address] [phone]           Monique Adams MD  Resident  05/25/22 1114       Monique Adams MD  Resident  05/25/22 1129       Monique Adams MD  Resident  05/25/22 1340

## 2022-05-25 NOTE — CONSULTS
Consult reviewed. Labs reviewed. [facility] was [facility]. Pt is ok to return to dialysis unit this afternoon with a copy of his labs. Discussed with ED staff.     Sylvia Zepeda DNP-FNP, C   Nephrology

## 2022-05-25 NOTE — ED NOTES
Called [facility] [address] for [place]t, per facility pt to go to appt on Friday @1:30, pt notified, called pt daughter who helps pt and also notified

## 2022-08-25 NOTE — TELEPHONE ENCOUNTER
Spoke with andreas and Keli and informed to get labs on 8/27. Lab apptm. made with them. Informed he needs to be NPO after midnight the night before the procedure and to take his 3 BP meds. with a sip of water that morning of the procedure. Instructed no aspirin or blood thinners between now and the procedure. Instructed needs a ride home after the procedure.

## 2022-10-21 PROBLEM — N18.6 ESRD (END STAGE RENAL DISEASE): Status: ACTIVE | Noted: 2022-01-01

## 2022-10-21 PROBLEM — I71.010 ASCENDING AORTIC DISSECTION: Status: ACTIVE | Noted: 2022-01-01

## 2022-10-21 PROBLEM — N50.89 SCROTAL EDEMA: Status: ACTIVE | Noted: 2022-01-01

## 2022-10-21 PROBLEM — E87.70 VOLUME OVERLOAD: Status: ACTIVE | Noted: 2022-01-01

## 2022-10-21 PROBLEM — I50.20 HFREF (HEART FAILURE WITH REDUCED EJECTION FRACTION): Status: ACTIVE | Noted: 2022-01-01

## 2022-10-21 PROBLEM — I71.012 DISSECTING ANEURYSM OF THORACIC AORTA, STANFORD TYPE B: Status: ACTIVE | Noted: 2022-01-01

## 2022-10-21 PROBLEM — I95.9 HYPOTENSION: Status: ACTIVE | Noted: 2022-01-01

## 2022-10-21 PROBLEM — D63.1 ANEMIA DUE TO CHRONIC KIDNEY DISEASE: Status: ACTIVE | Noted: 2022-01-01

## 2022-10-21 PROBLEM — N18.9 ANEMIA DUE TO CHRONIC KIDNEY DISEASE: Status: ACTIVE | Noted: 2022-01-01

## 2022-10-21 PROBLEM — E87.20 LACTIC ACIDOSIS: Status: ACTIVE | Noted: 2022-01-01

## 2022-10-21 NOTE — ED PROVIDER NOTES
Encounter Date: 10/21/2022       History     Chief Complaint   Patient presents with    Hypotension     Patient presents from dialysis via EMS for further evaluation of low blood pressure. EMS reports that patient was unable to receive dialysis session secondary to hypotension and dialysis RN concern that right groin hemodialysis access site is infected. Last session was on Wednesday. Patient is A&ox4 and following commands.      66 yo M PMHx of hepatitis C, CHF with 10%EF, CAD, moderate mitral valve regurgitation, moderate tricuspid valve regurgitation, HTN, HLD, pulmonary HTN, end-stage renal disease on HD MWF who presents to the ED from dialysis with a report chief complaint of hypotension. He did not receive dialysis today. He complains of shortness of breath, lower extremity edema, and testicular swelling. He states that this began about one week ago. He also reports pain with urination. Denies fever, chills, cp, abd pain, NVD. He was last dialyzed on Wednesday. He denies other worsening or alleviating factors.     Review of patient's allergies indicates:  No Known Allergies  Past Medical History:   Diagnosis Date    Aortic dissection     Juan Carlos Type A     Cardiac defibrillator in place     CHF (congestive heart failure) 12/15/14    EF 15-20%    Coronary artery disease     ETOH abuse     Hepatitis C antibody test positive     Hypertension     S/P aortic dissection repair 2011     Past Surgical History:   Procedure Laterality Date    CORONARY ARTERY BYPASS GRAFT       No family history on file.  Social History     Tobacco Use    Smoking status: Every Day    Smokeless tobacco: Never   Substance Use Topics    Alcohol use: Yes     Alcohol/week: 0.0 standard drinks     Types: 1 - 2 drink(s) per week     Review of Systems   Constitutional:  Negative for fever.   HENT:  Negative for sore throat.    Respiratory:  Positive for shortness of breath.    Cardiovascular:  Positive for leg swelling. Negative for chest pain.    Gastrointestinal:  Negative for nausea.   Genitourinary:  Positive for testicular pain. Negative for dysuria.   Musculoskeletal:  Negative for back pain.   Skin:  Negative for rash.   Neurological:  Negative for weakness.   Hematological:  Does not bruise/bleed easily.     Physical Exam     Initial Vitals [10/21/22 1559]   BP Pulse Resp Temp SpO2   (!) 96/58 97 18 98 °F (36.7 °C) 100 %      MAP       --         Physical Exam    Nursing note and vitals reviewed.  Constitutional: He appears well-developed and well-nourished. He is not diaphoretic. No distress.   HENT:   Head: Normocephalic and atraumatic.   Mouth/Throat: Oropharynx is clear and moist.   Eyes: EOM are normal. Pupils are equal, round, and reactive to light.   Neck: Neck supple.   Normal range of motion.  Cardiovascular:  Normal rate, regular rhythm, normal heart sounds and intact distal pulses.     Exam reveals no gallop and no friction rub.       No murmur heard.  Pulmonary/Chest: He has wheezes. He has no rhonchi. He has rales.   Abdominal: Abdomen is soft. Bowel sounds are normal. He exhibits distension. There is no abdominal tenderness. A hernia is present. Hernia confirmed positive in the umbilical area. There is no rebound and no guarding.   Genitourinary:    Genitourinary Comments:  exam performed with nursing chaperone present. Patient with edema to the bilateral testicle. There is no overlying skin changes, erythema, palpable deformity, fluctuance, crepitus. There is no tenderness to palpation.      Musculoskeletal:         General: Edema present. Normal range of motion.      Cervical back: Normal range of motion and neck supple.     Neurological: He is alert and oriented to person, place, and time. He has normal strength. GCS score is 15. GCS eye subscore is 4. GCS verbal subscore is 5. GCS motor subscore is 6.   Skin: Skin is warm and dry. Capillary refill takes less than 2 seconds.   Psychiatric: He has a normal mood and affect. His  behavior is normal. Judgment and thought content normal.       ED Course   Procedures  Labs Reviewed   CBC W/ AUTO DIFFERENTIAL - Abnormal; Notable for the following components:       Result Value    WBC 3.70 (*)     RBC 2.94 (*)     Hemoglobin 8.6 (*)     Hematocrit 27.3 (*)     MCHC 31.5 (*)     RDW 22.7 (*)     Platelets 139 (*)     Lymph # 0.7 (*)     nRBC 1 (*)     All other components within normal limits   COMPREHENSIVE METABOLIC PANEL - Abnormal; Notable for the following components:    Potassium 2.9 (*)     CO2 30 (*)     Creatinine 3.7 (*)     Albumin 3.0 (*)     ALT 8 (*)     eGFR 16.9 (*)     All other components within normal limits   C-REACTIVE PROTEIN - Abnormal; Notable for the following components:    CRP 14.7 (*)     All other components within normal limits   LACTIC ACID, PLASMA - Abnormal; Notable for the following components:    Lactate (Lactic Acid) 2.8 (*)     All other components within normal limits   ISTAT PROCEDURE - Abnormal; Notable for the following components:    POC Creatinine 3.8 (*)     POC Potassium 3.0 (*)     POC Chloride 93 (*)     POC TCO2 (MEASURED) 33 (*)     POC Ionized Calcium 0.94 (*)     POC Hematocrit 33 (*)     All other components within normal limits   URINALYSIS, REFLEX TO URINE CULTURE   SARS-COV-2 RNA AMPLIFICATION, QUAL   ISTAT CHEM8     EKG Readings: (Independently Interpreted)   Initial Reading: No STEMI. Rhythm: Atrial Fibrillation. Heart Rate: 93.     Imaging Results               CTA Chest Abdomen Pelvis (Final result)  Result time 10/21/22 21:05:00      Final result by Surya Barragan MD (10/21/22 21:05:00)                   Impression:      Juan Carlos A aortic dissection extending to the right common iliac and left proximal internal iliac artery with significant aneurysmal dilatation of the thoracic aorta, as further outlined above.  Dissection flap involves the proximal right subclavian artery and also left renal artery.    Cardiomegaly with right heart  strain.  Additional findings suggesting stigmata of cardiac dysfunction and 3rd spacing including small bilateral pleural effusions, bilateral nonspecific pulmonary mosaic attenuation suggesting pulmonary edema, ascites, mesenteric edema and diffuse body wall edema/anasarca.    CT findings suggesting hepatic congestion, with superimposed steatosis not excluded.  Clinical correlation and with LFTs advised.    Right basilar compressive atelectasis.    Diverticulosis coli.    Left axillary mild lymphadenopathy, nonspecific.    Additional findings as above.    This report was flagged in Epic as abnormal.    COMMUNICATION  This critical result was discovered/received at 19:45.  The critical information above was relayed directly by Umer Yousif MD by telephone to Niecy Romero PA-C at 19:48.    Electronically signed by resident: Umer Yousif  Date:    10/21/2022  Time:    19:52    Electronically signed by: Surya Barragan MD  Date:    10/21/2022  Time:    21:05               Narrative:    EXAMINATION:  CTA CHEST ABDOMEN PELVIS    CLINICAL HISTORY:  Aortic aneurysm, known or suspected;    TECHNIQUE:  Axial CT angiogram images of the chest, abdomen, and pelvis were obtained before and after the administration of 100 cc of  Omnipaque 350 IV, from the lung apices through the ischial tuberosities, per aortic dissection protocol. Coronal and sagittal reconstructions of the abdominal aorta and visceral arteries performed.    COMPARISON:  Chest radiograph 10/21/2022 and 11/3/2017    FINDINGS:  Vascular:    There is extensive moderate to advanced calcific atherosclerosis of the thoracic and abdominal extending into its bilateral iliac branches in the pelvis.  Juan Carlos type A dissection (axial series 5 image 176) involving the ascending aorta beginning at the aortic root, extending through the arch to involve the proximal right subclavian artery (axial series 5 image 151), and caudally along the descending thoracic and  entire abdominal aorta, left renal artery (axial series 4, image 582), the common iliac on the right to the level of the bifurcation (axial series 4, image 735) and the proximal internal iliac artery on the left (axial series 4, image 759).  No convincing evidence of previous aortic dissection repair.  The true lumen is significantly narrowed at the level of the descending thoracic aorta and starts to equalize at the infrarenal aorta.  The celiac, SMA, ELIOT and right renal artery arise from the true lumen and remain widely patent.  The right subclavian artery and left renal artery also remain patent at this time.  The size of the aneurysm appears slightly increased on 10/21/2022 chest radiograph in comparison to 2017.  The dissection lumen measure up to 7.5 cm on the transversely (axial series 5, image 150).  No convincing evidence of contrast extravasation, adjacent inflammation to suggest aortic rupture at this time.  The IVC is significantly dilated measuring 5.1 cm (axial series 7 image 656).  Left central venous catheter with tip in the SVC.    Bolus timing is suboptimal for the evaluation of pulmonary thromboembolism noting there is weak opacification of the pulmonary arteries.  Allowing for this, no large central pulmonary arterial filling defect, and no definite filling defect to the level of the proximal lobar arteries.    Chest, abdomen and pelvis:    Soft tissue structures of the base of the neck.  Left chest wall pacer with the tip in the right atrium.  Small lipoma at the anterior aspect of the right latissimus dorsi muscle.  Extensive collateral vessel seen throughout the imaged lower neck and left greater than right chest and partially imaged left upper extremity likely related to cardiac dysfunction as well as significantly reduced aortic caliber of the true lumen within the descending thoracic aorta secondary to significant mass effect from large false lumen of the aneurysm.    The trachea is  midline, the proximal airways are patent and the lungs are symmetrically expanded.  Dependent bilateral ground-glass opacities, likely compression atelectasis from adjacent bilateral pleural effusion more on the right.  Mild patchy nonspecific pulmonary mosaic attenuation likely sequela of small vessels disease including pulmonary edema versus less likely small airways disease.  No pneumothorax.    The heart significantly enlarged heart with dilated right ventricle and flattening of the interventricular septum, suggesting right heart strain.  There are coronary artery calcifications.  No cardiac thrombus seen.    The esophagus maintains a normal course and caliber.    Few prominent and also mildly enlarged lymph nodes at the left axilla measuring up to 11 mm in maximum short axis, nonspecific.  No significant right axillary, mediastinal, or hilar lymphadenopathy.    The liver is normal in size with diffuse decreased attenuations and nutmug appearance suggesting hepatic congestion.  No focal hepatic abnormality.  There is no intra-or extrahepatic biliary ductal dilatation. The gallbladder, stomach, spleen, pancreas, and adrenal glands are unremarkable.    Left kidney is small in size.  Normal enhancement of the kidneys.  Left superior pole 1.3 cm hypodense renal lesion, likely renal cyst. No evidence of hydronephrosis.  The ureters appear normal in course and caliber without evidence of ureteral dilatation. Diffuse bladder wall thickening, likely due to nondistention.    The visualized loops of small and large bowel show no evidence of obstruction or inflammation.  Scattered colonic diverticula without definite acute diverticulitis.  No pneumatosis or portal venous gas.  Extensive free fluid in the abdomen and pelvis with diffuse mesenteric edema.  No free air.  Small ascites containing umbilical hernia.    Osseous structures demonstrate mild degenerative change.  Mild diastasis recti.  There is diffuse body wall  edema consistent with anasarca.                                        X-Ray Chest AP Portable (Final result)  Result time 10/21/22 18:55:43      Final result by Eliot Estrada DO (10/21/22 18:55:43)                   Impression:      Large thoracic aortic aneurysm.  Further evaluation with CTA chest is recommended if clinically indicated.    Small bilateral pleural effusions.    Cardiomegaly.    This report was flagged in Epic as abnormal.    Dr. Estrada discussed critical findings with THIERNO Romero by telephone at 18:50 on 10/21/2022.      Electronically signed by: Eliot Estrada  Date:    10/21/2022  Time:    18:55               Narrative:    EXAMINATION:  XR CHEST AP PORTABLE    CLINICAL HISTORY:  Shortness of breath    TECHNIQUE:  Single frontal view of the chest was performed.    COMPARISON:  11/03/2017.  Correlation is also made to report of chest radiograph from 09/18/2022.    FINDINGS:  There is a large aneurysm of the aortic arch measuring up to approximately 9 cm.  Previously on chest x-ray from 11/03/2017 this measures approximately 6.5 cm.  On chest x-ray report (images not available at this time) from 09/18/2022, the aneurysm was described as measuring 8.5 cm.  There are small bilateral pleural effusions.  There is mild interstitial prominence.  There is a nodular opacity overlying the right lower lung, likely nipple shadow, a symmetric opacity is also seen on the left, overlying the ribs.  Surgical clips are noted in the right shoulder region.  There are median sternotomy wires.  The cardiac silhouette is enlarged, unchanged.  The visualized osseous structures are intact.                                    X-Rays:   Independently Interpreted Readings:   Chest X-Ray: Cardiomegaly present.  Increased vascular markings consistent with CHF are present. Thoracic aortic aneurysm - increased in size from prior.    Medications   0.9%  NaCl infusion ( Intravenous Not Given 10/21/22 2045)   iohexoL (OMNIPAQUE 350)  injection 100 mL (100 mLs Intravenous Given 10/21/22 1924)   sodium chloride 0.9% bolus 500 mL (0 mLs Intravenous Stopped 10/21/22 2150)     Medical Decision Making:   History:   Old Medical Records: I decided to obtain old medical records.  Initial Assessment:   Emergent evaluation of a 67-year-old male who presents to the emergency department with chief complaint of hypotension.  He was sent from dialysis for emergent evaluation.  He did not receive dialysis today.  He complains of shortness of breath, lower extremity edema, testicular swelling.  Patient is afebrile, hemodynamically stable, nontoxic appearing.  Will order labs, chest x-ray, EKG, UA.  Differential Diagnosis:   Differential diagnosis includes but is not limited to volume overload, metabolic derangement, hyperkalemia, urinary tract infection.  Independently Interpreted Test(s):   I have ordered and independently interpreted X-rays - see prior notes.  I have ordered and independently interpreted EKG Reading(s) - see prior notes  Clinical Tests:   Lab Tests: Ordered and Reviewed  Radiological Study: Ordered and Reviewed  Medical Tests: Ordered and Reviewed  ED Management:  Stable anemia. Hgb 8.6.   Hypokalemia of 2.9. will not replete as patient is an HD patient.   CRP of 14.7.   CXR with increase in size of thoracic aortic aneurysm. Cardiomegaly with bilateral pleural effusions.   Patient was initially hypotensive, but this has improved without intervention. He denies chest pain, abdominal pain, back pain.   Will proceed with CTA chest abdomen pelvis. Will consult nephrology for recommends of timing of HD with contrast administration.     8:00 PM Received call from radiology that CTA is concerning for acute type 1 aortic dissection. Will consult CT surgery.   8:22 PM Discussed case with CT surgery who will evaluate the patient.     CT surgery does not feel that this is an acute dissection. They do not feel that he is a surgical candidate.   I have  discussed the case with Critical Care Medicine. They have also evaluated the patient and do not think he needs ICU level of care. Will admit to Medicine for further workup and treatment. Discussed with patient. All questions answered.   The patient's history, physical exam, and plan of care was discussed with and agreed upon with my supervising physician.         Critical Care   Date: 10/22/2022  Performed by: Oren Mack MD   Authorized by: Oren Mack MD    Total critical care time (exclusive of procedural time) : 55 minutes  Critical care was necessary to treat or prevent imminent or life-threatening deterioration of the following conditions:  Aortic dissection             Attending Attestation:     Physician Attestation Statement for NP/PA:   I have conducted a face to face encounter with this patient in addition to the NP/PA, due to Medical Complexity    Other NP/PA Attestation Additions:      Medical Decision Making: Thoracic aortic dissection, not a surgical candidate, BP controlled suitably in ED, ICU recommends floor, HM to admit.             ED Course as of 10/21/22 2217   Fri Oct 21, 2022   1812 WBC(!): 3.70 [JM]   1812 Hemoglobin(!): 8.6 [JM]   1812 Hematocrit(!): 27.3 [JM]   1812 Platelets(!): 139 [JM]   2001 X-Ray Chest AP Portable(!)  Enlarging aortic knob per my independent interpretation.     [DC]      ED Course User Index  [DC] Oren Mack MD  [JM] Niecy Romero PA-C                 Clinical Impression:   Final diagnoses:  [R06.02] Shortness of breath  [I95.9] Hypotension, unspecified hypotension type (Primary)  [I71.21] Ascending aortic aneurysm, unspecified whether ruptured      ED Disposition Condition    Admit Stable                Niecy Romero PA-C  10/21/22 2217       Oren Mack MD  10/22/22 3281

## 2022-10-21 NOTE — ED NOTES
I-STAT Chem-8+ Results:   Value Reference Range   Sodium 140 136-145 mmol/L   Potassium  3.0 3.5-5.1 mmol/L   Chloride 93  mmol/L   Ionized Calcium 0.94 1.06-1.42 mmol/L   CO2 (measured) 33 23-29 mmol/L   Glucose 88  mg/dL   BUN 10 6-30 mg/dL   Creatinine 3.8 0.5-1.4 mg/dL   Hematocrit 33 36-54%

## 2022-10-21 NOTE — ED NOTES
Pt presents to ED w/ c/o of hypotension at dialysis clinic. Pt goes to dialysis MWF; states he went today and was told to come to ER due to low BP. Pt endorses shortness of breath during exertion. Pt has swelling in lower abdomen, lower extremities, and testicles. Endorses testicular pain + pain on palpation. Received dialysis on Wednesday.

## 2022-10-22 PROBLEM — I50.23 ACUTE ON CHRONIC SYSTOLIC HEART FAILURE: Status: ACTIVE | Noted: 2022-01-01

## 2022-10-22 PROBLEM — I48.0 PAROXYSMAL ATRIAL FIBRILLATION: Status: ACTIVE | Noted: 2022-01-01

## 2022-10-22 NOTE — SUBJECTIVE & OBJECTIVE
Past Medical History:   Diagnosis Date    Aortic dissection     [place] Type A     Cardiac defibrillator in place     CHF (congestive heart failure) 12/15/14    EF 15-20%    Coronary artery disease     ETOH abuse     Hepatitis C antibody test positive     Hypertension     S/P aortic dissection repair 2011       Past Surgical History:   Procedure Laterality Date    CORONARY ARTERY BYPASS GRAFT         Review of patient's allergies indicates:  No Known Allergies    No current facility-administered medications on file prior to encounter.     Current Outpatient Medications on File Prior to Encounter   Medication Sig    carvedilol (COREG) 3.125 MG tablet Take 1 tablet (3.125 mg total) by mouth 2 (two) times daily.    furosemide (LASIX) 40 MG tablet Take one pill twice a day. Take one additional dose if weight increases more than 2 lbs in one day.    lisinopril (PRINIVIL,ZESTRIL) 2.5 MG tablet Take 1 tablet (2.5 mg total) by mouth once daily.    pravastatin (PRAVACHOL) 40 MG tablet Take 1 tablet (40 mg total) by mouth once daily.    spironolactone (ALDACTONE) 25 MG tablet Take 1 tablet (25 mg total) by mouth once daily.     Family History    None       Tobacco Use    Smoking status: Every Day    Smokeless tobacco: Never   Substance and Sexual Activity    Alcohol use: Yes     Alcohol/week: 0.0 standard drinks     Types: 1 - 2 drink(s) per week    Drug use: Not on file    Sexual activity: Not on file     Review of Systems   Constitutional: Negative for chills and fever.   Cardiovascular:  Positive for dyspnea on exertion and orthopnea. Negative for chest pain, near-syncope, palpitations and syncope.   Respiratory:  Positive for shortness of breath.    Endocrine: Negative.    Gastrointestinal: Negative.    Genitourinary: Negative.    Neurological: Negative.    Psychiatric/Behavioral: Negative.     Objective:     Vital Signs (Most Recent):  Temp: 97.7 °F (36.5 °C) (10/22/22 1140)  Pulse: 101 (10/22/22  1043)  Resp: (!) 26 (10/22/22 1043)  BP: 101/74 (10/22/22 1145)  SpO2: 100 % (10/22/22 1043)   Vital Signs (24h Range):  Temp:  [97.2 °F (36.2 °C)-98.2 °F (36.8 °C)] 97.7 °F (36.5 °C)  Pulse:  [] 101  Resp:  [16-26] 26  SpO2:  [94 %-100 %] 100 %  BP: ()/(58-75) 101/74     Weight: 74 kg (163 lb 2.3 oz)  Body mass index is 25.55 kg/m².    SpO2: 100 %  O2 Device (Oxygen Therapy): nasal cannula      Intake/Output Summary (Last 24 hours) at 10/22/2022 1229  Last data filed at 10/22/2022 0950  Gross per 24 hour   Intake --   Output 30 ml   Net -30 ml       Lines/Drains/Airways       Central Venous Catheter Line  Duration                  Hemodialysis Catheter right femoral -- days              Peripheral Intravenous Line  Duration                  Peripheral IV - Single Lumen 10/21/22 1827 20 G Left Antecubital <1 day                    Physical Exam  Constitutional:       General: He is not in acute distress.     Appearance: Normal appearance. He is normal weight. He is not ill-appearing.   HENT:      Head: Normocephalic.      Mouth/Throat:      Mouth: Mucous membranes are moist.   Eyes:      Extraocular Movements: Extraocular movements intact.   Neck:      Vascular: JVD present.   Cardiovascular:      Rate and Rhythm: Normal rate and regular rhythm.      Pulses: Normal pulses.   Pulmonary:      Effort: Pulmonary effort is normal.      Comments: Decreased breath sounds bilaterally  Abdominal:      General: Abdomen is flat. Bowel sounds are normal.   Musculoskeletal:         General: No swelling (2+ bilateral pitting edema). Normal range of motion.      Cervical back: Normal range of motion.   Skin:     General: Skin is warm.      Coloration: Skin is not jaundiced.   Neurological:      Mental Status: He is alert and oriented to person, place, and time. Mental status is at baseline.   Psychiatric:         Mood and Affect: Mood normal.         Behavior: Behavior normal.         Thought Content: Thought content  normal.       Significant Labs: BMP:   Recent Labs   Lab 10/21/22  1758 10/22/22  0545   GLU 84 67*    141   K 2.9* 3.2*   CL 95 96   CO2 30* 30*   BUN 10 12   CREATININE 3.7* 3.6*   CALCIUM 9.2 9.0   MG  --  1.9   , CMP   Recent Labs   Lab 10/21/22  1758 10/22/22  0545    141   K 2.9* 3.2*   CL 95 96   CO2 30* 30*   GLU 84 67*   BUN 10 12   CREATININE 3.7* 3.6*   CALCIUM 9.2 9.0   PROT 7.5 7.3   ALBUMIN 3.0* 2.9*   BILITOT 1.0 1.5*   ALKPHOS 77 78   AST 25 22   ALT 8* 6*   ANIONGAP 14 15   , CBC   Recent Labs   Lab 10/21/22  1758 10/21/22  1804 10/22/22  0545   WBC 3.70*  --  3.61*   HGB 8.6*  --  8.5*   HCT 27.3*   < > 27.5*   *  --  137*    < > = values in this interval not displayed.   , INR No results for input(s): INR, PROTIME in the last 48 hours., and Troponin No results for input(s): TROPONINI in the last 48 hours.    Significant Imaging:   Imaging Results               CTA Chest Abdomen Pelvis (Final result)  Result time 10/21/22 21:05:00      Final result by Surya Barragan MD (10/21/22 21:05:00)                   Impression:      Juan Carlos A aortic dissection extending to the right common iliac and left proximal internal iliac artery with significant aneurysmal dilatation of the thoracic aorta, as further outlined above.  Dissection flap involves the proximal right subclavian artery and also left renal artery.    Cardiomegaly with right heart strain.  Additional findings suggesting stigmata of cardiac dysfunction and 3rd spacing including small bilateral pleural effusions, bilateral nonspecific pulmonary mosaic attenuation suggesting pulmonary edema, ascites, mesenteric edema and diffuse body wall edema/anasarca.    CT findings suggesting hepatic congestion, with superimposed steatosis not excluded.  Clinical correlation and with LFTs advised.    Right basilar compressive atelectasis.    Diverticulosis coli.    Left axillary mild lymphadenopathy, nonspecific.    Additional findings as  above.    This report was flagged in Epic as abnormal.    COMMUNICATION  This critical result was discovered/received at 19:45.  The critical information above was relayed directly by Umer Yousif MD by telephone to Niecy Romero PA-C at 19:48.    Electronically signed by resident: Umer Yousif  Date:    10/21/2022  Time:    19:52    Electronically signed by: Surya Barragan MD  Date:    10/21/2022  Time:    21:05               Narrative:    EXAMINATION:  CTA CHEST ABDOMEN PELVIS    CLINICAL HISTORY:  Aortic aneurysm, known or suspected;    TECHNIQUE:  Axial CT angiogram images of the chest, abdomen, and pelvis were obtained before and after the administration of 100 cc of  Omnipaque 350 IV, from the lung apices through the ischial tuberosities, per aortic dissection protocol. Coronal and sagittal reconstructions of the abdominal aorta and visceral arteries performed.    COMPARISON:  Chest radiograph 10/21/2022 and 11/3/2017    FINDINGS:  Vascular:    There is extensive moderate to advanced calcific atherosclerosis of the thoracic and abdominal extending into its bilateral iliac branches in the pelvis.  Juan Carlos type A dissection (axial series 5 image 176) involving the ascending aorta beginning at the aortic root, extending through the arch to involve the proximal right subclavian artery (axial series 5 image 151), and caudally along the descending thoracic and entire abdominal aorta, left renal artery (axial series 4, image 582), the common iliac on the right to the level of the bifurcation (axial series 4, image 735) and the proximal internal iliac artery on the left (axial series 4, image 759).  No convincing evidence of previous aortic dissection repair.  The true lumen is significantly narrowed at the level of the descending thoracic aorta and starts to equalize at the infrarenal aorta.  The celiac, SMA, ELIOT and right renal artery arise from the true lumen and remain widely patent.  The right  subclavian artery and left renal artery also remain patent at this time.  The size of the aneurysm appears slightly increased on 10/21/2022 chest radiograph in comparison to 2017.  The dissection lumen measure up to 7.5 cm on the transversely (axial series 5, image 150).  No convincing evidence of contrast extravasation, adjacent inflammation to suggest aortic rupture at this time.  The IVC is significantly dilated measuring 5.1 cm (axial series 7 image 656).  Left central venous catheter with tip in the SVC.    Bolus timing is suboptimal for the evaluation of pulmonary thromboembolism noting there is weak opacification of the pulmonary arteries.  Allowing for this, no large central pulmonary arterial filling defect, and no definite filling defect to the level of the proximal lobar arteries.    Chest, abdomen and pelvis:    Soft tissue structures of the base of the neck.  Left chest wall pacer with the tip in the right atrium.  Small lipoma at the anterior aspect of the right latissimus dorsi muscle.  Extensive collateral vessel seen throughout the imaged lower neck and left greater than right chest and partially imaged left upper extremity likely related to cardiac dysfunction as well as significantly reduced aortic caliber of the true lumen within the descending thoracic aorta secondary to significant mass effect from large false lumen of the aneurysm.    The trachea is midline, the proximal airways are patent and the lungs are symmetrically expanded.  Dependent bilateral ground-glass opacities, likely compression atelectasis from adjacent bilateral pleural effusion more on the right.  Mild patchy nonspecific pulmonary mosaic attenuation likely sequela of small vessels disease including pulmonary edema versus less likely small airways disease.  No pneumothorax.    The heart significantly enlarged heart with dilated right ventricle and flattening of the interventricular septum, suggesting right heart strain.  There  are coronary artery calcifications.  No cardiac thrombus seen.    The esophagus maintains a normal course and caliber.    Few prominent and also mildly enlarged lymph nodes at the left axilla measuring up to 11 mm in maximum short axis, nonspecific.  No significant right axillary, mediastinal, or hilar lymphadenopathy.    The liver is normal in size with diffuse decreased attenuations and nutmug appearance suggesting hepatic congestion.  No focal hepatic abnormality.  There is no intra-or extrahepatic biliary ductal dilatation. The gallbladder, stomach, spleen, pancreas, and adrenal glands are unremarkable.    Left kidney is small in size.  Normal enhancement of the kidneys.  Left superior pole 1.3 cm hypodense renal lesion, likely renal cyst. No evidence of hydronephrosis.  The ureters appear normal in course and caliber without evidence of ureteral dilatation. Diffuse bladder wall thickening, likely due to nondistention.    The visualized loops of small and large bowel show no evidence of obstruction or inflammation.  Scattered colonic diverticula without definite acute diverticulitis.  No pneumatosis or portal venous gas.  Extensive free fluid in the abdomen and pelvis with diffuse mesenteric edema.  No free air.  Small ascites containing umbilical hernia.    Osseous structures demonstrate mild degenerative change.  Mild diastasis recti.  There is diffuse body wall edema consistent with anasarca.                                        X-Ray Chest AP Portable (Final result)  Result time 10/21/22 18:55:43      Final result by Eliot Estrada DO (10/21/22 18:55:43)                   Impression:      Large thoracic aortic aneurysm.  Further evaluation with CTA chest is recommended if clinically indicated.    Small bilateral pleural effusions.    Cardiomegaly.    This report was flagged in Epic as abnormal.    Dr. Estrada discussed critical findings with THIERNO Romero by telephone at 18:50 on  10/21/2022.      Electronically signed by: Eliot Estrada  Date:    10/21/2022  Time:    18:55               Narrative:    EXAMINATION:  XR CHEST AP PORTABLE    CLINICAL HISTORY:  Shortness of breath    TECHNIQUE:  Single frontal view of the chest was performed.    COMPARISON:  11/03/2017.  Correlation is also made to report of chest radiograph from 09/18/2022.    FINDINGS:  There is a large aneurysm of the aortic arch measuring up to approximately 9 cm.  Previously on chest x-ray from 11/03/2017 this measures approximately 6.5 cm.  On chest x-ray report (images not available at this time) from 09/18/2022, the aneurysm was described as measuring 8.5 cm.  There are small bilateral pleural effusions.  There is mild interstitial prominence.  There is a nodular opacity overlying the right lower lung, likely nipple shadow, a symmetric opacity is also seen on the left, overlying the ribs.  Surgical clips are noted in the right shoulder region.  There are median sternotomy wires.  The cardiac silhouette is enlarged, unchanged.  The visualized osseous structures are intact.

## 2022-10-22 NOTE — ASSESSMENT & PLAN NOTE
ESRD with access right groin  Admitted for hypotension and found to have enlarging thoracic aneurysm from previous    Of note patient with second chart    Recommendations  Ordered 500 cc of NS with contrast  Will place on dialysis HD tentatively pending results and if blood pressure tolerates, 2 hours no UF for metabolic clearance

## 2022-10-22 NOTE — CONSULTS
[facility] - Emergency Dept  Nephrology  Consult Note    Patient Name: Nayan Hernandez  MRN: [id number]  Admission Date: 10/21/2022  Hospital Length of Stay: 0 days  Attending Provider: Oren Mack MD   Primary Care Physician: Eusebia Brady NP  Principal Problem:<principal problem not specified>    Inpatient consult to Nephrology  Consult performed by: Stuart Persaud MD  Consult ordered by: Niecy Romero PA-C        Subjective:     HPI: 66 yo M PMHx of hepatitis C, CHF with 10%EF, CAD, moderate mitral valve regurgitation, moderate tricuspid valve regurgitation, HTN, HLD, pulmonary HTN, end-stage renal disease on HD MWF (last 5 months) who was admitted on 10/21 for hypotension at the dialsis clinic. CXR revealed an enlarging thoracic aneurysm from previous.      Nephrology consulted for ESRD with HD needs    ESRD on hemodialysis  Outpatient HD Information:  -Dialysis modality: Hemodialysis  -Outpatient HD unit: [facility]   -Nephrologist: Dr. Childs  -HD TX days: Monday/Friday, duration of treatment: unk  -Last HD TX prior to hospital admission: 09/29/2022  -Dialysis access: dialysis catheter placed on R femoral   -Residual urine: oliguric   -EDW: ?      Past Medical History:   Diagnosis Date    Aortic dissection     [place] Type A     Cardiac defibrillator in place     CHF (congestive heart failure) 12/15/14    EF 15-20%    Coronary artery disease     ETOH abuse     Hepatitis C antibody test positive     Hypertension     S/P aortic dissection repair 2011       Past Surgical History:   Procedure Laterality Date    CORONARY ARTERY BYPASS GRAFT         Review of patient's allergies indicates:  No Known Allergies  Current Facility-Administered Medications   Medication Frequency    sodium chloride 0.9% bolus 500 mL Once     Current Outpatient Medications   Medication    carvedilol (COREG) 3.125 MG tablet    furosemide (LASIX) 40 MG tablet    lisinopril (PRINIVIL,ZESTRIL) 2.5 MG tablet     pravastatin (PRAVACHOL) 40 MG tablet    spironolactone (ALDACTONE) 25 MG tablet     Family History    None       Tobacco Use    Smoking status: Every Day    Smokeless tobacco: Never   Substance and Sexual Activity    Alcohol use: Yes     Alcohol/week: 0.0 standard drinks     Types: 1 - 2 drink(s) per week    Drug use: Not on file    Sexual activity: Not on file     Review of Systems   Constitutional:  Positive for fatigue. Negative for chills and fever.   HENT:  Negative for rhinorrhea and sore throat.    Eyes:  Negative for pain and visual disturbance.   Respiratory:  Positive for shortness of breath. Negative for cough.    Cardiovascular:  Negative for chest pain and palpitations.   Gastrointestinal:  Negative for abdominal pain, constipation, diarrhea and nausea.   Endocrine: Negative for cold intolerance, heat intolerance and polyuria.   Genitourinary:  Negative for dysuria and flank pain.   Musculoskeletal:  Negative for back pain, gait problem and joint swelling.   Allergic/Immunologic: Negative for immunocompromised state.   Neurological:  Negative for light-headedness, numbness and headaches.   Objective:     Vital Signs (Most Recent):  Temp: 97.5 °F (36.4 °C) (10/21/22 1850)  Pulse: 98 (10/21/22 1850)  Resp: 18 (10/21/22 1850)  BP: 104/75 (10/21/22 1850)  SpO2: 98 % (10/21/22 1850)  O2 Device (Oxygen Therapy): nasal cannula (10/21/22 1850) Vital Signs (24h Range):  Temp:  [97.5 °F (36.4 °C)-98 °F (36.7 °C)] 97.5 °F (36.4 °C)  Pulse:  [] 98  Resp:  [18] 18  SpO2:  [98 %-100 %] 98 %  BP: ()/(58-75) 104/75     Weight: 69.9 kg (154 lb) (10/21/22 1559)  Body mass index is 24.12 kg/m².  Body surface area is 1.82 meters squared.    No intake/output data recorded.    Physical Exam  Constitutional:       General: He is not in acute distress.     Appearance: He is not ill-appearing or toxic-appearing.   HENT:      Head: Normocephalic.   Cardiovascular:      Heart sounds: Murmur heard.   Pulmonary:       Effort: Pulmonary effort is normal. No respiratory distress.      Breath sounds: Rales present. No wheezing.   Abdominal:      General: Abdomen is flat. There is no distension.      Palpations: There is no mass.      Tenderness: There is no abdominal tenderness.      Comments: Right permacath right groin   Musculoskeletal:         General: Normal range of motion.      Right lower leg: No edema.      Left lower leg: No edema.   Skin:     General: Skin is dry.   Neurological:      Mental Status: He is alert.       Significant Labs:  All labs within the past 24 hours have been reviewed.    Significant Imaging:  Labs: Reviewed    Assessment/Plan:     ESRD (end stage renal disease)  ESRD with access right groin  Admitted for hypotension and found to have enlarging thoracic aneurysm from previous    Of note patient with second chart    Recommendations  Ordered 500 cc of NS with contrast  Will place on dialysis HD tentatively pending results and if blood pressure tolerates, 2 hours no UF for metabolic clearance              Thank you for your consult. I will follow-up with patient. Please contact us if you have any additional questions.    Stuart Persaud MD  Nephrology  [facility] - Emergency Dept

## 2022-10-22 NOTE — ASSESSMENT & PLAN NOTE
Nayan Hernandez is a 64 yo M hx of  HCV, HFrEF ( EF 10% w/ MR + TR), pHTN, HTN, HLD, ESRD MWF (within the past month) with permacath in right groin, pAF however not on AC, chronic Type B dissection s/p intervention 2011, non-compliance (poor follow up, missed HD sessions) admitted for hypotension that has now improved with short course of IVF.     Recommendations  - Patient appears euvolemic on exam and was about to be initiated on HD at the time of my evaluation.   - Doesn't appear to be in cardiogenic shock at this time, however we'll follow up closely with repeat lactic acid and labs.   - Continue home midodrine TID  - Do not recommend dobutamine infusion for now. We'll monitor and see how he does following dialysis.

## 2022-10-22 NOTE — ASSESSMENT & PLAN NOTE
Patient presenting with volume overload, missed his dialysis on 10/21 due to becoming hypotensive and short of breath. Nephrology consulted with plans to initiate dialysis. Recommended 500 mL fluid bolus to see if can help with hypotension. Blood pressure improved with fluid bolus. Shortness of breath likely 2/2 to volume overload has he has significant distention of neck veins, decreased breath sounds in lower lungs and LE edema.    --Hold blood pressure medications.  --Given 500mL bolus in ED with good response  --Patient became hypotensive before initiating dialysis. Likely hypotensive 2/2 to volume overload, with hx of poor heart function (EF 10-15). Suspect hypotension will improve with volume removal.  --Midodrine 10mg TID  --Follow up repeat Lactic acid  --Obtain formal Echo  --not suspecting infection as trigger

## 2022-10-22 NOTE — PLAN OF CARE
Pt educated on fall risk, pt remained free from falls/trauma/injury. Pt is in NPO per order. SOB. Satting above 98% in 1 L O2.  BG monitored, 79 mg/dl @ 3 am. Plan of care reviewed with pt, all questions answered. Bed locked in lowest position, call bell within reach, no acute distress noted, will continue to monitor.

## 2022-10-22 NOTE — ASSESSMENT & PLAN NOTE
Platelets 139 today, stable compared to prior lab values.    Cbc daily  transfuse if less than 10

## 2022-10-22 NOTE — ASSESSMENT & PLAN NOTE
Hold home blood pressure medications given patient experiencing hypotension.      --Restart BP meds when safe and not hypotensive.

## 2022-10-22 NOTE — HPI
Mr Hernandez is a 62 yo M hx of  HCV, HFrEF 10%-15% w/ MR + TR, pHTN, HTN, HLD, ESRD MWF (last 5 months) with permacath in right groin, pAF not on AC, chronic Type B dissection s/p intervention 2011 presenting from HD clinic for hypotensive episode that caused him to not be able to complete his HD. Patient presented with BP 96/58, mentation intact. He notes that he has been experiencing shortness of breath, swelling of his scrotum, which is new for him. He says that he will be better once he gets the fluid off. He denies having any chest pain, abdominal pain, headaches, vision changes. He notes that he is still able to make urine however feels it is more difficult currently, as he has to strain to get urine out into canister. He has no fevers, chills, N/V/D. He is making stool appropriately.    ED course:  Patient presneted to ED with shortness of breath. CXR concerning widening of mediastinum. CTA obtained of C/A/P showing ascending aortic dissection.Cardiothoracic surgery consulted to evaluate patient for concern of aortic dissection, noted that patient had chronic ascending and descending dissection. Critical care consulted  or hypoxia who noted patient was stable for the floor after evaluation given patient with normal SpO2 on NC. Nephrology consulted for dialysis needs. Recommended 500 mL bolus for hypotension. Found to have Lactic acid 2.8. Patient admitted to hospital medicine for volume overload, hypotension, and dialysis needs.

## 2022-10-22 NOTE — HPI
Nayan Hernandez is a 64 yo M hx of  HCV, HFrEF ( EF 10% w/ MR + TR), pHTN, HTN, HLD, ESRD MWF (within the past month) with permacath in right groin, pAF however not on AC, chronic Type B dissection s/p intervention 2011 ( now deemed inoperable for repeat surgery) who was admitted for hypotension. The patient is a poor historian. The patient presented from HD clinic for hypotensive episode that caused him to not be able to complete his HD. He reports dyspnea, PND for the past few days, associated with chronic LE edema. He denies lightheadedness. Of note, the patient had an infected PPM lead that was treated with abx due to high risk of extraction. He is well known to palliative care and was considering home based palliative program. He currently doesn't follow with cardiologist in the clinic. He is on midodrine 10mg TID for chronic hypotension. He is off GDMT.     Upon arrival to the ED, his BP improved wit 100/70s with 500cc of IVF. A CTA C/A/P was obtained due to enlarging aortic aneurysm on CXR. The CTA C/A/P demonstrated aortic dissection extending to the right common iliac and left proximal internal iliac artery with significant aneurysmal dilatation of the thoracic aorta and dissection flap involves the proximal right subclavian artery and also left renal artery. CTS evalauted the patient and deemed him inoperable as these findings aren't a surgical indication at this time, as well as concern that a redo operation on his arch would likely be fatal. Also demeed him not a candidate for advanced options given co-morbidities.  Lactic was 2.8 on admit that increased to 3.3 and worsened to 5. He completed HD with 2.5L of volume removal. Cardiology was re consulted for concerning of worsening lactic acidosis. Upon my evaluation, he appeared worse than prior, reported dyspnea and generalized body discomfort and cool hands. The patient was transferred to CICU for closer monitoring.

## 2022-10-22 NOTE — PLAN OF CARE
Problem: Adult Inpatient Plan of Care  Goal: Plan of Care Review  Outcome: Ongoing, Progressing  Goal: Patient-Specific Goal (Individualized)  Outcome: Ongoing, Progressing  Goal: Optimal Comfort and Wellbeing  Outcome: Ongoing, Progressing     Problem: Impaired Wound Healing  Goal: Optimal Wound Healing  Outcome: Ongoing, Progressing     Problem: Skin Injury Risk Increased  Goal: Skin Health and Integrity  Outcome: Ongoing, Progressing     Problem: Device-Related Complication Risk (Hemodialysis)  Goal: Safe, Effective Therapy Delivery  Outcome: Ongoing, Progressing     Problem: Hemodynamic Instability (Hemodialysis)  Goal: Effective Tissue Perfusion  Outcome: Ongoing, Progressing     Problem: Infection (Hemodialysis)  Goal: Absence of Infection Signs and Symptoms  Outcome: Ongoing, Progressing     Problem: Infection  Goal: Absence of Infection Signs and Symptoms  Outcome: Ongoing, Progressing     Problem: Electrolyte Imbalance (Chronic Kidney Disease)  Goal: Electrolyte Balance  Outcome: Ongoing, Progressing    Plan of care discussed with patient. AAOx4. VSS. Neck veins distended. Dialysis cath clean, intact. 2L/NC .Patient ambulating independently, fall precautions in place. Patient has no complaints of pain. Discussed medications and care. Patient has no questions at this time. Will continue to monitor.

## 2022-10-22 NOTE — ASSESSMENT & PLAN NOTE
Patient presenting with shortness of breath today and low blood pressure. ED ordered CXR w/ concern for worsening aortic dissection. 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 Patient is also not a surgical candidate per CTS given his many comorbidites. Patient likely with shortness of breath from being volume overload.    --Follow up CTS recs

## 2022-10-22 NOTE — SUBJECTIVE & OBJECTIVE
Past Medical History:   Diagnosis Date    Aortic dissection     [place] Type A     Cardiac defibrillator in place     CHF (congestive heart failure) 12/15/14    EF 15-20%    Coronary artery disease     ETOH abuse     Hepatitis C antibody test positive     Hypertension     S/P aortic dissection repair 2011       Past Surgical History:   Procedure Laterality Date    CORONARY ARTERY BYPASS GRAFT         Review of patient's allergies indicates:  No Known Allergies    No current facility-administered medications on file prior to encounter.     Current Outpatient Medications on File Prior to Encounter   Medication Sig    carvedilol (COREG) 3.125 MG tablet Take 1 tablet (3.125 mg total) by mouth 2 (two) times daily.    furosemide (LASIX) 40 MG tablet Take one pill twice a day. Take one additional dose if weight increases more than 2 lbs in one day.    lisinopril (PRINIVIL,ZESTRIL) 2.5 MG tablet Take 1 tablet (2.5 mg total) by mouth once daily.    pravastatin (PRAVACHOL) 40 MG tablet Take 1 tablet (40 mg total) by mouth once daily.    spironolactone (ALDACTONE) 25 MG tablet Take 1 tablet (25 mg total) by mouth once daily.     Family History    None       Tobacco Use    Smoking status: Every Day    Smokeless tobacco: Never   Substance and Sexual Activity    Alcohol use: Yes     Alcohol/week: 0.0 standard drinks     Types: 1 - 2 drink(s) per week    Drug use: Not on file    Sexual activity: Not on file     Review of Systems   Constitutional:  Negative for chills, fatigue and fever.   HENT:  Negative for congestion and sore throat.    Eyes:  Negative for visual disturbance.   Respiratory:  Positive for shortness of breath. Negative for cough and wheezing.    Cardiovascular:  Positive for leg swelling. Negative for chest pain and palpitations.   Gastrointestinal:  Negative for abdominal pain, constipation, diarrhea, nausea and vomiting.   Endocrine: Negative for polyuria.   Genitourinary:  Positive for penile swelling and  scrotal swelling. Negative for dysuria, flank pain and frequency.   Musculoskeletal:  Negative for back pain and myalgias.   Skin:  Negative for pallor and rash.   Neurological:  Negative for light-headedness and headaches.   Psychiatric/Behavioral:  Negative for agitation and confusion.    Objective:     Vital Signs (Most Recent):  Temp: 97.4 °F (36.3 °C) (10/21/22 2002)  Pulse: 99 (10/21/22 2145)  Resp: 18 (10/21/22 2145)  BP: 106/71 (10/21/22 2145)  SpO2: 100 % (10/21/22 2145) Vital Signs (24h Range):  Temp:  [97.4 °F (36.3 °C)-98 °F (36.7 °C)] 97.4 °F (36.3 °C)  Pulse:  [] 99  Resp:  [18-20] 18  SpO2:  [98 %-100 %] 100 %  BP: ()/(58-75) 106/71     Weight: 69.9 kg (154 lb)  Body mass index is 24.12 kg/m².    Physical Exam  HENT:      Head: Normocephalic and atraumatic.      Comments: Engorged neck veins bilateral     Right Ear: External ear normal.      Left Ear: External ear normal.      Mouth/Throat:      Pharynx: Oropharynx is clear.   Eyes:      General:         Right eye: No discharge.         Left eye: No discharge.      Conjunctiva/sclera: Conjunctivae normal.   Cardiovascular:      Rate and Rhythm: Normal rate and regular rhythm.   Pulmonary:      Effort: Pulmonary effort is normal. No respiratory distress.      Breath sounds: No wheezing.      Comments: Decreased breath sounds in lower lung fields bilateral.  Abdominal:      General: Abdomen is flat.      Tenderness: There is no abdominal tenderness. There is no right CVA tenderness, left CVA tenderness or guarding.   Genitourinary:     Penis: Swelling present.       Comments: Scrotal edema, patient notes penis also more swollen. Not painful for patient.  Musculoskeletal:      Cervical back: Normal range of motion. No rigidity.      Right lower leg: Edema present.      Left lower leg: Edema present.   Skin:     General: Skin is warm and dry.      Coloration: Skin is not jaundiced.      Findings: No rash.   Neurological:      Mental Status: He  is alert and oriented to person, place, and time. Mental status is at baseline.   Psychiatric:         Attention and Perception: Attention normal.         Mood and Affect: Mood and affect normal.         Speech: Speech normal. Speech is not delayed or slurred.         Behavior: Behavior is agitated (wants fluid off).         Thought Content: Thought content normal.         Cognition and Memory: Cognition and memory normal.           Significant Labs: All pertinent labs within the past 24 hours have been reviewed.    Bilirubin:   Recent Labs   Lab 10/21/22  1758   BILITOT 1.0     BMP:   Recent Labs   Lab 10/21/22  1758   GLU 84      K 2.9*   CL 95   CO2 30*   BUN 10   CREATININE 3.7*   CALCIUM 9.2     CBC:   Recent Labs   Lab 10/21/22  1758 10/21/22  1804   WBC 3.70*  --    HGB 8.6*  --    HCT 27.3* 33*   *  --      CMP:   Recent Labs   Lab 10/21/22  1758      K 2.9*   CL 95   CO2 30*   GLU 84   BUN 10   CREATININE 3.7*   CALCIUM 9.2   PROT 7.5   ALBUMIN 3.0*   BILITOT 1.0   ALKPHOS 77   AST 25   ALT 8*   ANIONGAP 14     Lactic Acid:   Recent Labs   Lab 10/21/22  1954   LACTATE 2.8*         Significant Imaging: I have reviewed all pertinent imaging results/findings within the past 24 hours.

## 2022-10-22 NOTE — ASSESSMENT & PLAN NOTE
Patient with stable hemoglobin level at 8.6 today. No active signs of bleeding. Suspect 2/2 to ESRD.     --CBC daily  --Transfuse for Hgb less than 7 or if active bleeding occurs.  --patient with history of iron deficiency anemia will repeat studies

## 2022-10-22 NOTE — CONSULTS
"[facility] - Emergency Dept  Critical Care Medicine  Consult Note    Patient Name: Nayan Hernandez  MRN: [id number]  Admission Date: 10/21/2022  Hospital Length of Stay: 0 days  Code Status: Prior  Attending Physician: Kelly Dugan MD   Primary Care Provider: Eusebia Brady NP   Principal Problem: <principal problem not specified>    Inpatient consult to Critical Care Medicine  Consult performed by: Cole Cerda MD  Consult ordered by: Niecy Romero PA-C        Subjective:     HPI:  62 yo M PMHx HCV, HFrEF 10% w/ MR + TR, pHTN, HTN, HLD, ESRD MWF, pAF not on AC, chronic Type B dissection s/p intervention 2011 presenting from HD clinic for hypotensive episode that caused him to not be able to complete his HD. Patient presented with BP 96/58, mentation intact. Patient's primary complaint on arrival was his subjective feeling of volume overload, swollen scrotum, and BLE edema. Denies chest pain, dyspnea, abdominal pain, fevers, chills, nausea, vomiting, diarrhea, constipation, dysuria. Patient states he makes some urine, but lately has had difficulty making urine because he feels "tight".     CXR demonstrated small bilateral pleural effusions, large thoracic aortic aneurysm. CTA demonstrated signs concerning for Type A dissection, RH strain, pulmonary edema. Vascular consulted in ED, report that these findings are chronic and w/ previous intervention in 2011; no current surgical intervention plans. Nephrology consulted for HD, recommended 500 cc IVF bolus and evaluation for HD. MICU consulted for hypotension. Patient responsive to fluids on initial evaluation.       Hospital/ICU Course:  No notes on file    Past Medical History:   Diagnosis Date    Aortic dissection     [place] Type A     Cardiac defibrillator in place     CHF (congestive heart failure) 12/15/14    EF 15-20%    Coronary artery disease     ETOH abuse     Hepatitis C antibody test positive     Hypertension     S/P aortic dissection " repair 2011       Past Surgical History:   Procedure Laterality Date    CORONARY ARTERY BYPASS GRAFT         Review of patient's allergies indicates:  No Known Allergies    Family History    None       Tobacco Use    Smoking status: Every Day    Smokeless tobacco: Never   Substance and Sexual Activity    Alcohol use: Yes     Alcohol/week: 0.0 standard drinks     Types: 1 - 2 drink(s) per week    Drug use: Not on file    Sexual activity: Not on file      Review of Systems   Constitutional:  Negative for chills, fatigue and fever.   HENT:  Negative for congestion and sore throat.    Eyes:  Negative for visual disturbance.   Respiratory:  Negative for cough, shortness of breath and wheezing.    Cardiovascular:  Positive for leg swelling. Negative for chest pain and palpitations.   Gastrointestinal:  Negative for abdominal pain, diarrhea, nausea and vomiting.   Endocrine: Negative for polyuria.   Genitourinary:  Positive for penile swelling and scrotal swelling. Negative for dysuria, flank pain and frequency.   Musculoskeletal:  Negative for arthralgias, back pain and myalgias.   Skin:  Negative for pallor and rash.   Neurological:  Negative for light-headedness and headaches.   Objective:     Vital Signs (Most Recent):  Temp: 97.4 °F (36.3 °C) (10/21/22 2002)  Pulse: 91 (10/21/22 2002)  Resp: 20 (10/21/22 2002)  BP: 114/74 (10/21/22 2002)  SpO2: 100 % (10/21/22 2002) Vital Signs (24h Range):  Temp:  [97.4 °F (36.3 °C)-98 °F (36.7 °C)] 97.4 °F (36.3 °C)  Pulse:  [] 91  Resp:  [18-20] 20  SpO2:  [98 %-100 %] 100 %  BP: ()/(58-75) 114/74   Weight: 69.9 kg (154 lb)  Body mass index is 24.12 kg/m².    No intake or output data in the 24 hours ending 10/21/22 2225    Physical Exam  Vitals and nursing note reviewed.   Constitutional:       General: He is not in acute distress.     Appearance: He is not toxic-appearing.   HENT:      Head: Normocephalic and atraumatic.      Mouth/Throat:      Mouth: Mucous  membranes are moist.      Pharynx: No oropharyngeal exudate.   Eyes:      Extraocular Movements: Extraocular movements intact.      Pupils: Pupils are equal, round, and reactive to light.   Cardiovascular:      Rate and Rhythm: Normal rate and regular rhythm.      Heart sounds: No murmur heard.  Pulmonary:      Effort: Pulmonary effort is normal.      Breath sounds: No wheezing or rales.   Abdominal:      General: Abdomen is flat. Bowel sounds are normal. There is no distension.      Tenderness: There is no abdominal tenderness. There is no guarding.   Genitourinary:     Comments: Scrotal swelling  Musculoskeletal:         General: No swelling or tenderness. Normal range of motion.      Cervical back: Normal range of motion.      Right lower leg: Edema (2+ pitting edma) present.      Left lower leg: Edema (2+ pitting edema) present.   Lymphadenopathy:      Cervical: No cervical adenopathy.   Skin:     General: Skin is warm.      Coloration: Skin is not jaundiced.      Findings: No bruising or rash.   Neurological:      General: No focal deficit present.      Mental Status: He is alert and oriented to person, place, and time.      Cranial Nerves: No cranial nerve deficit.       Vents:     Lines/Drains/Airways       Peripheral Intravenous Line  Duration                  Peripheral IV - Single Lumen 10/21/22 1827 20 G Left Antecubital <1 day                  Significant Labs:    CBC/Anemia Profile:  Recent Labs   Lab 10/21/22  1758 10/21/22  1804   WBC 3.70*  --    HGB 8.6*  --    HCT 27.3* 33*   *  --    MCV 93  --    RDW 22.7*  --         Chemistries:  Recent Labs   Lab 10/21/22  1758      K 2.9*   CL 95   CO2 30*   BUN 10   CREATININE 3.7*   CALCIUM 9.2   ALBUMIN 3.0*   PROT 7.5   BILITOT 1.0   ALKPHOS 77   ALT 8*   AST 25       All pertinent labs within the past 24 hours have been reviewed.    Significant Imaging: I have reviewed all pertinent imaging results/findings within the past 24  hours.      ABG  No results for input(s): PH, PO2, PCO2, HCO3, BE in the last 168 hours.  Assessment/Plan:     Cardiac/Vascular  Hypotension  Chronic aortic dissection     67M with ESRD on MWF HD, HFrEF (10%), HTN, HLD, pAF not on AC, and an ascending aortic dissection s/p repair with known chronic type B dissection who reportedly presented with hypotension during dialysis today. Per patient he was brought to the ED after not completing HD, presumably for hypotension (no HD clinic records, patient does not recall low BP). He denies any infectious symptoms and only complains of dyspnea, lower ext edema, and scrotal swelling.     On arrival patient afebrile and hemodynamically stable with BP as low as 98/58. On 3L NC. Nephrology consulted, who recommended 500cc IVF. CTS consulted over signs of aortic dissection on CTA chest & CXR, who believe CTA findings are chronic and not an active dissection. At the time of consultation, patient's /71 (83).     Recommendations:  - Patient hemodynamically stable on initial evaluation; 500 cc bolus hanging  - S/P 500cc IVF bolus, judicious fluids PRN s/o HFrEF and volume overload requiring HD  - Nephrology consulted for HD needs  - Recommend stable for admission to hospital medicine. Has no current indication for MICU admission given hemodynamic stability.    Renal/  ESRD (end stage renal disease)  -HD per nephrology, already following         Critical care was time spent personally by me on the following activities: development of treatment plan with patient or surrogate and bedside caregivers, discussions with consultants, evaluation of patient's response to treatment, examination of patient, ordering and performing treatments and interventions, ordering and review of laboratory studies, ordering and review of radiographic studies, pulse oximetry, re-evaluation of patient's condition. This critical care time did not overlap with that of any other provider or involve time  for any procedures.    Thank you for your consult. I will sign off. Please contact us if you have any additional questions.     Cole Cerda MD  Critical Care Medicine  [facility] - Emergency Dept

## 2022-10-22 NOTE — PROGRESS NOTES
Patient arrived to dialysis unit via bed. Maintenance dialysis(MWF, off day) started via right femoral catheter.

## 2022-10-22 NOTE — SUBJECTIVE & OBJECTIVE
Past Medical History:   Diagnosis Date    Aortic dissection     [place] Type A     Cardiac defibrillator in place     CHF (congestive heart failure) 12/15/14    EF 15-20%    Coronary artery disease     ETOH abuse     Hepatitis C antibody test positive     Hypertension     S/P aortic dissection repair 2011       Past Surgical History:   Procedure Laterality Date    CORONARY ARTERY BYPASS GRAFT         Review of patient's allergies indicates:  No Known Allergies  Current Facility-Administered Medications   Medication Frequency    sodium chloride 0.9% bolus 500 mL Once     Current Outpatient Medications   Medication    carvedilol (COREG) 3.125 MG tablet    furosemide (LASIX) 40 MG tablet    lisinopril (PRINIVIL,ZESTRIL) 2.5 MG tablet    pravastatin (PRAVACHOL) 40 MG tablet    spironolactone (ALDACTONE) 25 MG tablet     Family History    None       Tobacco Use    Smoking status: Every Day    Smokeless tobacco: Never   Substance and Sexual Activity    Alcohol use: Yes     Alcohol/week: 0.0 standard drinks     Types: 1 - 2 drink(s) per week    Drug use: Not on file    Sexual activity: Not on file     Review of Systems   Constitutional:  Positive for fatigue. Negative for chills and fever.   HENT:  Negative for rhinorrhea and sore throat.    Eyes:  Negative for pain and visual disturbance.   Respiratory:  Positive for shortness of breath. Negative for cough.    Cardiovascular:  Negative for chest pain and palpitations.   Gastrointestinal:  Negative for abdominal pain, constipation, diarrhea and nausea.   Endocrine: Negative for cold intolerance, heat intolerance and polyuria.   Genitourinary:  Negative for dysuria and flank pain.   Musculoskeletal:  Negative for back pain, gait problem and joint swelling.   Allergic/Immunologic: Negative for immunocompromised state.   Neurological:  Negative for light-headedness, numbness and headaches.   Objective:     Vital Signs (Most Recent):  Temp: 97.5 °F (36.4 °C) (10/21/22  1850)  Pulse: 98 (10/21/22 1850)  Resp: 18 (10/21/22 1850)  BP: 104/75 (10/21/22 1850)  SpO2: 98 % (10/21/22 1850)  O2 Device (Oxygen Therapy): nasal cannula (10/21/22 1850) Vital Signs (24h Range):  Temp:  [97.5 °F (36.4 °C)-98 °F (36.7 °C)] 97.5 °F (36.4 °C)  Pulse:  [] 98  Resp:  [18] 18  SpO2:  [98 %-100 %] 98 %  BP: ()/(58-75) 104/75     Weight: 69.9 kg (154 lb) (10/21/22 1559)  Body mass index is 24.12 kg/m².  Body surface area is 1.82 meters squared.    No intake/output data recorded.    Physical Exam  Constitutional:       General: He is not in acute distress.     Appearance: He is not ill-appearing or toxic-appearing.   HENT:      Head: Normocephalic.   Cardiovascular:      Heart sounds: Murmur heard.   Pulmonary:      Effort: Pulmonary effort is normal. No respiratory distress.      Breath sounds: Rales present. No wheezing.   Abdominal:      General: Abdomen is flat. There is no distension.      Palpations: There is no mass.      Tenderness: There is no abdominal tenderness.      Comments: Right permacath right groin   Musculoskeletal:         General: Normal range of motion.      Right lower leg: No edema.      Left lower leg: No edema.   Skin:     General: Skin is dry.   Neurological:      Mental Status: He is alert.       Significant Labs:  All labs within the past 24 hours have been reviewed.    Significant Imaging:  Labs: Reviewed

## 2022-10-22 NOTE — CONSULTS
Consult Note  Cardiothoracic Surgery    Inpatient consult to Cardiothoracic Surgery  Consult performed by: Ernesto Kennedy MD  Consult ordered by: Niecy Romero PA-C      SUBJECTIVE:     History of Present Illness:  Patient is a 67 y.o. male with ESRD on HD, CHF (LVEF 10%), HTN, HLD, pAF not on AC, ascending aortic dissection s/p repair in 2011 (appears to have had hemiarch replacement) and known chronic type B dissection who presents to the ED due to hypotension during dialysis today. Mildly hypotensive upon arrival to the ED which responded to volume. The patient reports chronic lower extremity edema. Denies chest or back pain.   Vitals are wnl. Labs unremarkable.   CXR showed widened mediastinum so CTA was obtained which shows chronic aortic dissection with flap extending from previous hemiarch anastomosis down to the iliacs.     Scheduled Meds:   sodium chloride 0.9%   Intravenous Once    sodium chloride 0.9%  500 mL Intravenous Once     Infusions/Drips:  PRN Meds:    Review of patient's allergies indicates:  No Known Allergies    Past Medical History:   Diagnosis Date    Aortic dissection     [place] Type A     Cardiac defibrillator in place     CHF (congestive heart failure) 12/15/14    EF 15-20%    Coronary artery disease     ETOH abuse     Hepatitis C antibody test positive     Hypertension     S/P aortic dissection repair 2011     Past Surgical History:   Procedure Laterality Date    CORONARY ARTERY BYPASS GRAFT       No family history on file.  Social History     Tobacco Use    Smoking status: Every Day    Smokeless tobacco: Never   Substance Use Topics    Alcohol use: Yes     Alcohol/week: 0.0 standard drinks     Types: 1 - 2 drink(s) per week        Review of Systems:  Review of Systems   Constitutional: Negative.    HENT: Negative.     Eyes: Negative.    Cardiovascular:  Positive for leg swelling. Negative for chest pain and palpitations.   Gastrointestinal: Negative.    Musculoskeletal: Negative.     Skin: Negative.    Neurological: Negative.    Endo/Heme/Allergies: Negative.    Psychiatric/Behavioral: Negative.        OBJECTIVE:     Vital Signs (Most Recent)  Temp: 97.4 °F (36.3 °C) (10/21/22 2002)  Pulse: 91 (10/21/22 2002)  Resp: 20 (10/21/22 2002)  BP: 114/74 (10/21/22 2002)  SpO2: 100 % (10/21/22 2002)    Admission Weight: 69.9 kg (154 lb) (10/21/22 1559)   Most Recent Weight: 69.9 kg (154 lb) (10/21/22 1559)    Vital Signs Range (Last 24H):  Temp:  [97.4 °F (36.3 °C)-98 °F (36.7 °C)]   Pulse:  []   Resp:  [18-20]   BP: ()/(58-75)   SpO2:  [98 %-100 %]     Physical Exam:  Physical Exam  Vitals and nursing note reviewed.   HENT:      Mouth/Throat:      Mouth: Mucous membranes are moist.   Eyes:      Extraocular Movements: Extraocular movements intact.      Pupils: Pupils are equal, round, and reactive to light.   Cardiovascular:      Rate and Rhythm: Normal rate and regular rhythm.   Pulmonary:      Effort: Pulmonary effort is normal.   Abdominal:      General: Abdomen is flat.      Palpations: Abdomen is soft.   Skin:     General: Skin is warm.   Neurological:      General: No focal deficit present.      Mental Status: He is alert.   Psychiatric:         Mood and Affect: Mood normal.         Behavior: Behavior normal.        Laboratory:  I have reviewed all pertinent lab results within the past 24 hours.    Diagnostic Results:  ECG: Reviewed  X-Ray: Reviewed  CT: Reviewed    ASSESSMENT/PLAN:     67 y.o. male with ESRD on HD, CHF (LVEF 10%), HTN, HLD, pAF not on AC, ascending aortic dissection s/p repair in 2011 (appears to have had hemiarch replacement) and known chronic type B dissection who presents to the ED due to hypotension during dialysis today    Known chronic dissection involving the aortic arch and descending aorta. He is not a surgical candidate given co-morbidities listed above.   Please call with any questions    Tung MD Brent  Cardiothoracic Surgery PGY6

## 2022-10-22 NOTE — H&P
[facility] - Emergency Dept  [facility] Medicine  History & Physical    Patient Name: Nayan Hernandez  MRN: [id number]  Patient Class: IP- Inpatient  Admission Date: 10/21/2022  Attending Physician: Kelly Dugan MD   Primary Care Provider: Eusebia Brady NP         Patient information was obtained from patient, past medical records and ER records.     Subjective:     Principal Problem:Hypotension    Chief Complaint:   Chief Complaint   Patient presents with    Hypotension     Patient presents from dialysis via EMS for further evaluation of low blood pressure. EMS reports that patient was unable to receive dialysis session secondary to hypotension and dialysis RN concern that right groin hemodialysis access site is infected. Last session was on Wednesday. Patient is A&ox4 and following commands.         HPI: Mr Hernandez is a 62 yo M hx of  HCV, HFrEF 10%-15% w/ MR + TR, pHTN, HTN, HLD, ESRD MWF (last 5 months) with permacath in right groin, pAF not on AC, chronic Type B dissection s/p intervention 2011 presenting from HD clinic for hypotensive episode that caused him to not be able to complete his HD. Patient presented with BP 96/58, mentation intact. He notes that he has been experiencing shortness of breath, swelling of his scrotum, which is new for him. He says that he will be better once he gets the fluid off. He denies having any chest pain, abdominal pain, headaches, vision changes. He notes that he is still able to make urine however feels it is more difficult currently, as he has to strain to get urine out into canister. He has no fevers, chills, N/V/D. He is making stool appropriately.    ED course:  Patient presneted to ED with shortness of breath. CXR concerning widening of mediastinum. CTA obtained of C/A/P showing ascending aortic dissection.Cardiothoracic surgery consulted to evaluate patient for concern of aortic dissection, noted that patient had chronic ascending and descending dissection.  Critical care consulted  or hypoxia who noted patient was stable for the floor after evaluation given patient with normal SpO2 on NC. Nephrology consulted for dialysis needs. Recommended 500 mL bolus for hypotension. Found to have Lactic acid 2.8. Patient admitted to hospital medicine for volume overload, hypotension, and dialysis needs.      Past Medical History:   Diagnosis Date    Aortic dissection     [place] Type A     Cardiac defibrillator in place     CHF (congestive heart failure) 12/15/14    EF 15-20%    Coronary artery disease     ETOH abuse     Hepatitis C antibody test positive     Hypertension     S/P aortic dissection repair 2011       Past Surgical History:   Procedure Laterality Date    CORONARY ARTERY BYPASS GRAFT         Review of patient's allergies indicates:  No Known Allergies    No current facility-administered medications on file prior to encounter.     Current Outpatient Medications on File Prior to Encounter   Medication Sig    carvedilol (COREG) 3.125 MG tablet Take 1 tablet (3.125 mg total) by mouth 2 (two) times daily.    furosemide (LASIX) 40 MG tablet Take one pill twice a day. Take one additional dose if weight increases more than 2 lbs in one day.    lisinopril (PRINIVIL,ZESTRIL) 2.5 MG tablet Take 1 tablet (2.5 mg total) by mouth once daily.    pravastatin (PRAVACHOL) 40 MG tablet Take 1 tablet (40 mg total) by mouth once daily.    spironolactone (ALDACTONE) 25 MG tablet Take 1 tablet (25 mg total) by mouth once daily.     Family History    None       Tobacco Use    Smoking status: Every Day    Smokeless tobacco: Never   Substance and Sexual Activity    Alcohol use: Yes     Alcohol/week: 0.0 standard drinks     Types: 1 - 2 drink(s) per week    Drug use: Not on file    Sexual activity: Not on file     Review of Systems   Constitutional:  Negative for chills, fatigue and fever.   HENT:  Negative for congestion and sore throat.    Eyes:  Negative for visual  disturbance.   Respiratory:  Positive for shortness of breath. Negative for cough and wheezing.    Cardiovascular:  Positive for leg swelling. Negative for chest pain and palpitations.   Gastrointestinal:  Negative for abdominal pain, constipation, diarrhea, nausea and vomiting.   Endocrine: Negative for polyuria.   Genitourinary:  Positive for penile swelling and scrotal swelling. Negative for dysuria, flank pain and frequency.   Musculoskeletal:  Negative for back pain and myalgias.   Skin:  Negative for pallor and rash.   Neurological:  Negative for light-headedness and headaches.   Psychiatric/Behavioral:  Negative for agitation and confusion.    Objective:     Vital Signs (Most Recent):  Temp: 97.4 °F (36.3 °C) (10/21/22 2002)  Pulse: 99 (10/21/22 2145)  Resp: 18 (10/21/22 2145)  BP: 106/71 (10/21/22 2145)  SpO2: 100 % (10/21/22 2145) Vital Signs (24h Range):  Temp:  [97.4 °F (36.3 °C)-98 °F (36.7 °C)] 97.4 °F (36.3 °C)  Pulse:  [] 99  Resp:  [18-20] 18  SpO2:  [98 %-100 %] 100 %  BP: ()/(58-75) 106/71     Weight: 69.9 kg (154 lb)  Body mass index is 24.12 kg/m².    Physical Exam  HENT:      Head: Normocephalic and atraumatic.      Comments: Engorged neck veins bilateral     Right Ear: External ear normal.      Left Ear: External ear normal.      Mouth/Throat:      Pharynx: Oropharynx is clear.   Eyes:      General:         Right eye: No discharge.         Left eye: No discharge.      Conjunctiva/sclera: Conjunctivae normal.   Cardiovascular:      Rate and Rhythm: Normal rate and regular rhythm.   Pulmonary:      Effort: Pulmonary effort is normal. No respiratory distress.      Breath sounds: No wheezing.      Comments: Decreased breath sounds in lower lung fields bilateral.  Abdominal:      General: Abdomen is flat.      Tenderness: There is no abdominal tenderness. There is no right CVA tenderness, left CVA tenderness or guarding.   Genitourinary:     Penis: Swelling present.       Comments:  Scrotal edema, patient notes penis also more swollen. Not painful for patient.  Musculoskeletal:      Cervical back: Normal range of motion. No rigidity.      Right lower leg: Edema present.      Left lower leg: Edema present.   Skin:     General: Skin is warm and dry.      Coloration: Skin is not jaundiced.      Findings: No rash.   Neurological:      Mental Status: He is alert and oriented to person, place, and time. Mental status is at baseline.   Psychiatric:         Attention and Perception: Attention normal.         Mood and Affect: Mood and affect normal.         Speech: Speech normal. Speech is not delayed or slurred.         Behavior: Behavior is agitated (wants fluid off).         Thought Content: Thought content normal.         Cognition and Memory: Cognition and memory normal.           Significant Labs: All pertinent labs within the past 24 hours have been reviewed.    Bilirubin:   Recent Labs   Lab 10/21/22  1758   BILITOT 1.0     BMP:   Recent Labs   Lab 10/21/22  1758   GLU 84      K 2.9*   CL 95   CO2 30*   BUN 10   CREATININE 3.7*   CALCIUM 9.2     CBC:   Recent Labs   Lab 10/21/22  1758 10/21/22  1804   WBC 3.70*  --    HGB 8.6*  --    HCT 27.3* 33*   *  --      CMP:   Recent Labs   Lab 10/21/22  1758      K 2.9*   CL 95   CO2 30*   GLU 84   BUN 10   CREATININE 3.7*   CALCIUM 9.2   PROT 7.5   ALBUMIN 3.0*   BILITOT 1.0   ALKPHOS 77   AST 25   ALT 8*   ANIONGAP 14     Lactic Acid:   Recent Labs   Lab 10/21/22  1954   LACTATE 2.8*         Significant Imaging: I have reviewed all pertinent imaging results/findings within the past 24 hours.    Assessment/Plan:     * Hypotension  Patient presenting with volume overload, missed his dialysis on 10/21 due to becoming hypotensive and short of breath. Nephrology consulted with plans to initiate dialysis. Recommended 500 mL fluid bolus to see if can help with hypotension. Blood pressure improved with fluid bolus. Shortness of breath likely  2/2 to volume overload has he has significant distention of neck veins, decreased breath sounds in lower lungs and LE edema.    DDX: ESRD requiring dialysis, CHF exacerbation, Cardiorenal syndrome, sepsis    --Hold blood pressure medications.  --Given 500mL bolus in ED with good response  --Patient became hypotensive before initiating dialysis. Likely hypotensive 2/2 to volume overload, with hx of poor heart function (EF 10-15). Suspect hypotension will improve with volume removal.  --Midodrine 10mg TID  --Follow up repeat Lactic acid  --Obtain formal Echo  --not suspecting infection as trigger  --PT/OT  --Renal diet      Scrotal edema  Patient notes new scrotal and penile edema, that is consistent with being in a volume overload state. Should improve with dialysis hopefully.    --monitor for evidence of pain/paraphimosis      Volume overload  Suspect patient is volume overload 2/2 to not getting dialysis today.  Will hold lasix for now, with plans for him to attempt to get dialysis with nephro and since he experienced hypotension requiring fluid replacement.        Lactic acidosis  Suspect 2/2 to hypotension/volume overload today  Repeat lactic acid level to see if down-trending    --Repeat lactic acid for 6 am  --if liver dysfunction can have baseline elevated lactic acid    Anemia due to chronic kidney disease  Patient with stable hemoglobin level at 8.6 today. No active signs of bleeding. Suspect 2/2 to ESRD.     --CBC daily  --Transfuse for Hgb less than 7 or if active bleeding occurs.  --patient with history of iron deficiency anemia will repeat studies      Dissecting aneurysm of thoracic aorta, [place] type B  See ascending aortic dissection.      Ascending aortic dissection  Patient presenting with shortness of breath today and low blood pressure. ED ordered CXR w/ concern for worsening aortic dissection. CT scan done to further evaluate and  CTS noted that this is a chronic dissection of ascending and  descending aortic arch that patient is known to have. Patient is also not a surgical candidate per CTS given his many comorbidites. Patient likely with shortness of breath from being volume overload.    --Follow up CTS recs        HFrEF (heart failure with reduced ejection fraction)  Patient with volume overload, likely 2/2 to needing dialysis vs heart failure exacerbation. He has HFrEF with last echo in 2017 showing EF of 10-15%. He has ICD placed.    -Repeat echo  -BNP  -Hold lasix for now  -restart GDMT components when safe and not hypotensive.  -suspect heart function will improve some with removing fluid w/ dialysis  -repeat iron labs, if anemic consider IV iron to reduce exacerbations    ESRD (end stage renal disease)      --follow up Nephrology recs for diaylsis  --hold potassium supplements at this time  --will hold off on diuretics at this time given hypotensive, needing fluids to correct, will have patient go to dialysis when able per Nephro perspective.  --Patient has HD access in R groin      Hypokalemia  K of 2.9, will not replenish at this time given ESRD patient.      Tobacco abuse  Encourage smoking cessation .   after feeling better from volume overload.      Thrombocytopenia  Platelets 139 today, stable compared to prior lab values.    Cbc daily  transfuse if less than 10      HTN (hypertension)  Hold home blood pressure medications given patient experiencing hypotension.      --Restart BP meds when safe and not hypotensive.      CAD (coronary artery disease)  Continue ASA 81 daily  Continue Atorvastatin 40mg daily.      Hypertension  Hold anti-hypertesnive blood pressure medications.   -hold Isosorbide mononitrate 20mg  -hold metoprolol succinate 25mg 24 hr tablet  -Hold lasix and bumex at this time.  -Per chart review it appears patient is on amiodarone 400mg daily, will hold for now given hypotension.           VTE Risk Mitigation (From admission, onward)         Ordered     heparin  (porcine) injection 5,000 Units  Every 8 hours         10/22/22 0017     IP VTE HIGH RISK PATIENT  Once         10/21/22 2237     Place sequential compression device  Until discontinued         10/21/22 2237                   Misbah Bartlett MD  Department of Hospital Medicine   [facility] - Emergency Dept

## 2022-10-22 NOTE — ASSESSMENT & PLAN NOTE
Nayan Hernandez is a 62 yo M hx of  HCV, HFrEF ( EF 10% w/ MR + TR), pHTN, HTN, HLD, ESRD MWF (within the past month) with permacath in right groin, pAF however not on AC, chronic Type B dissection s/p intervention 2011, non-compliance (poor follow up, missed HD sessions) and unfortunately low health literacy who was admitted for hypotension that has now improved with short course of IVF.     Recommendations  - Patient appears euvolemic on exam and was about to be initiated on HD at the time of my evaluation.   - Doesn't appear to be in cardiogenic shock at this time, however we'll follow up closely with repeat lactic acid and labs.   - Unclear if patient has been on midodrine at home. If midodrine was started this admit, please hold it for now given his severely reduced EF. This will also allow us to assess how he truly does with HD today, as it is unclear if hypotension with HD has actually been a chronic issue with him.   - Please obtain med reconcillation- can discuss with pharmacy to determine what he's actually filling, though it appears he may be non-compliant  - Do not recommend dobutamine infusion for now. We'll monitor and see how he does following dialysis.

## 2022-10-22 NOTE — ASSESSMENT & PLAN NOTE
Patient with volume overload, likely 2/2 to needing dialysis vs heart failure exacerbation. He has HFrEF with last echo in 2017 showing EF of 10-15%. He has ICD placed.    -Repeat echo  -BNP  -Hold lasix for now  -restart GDMT components when safe and not hypotensive.  -suspect heart function will improve some with removing fluid w/ dialysis

## 2022-10-22 NOTE — PROGRESS NOTES
[facility]     Patient currently on hemodialysis for removal of uremic toxins and volume.     Patient seen and evaluated on hemodialysis, tolerating treatment, see HD flowsheet for vitals and assessments.    Labs have been reviewed and the dialysate bath has been adjusted.       Assessment/Plan:    -Patient seen on HD, tolerating treatment well, w/o complaints   -Hypervolemic on exam, scrotal edema, +JVD, +2 pitting edema, C/o orthopnea, SOB, decreased breath sounds bilaterally    -UF goal of 2-3L, may benefit from UF only tx again tomorrow   -hold midodrine due to severely reduced EF   -Renal diet, if not NPO   -Strict I/O's and daily weights  -Daily renal function panels  -Keep MAP >65 while on HD   -Maintain Hgb >7.0  -CTA 10/22 with evidence of volume overload. Cardiomegaly with right heart strain.  Additional findings suggesting stigmata of cardiac dysfunction and 3rd spacing including small bilateral pleural effusions, bilateral nonspecific pulmonary mosaic attenuation suggesting pulmonary edema, ascites, mesenteric edema and diffuse body wall edema/anasarca. CT findings suggesting hepatic congestion, with superimposed steatosis not excluded  -Will continue to follow while inpatient     Sylvia Zepeda DNP-FNP, C  Nephrology  Pager: [phone]

## 2022-10-22 NOTE — NURSING
Pt complained of SOB, chest pain. O2 NC increased to 4L , O2 sat 99%. Patient is constantly bearing down, to help with his SOB. MD Patton  at bedside.Ordered STAT EKG, LABS, Xray.

## 2022-10-22 NOTE — ASSESSMENT & PLAN NOTE
Suspect 2/2 to hypotension/volume overload today  Repeat lactic acid level to see if down-trending    --Repeat lactic acid for 6 am

## 2022-10-22 NOTE — HPI
"64 yo M PMHx HCV, HFrEF 10% w/ MR + TR, pHTN, HTN, HLD, ESRD MWF, pAF not on AC, chronic Type B dissection s/p intervention 2011 presenting from HD clinic for hypotensive episode that caused him to not be able to complete his HD. Patient presented with BP 96/58, mentation intact. Patient's primary complaint on arrival was his subjective feeling of volume overload, swollen scrotum, and BLE edema. Denies chest pain, dyspnea, abdominal pain, fevers, chills, nausea, vomiting, diarrhea, constipation, dysuria. Patient states he makes some urine, but lately has had difficulty making urine because he feels "tight".     CXR demonstrated small bilateral pleural effusions, large thoracic aortic aneurysm. CTA demonstrated signs concerning for Type A dissection, RH strain, pulmonary edema. Vascular consulted in ED, report that these findings are chronic and w/ previous intervention in 2011; no current surgical intervention plans. Nephrology consulted for HD, recommended 500 cc IVF bolus and evaluation for HD. MICU consulted for hypotension. Patient responsive to fluids on initial evaluation.   "

## 2022-10-22 NOTE — NURSING TRANSFER
Nursing Transfer Note      10/22/2022     Reason patient is being transferred: cardiac stepdown care    Transfer From: ED    Transfer via stretcher    Transfer with 1 L O2, cardiac monitoring, pt belongings    Transported by transport tech    Medicines sent: No    Any special needs or follow-up needed: cardiac monitoring, dialysis, continuous SPO2 monitoring    Chart send with patient: Yes    Notified: daughter    Patient reassessed at: 10/22/2022 @0145    Upon arrival to floor: cardiac monitor applied, patient oriented to room, call bell in reach, and bed in lowest position

## 2022-10-22 NOTE — ASSESSMENT & PLAN NOTE
ECG with atrial fibrillation. Patient reports he previous hx of atrial fib. Afib from OSH was documented as of early this month.   CHADSVASc 3.     - Anticoagulation held in previous admission given hx of significant aneurysmal dilatation of the thoracic aorta. Continue to hold.  - Ensure to monitor and correct electrolytes

## 2022-10-22 NOTE — HPI
68 yo M PMHx of hepatitis C, CHF with 10%EF, CAD, moderate mitral valve regurgitation, moderate tricuspid valve regurgitation, HTN, HLD, pulmonary HTN, end-stage renal disease on HD MWF (last 5 months) who was admitted on 10/21 for hypotension at the dialsis clinic. CXR revealed an enlarging thoracic aneurysm from previous.      Nephrology consulted for ESRD with HD needs    ESRD on hemodialysis  Outpatient HD Information:  -Dialysis modality: Hemodialysis  -Outpatient HD unit: [facility]   -Nephrologist: Dr. Childs  -HD TX days: Monday/Friday, duration of treatment: unk  -Last HD TX prior to hospital admission: 09/29/2022  -Dialysis access: dialysis catheter placed on R femoral   -Residual urine: oliguric   -EDW: ?

## 2022-10-22 NOTE — ASSESSMENT & PLAN NOTE
Suspect patient is volume overload 2/2 to not getting dialysis today.  Will hold lasix for now, with plans for him to attempt to get dialysis with nephro and since he experienced hypotension requiring fluid replacement.

## 2022-10-22 NOTE — SUBJECTIVE & OBJECTIVE
Past Medical History:   Diagnosis Date    Aortic dissection     [place] Type A     Cardiac defibrillator in place     CHF (congestive heart failure) 12/15/14    EF 15-20%    Coronary artery disease     ETOH abuse     Hepatitis C antibody test positive     Hypertension     S/P aortic dissection repair 2011       Past Surgical History:   Procedure Laterality Date    CORONARY ARTERY BYPASS GRAFT         Review of patient's allergies indicates:  No Known Allergies    Family History    None       Tobacco Use    Smoking status: Every Day    Smokeless tobacco: Never   Substance and Sexual Activity    Alcohol use: Yes     Alcohol/week: 0.0 standard drinks     Types: 1 - 2 drink(s) per week    Drug use: Not on file    Sexual activity: Not on file      Review of Systems   Constitutional:  Negative for chills, fatigue and fever.   HENT:  Negative for congestion and sore throat.    Eyes:  Negative for visual disturbance.   Respiratory:  Negative for cough, shortness of breath and wheezing.    Cardiovascular:  Positive for leg swelling. Negative for chest pain and palpitations.   Gastrointestinal:  Negative for abdominal pain, diarrhea, nausea and vomiting.   Endocrine: Negative for polyuria.   Genitourinary:  Positive for penile swelling and scrotal swelling. Negative for dysuria, flank pain and frequency.   Musculoskeletal:  Negative for arthralgias, back pain and myalgias.   Skin:  Negative for pallor and rash.   Neurological:  Negative for light-headedness and headaches.   Objective:     Vital Signs (Most Recent):  Temp: 97.4 °F (36.3 °C) (10/21/22 2002)  Pulse: 91 (10/21/22 2002)  Resp: 20 (10/21/22 2002)  BP: 114/74 (10/21/22 2002)  SpO2: 100 % (10/21/22 2002) Vital Signs (24h Range):  Temp:  [97.4 °F (36.3 °C)-98 °F (36.7 °C)] 97.4 °F (36.3 °C)  Pulse:  [] 91  Resp:  [18-20] 20  SpO2:  [98 %-100 %] 100 %  BP: ()/(58-75) 114/74   Weight: 69.9 kg (154 lb)  Body mass index is 24.12 kg/m².    No intake or output  data in the 24 hours ending 10/21/22 2227    Physical Exam  Vitals and nursing note reviewed.   Constitutional:       General: He is not in acute distress.     Appearance: He is not toxic-appearing.   HENT:      Head: Normocephalic and atraumatic.      Mouth/Throat:      Mouth: Mucous membranes are moist.      Pharynx: No oropharyngeal exudate.   Eyes:      Extraocular Movements: Extraocular movements intact.      Pupils: Pupils are equal, round, and reactive to light.   Cardiovascular:      Rate and Rhythm: Normal rate and regular rhythm.      Heart sounds: No murmur heard.  Pulmonary:      Effort: Pulmonary effort is normal.      Breath sounds: No wheezing or rales.   Abdominal:      General: Abdomen is flat. Bowel sounds are normal. There is no distension.      Tenderness: There is no abdominal tenderness. There is no guarding.   Genitourinary:     Comments: Scrotal swelling  Musculoskeletal:         General: No swelling or tenderness. Normal range of motion.      Cervical back: Normal range of motion.      Right lower leg: Edema (2+ pitting edma) present.      Left lower leg: Edema (2+ pitting edema) present.   Lymphadenopathy:      Cervical: No cervical adenopathy.   Skin:     General: Skin is warm.      Coloration: Skin is not jaundiced.      Findings: No bruising or rash.   Neurological:      General: No focal deficit present.      Mental Status: He is alert and oriented to person, place, and time.      Cranial Nerves: No cranial nerve deficit.       Vents:     Lines/Drains/Airways       Peripheral Intravenous Line  Duration                  Peripheral IV - Single Lumen 10/21/22 1827 20 G Left Antecubital <1 day                  Significant Labs:    CBC/Anemia Profile:  Recent Labs   Lab 10/21/22  1758 10/21/22  1804   WBC 3.70*  --    HGB 8.6*  --    HCT 27.3* 33*   *  --    MCV 93  --    RDW 22.7*  --         Chemistries:  Recent Labs   Lab 10/21/22  1758      K 2.9*   CL 95   CO2 30*   BUN 10    CREATININE 3.7*   CALCIUM 9.2   ALBUMIN 3.0*   PROT 7.5   BILITOT 1.0   ALKPHOS 77   ALT 8*   AST 25       All pertinent labs within the past 24 hours have been reviewed.    Significant Imaging: I have reviewed all pertinent imaging results/findings within the past 24 hours.

## 2022-10-22 NOTE — PROGRESS NOTES
Dialysis completed. Right femoral catheter flushed, heparinized, capped and wrapped with sterile gauze. Patient dialyzed for 3.5 hours with fluid removal of 2.5 liters. Tolerated well with stable vital signs. Patient returned to his room via bed. Report given to primary care nurse.

## 2022-10-22 NOTE — CONSULTS
[facility]  Cardiology  Consult Note    Patient Name: Nayan Hernandez  MRN: [id number]  Admission Date: 10/21/2022  Hospital Length of Stay: 1 days  Code Status: Full Code   Attending Provider: Kelly Dugan MD   Consulting Provider: Carmen Araiza MD  Primary Care Physician: Eusebia Brady NP  Principal Problem:Hypotension    Patient information was obtained from patient, past medical records and ER records.     Inpatient consult to Cardiology  Consult performed by: Carmen Araiza MD  Consult ordered by: Oren Turcios MD      Subjective:     Chief Complaint:  Dyspnea     HPI:   Nayan Hernandez is a 62 yo M hx of  HCV, HFrEF ( EF 10% w/ MR + TR), pHTN, HTN, HLD, ESRD MWF (within the past month) with permacath in right groin, pAF however not on AC, chronic Type B dissection s/p intervention 2011 (deemed inoperable) who was admitted for hypotension. The patient is a poor historian. The patient presented from HD clinic for hypotensive episode that caused him to not be able to complete his HD. He reprots dyspnea, PND for the past few days, associated with chronic LE edema. He denies lightheadedness. Of note, the patient had an infected PPM lead that was treated with abx due to high risk of extraction. He is well known to palliative care and is considering home based palliative program. He currently doesn't follow with cardiologist in the clinic. He is on midodrine 10mg TID for chronic hypotension. He is off GDMT.     Upon arrival to the ED, his BP improved wit 100/70s with 500cc of IVF. A CTA C/A/P was obtained due to enlarging aortic aneurysm on CXR. The CTA C/A/P demonstrated aortic dissection extending to the right common iliac and left proximal internal iliac artery with significant aneurysmal dilatation of the thoracic aorta and dissection flap involves the proximal right subclavian artery and also left renal artery. CTS evalauted the patient and deemed him inoperable as these findings aren't a  surgical indication at this time, as well as concern that a redo operation on his arch would likely be fatal. Also demeed him not a candidate for advanced options given co-morbidities.  Lactic was 2.8 on admit that increased to 3.3. Nephrology consutled and ordered for HD this AM. Cardiology was consulted to determine if dobutamine infusion at home will be appropriate.          Past Medical History:   Diagnosis Date    Aortic dissection     [place] Type A     Cardiac defibrillator in place     CHF (congestive heart failure) 12/15/14    EF 15-20%    Coronary artery disease     ETOH abuse     Hepatitis C antibody test positive     Hypertension     S/P aortic dissection repair 2011       Past Surgical History:   Procedure Laterality Date    CORONARY ARTERY BYPASS GRAFT         Review of patient's allergies indicates:  No Known Allergies    No current facility-administered medications on file prior to encounter.     Current Outpatient Medications on File Prior to Encounter   Medication Sig    carvedilol (COREG) 3.125 MG tablet Take 1 tablet (3.125 mg total) by mouth 2 (two) times daily.    furosemide (LASIX) 40 MG tablet Take one pill twice a day. Take one additional dose if weight increases more than 2 lbs in one day.    lisinopril (PRINIVIL,ZESTRIL) 2.5 MG tablet Take 1 tablet (2.5 mg total) by mouth once daily.    pravastatin (PRAVACHOL) 40 MG tablet Take 1 tablet (40 mg total) by mouth once daily.    spironolactone (ALDACTONE) 25 MG tablet Take 1 tablet (25 mg total) by mouth once daily.     Family History    None       Tobacco Use    Smoking status: Every Day    Smokeless tobacco: Never   Substance and Sexual Activity    Alcohol use: Yes     Alcohol/week: 0.0 standard drinks     Types: 1 - 2 drink(s) per week    Drug use: Not on file    Sexual activity: Not on file     Review of Systems   Constitutional: Negative for chills and fever.   Cardiovascular:  Positive for dyspnea on exertion and orthopnea. Negative for  chest pain, near-syncope, palpitations and syncope.   Respiratory:  Positive for shortness of breath.    Endocrine: Negative.    Gastrointestinal: Negative.    Genitourinary: Negative.    Neurological: Negative.    Psychiatric/Behavioral: Negative.     Objective:     Vital Signs (Most Recent):  Temp: 97.7 °F (36.5 °C) (10/22/22 1140)  Pulse: 101 (10/22/22 1043)  Resp: (!) 26 (10/22/22 1043)  BP: 101/74 (10/22/22 1145)  SpO2: 100 % (10/22/22 1043)   Vital Signs (24h Range):  Temp:  [97.2 °F (36.2 °C)-98.2 °F (36.8 °C)] 97.7 °F (36.5 °C)  Pulse:  [] 101  Resp:  [16-26] 26  SpO2:  [94 %-100 %] 100 %  BP: ()/(58-75) 101/74     Weight: 74 kg (163 lb 2.3 oz)  Body mass index is 25.55 kg/m².    SpO2: 100 %  O2 Device (Oxygen Therapy): nasal cannula      Intake/Output Summary (Last 24 hours) at 10/22/2022 1229  Last data filed at 10/22/2022 0950  Gross per 24 hour   Intake --   Output 30 ml   Net -30 ml       Lines/Drains/Airways       Central Venous Catheter Line  Duration                  Hemodialysis Catheter right femoral -- days              Peripheral Intravenous Line  Duration                  Peripheral IV - Single Lumen 10/21/22 1827 20 G Left Antecubital <1 day                    Physical Exam  Constitutional:       General: He is not in acute distress.     Appearance: Normal appearance. He is normal weight. He is not ill-appearing.   HENT:      Head: Normocephalic.      Mouth/Throat:      Mouth: Mucous membranes are moist.   Eyes:      Extraocular Movements: Extraocular movements intact.   Neck:      Vascular: JVD present.   Cardiovascular:      Rate and Rhythm: Normal rate and regular rhythm.      Pulses: Normal pulses.   Pulmonary:      Effort: Pulmonary effort is normal.      Comments: Decreased breath sounds bilaterally  Abdominal:      General: Abdomen is flat. Bowel sounds are normal.   Musculoskeletal:         General: No swelling (2+ bilateral pitting edema). Normal range of motion.       Cervical back: Normal range of motion.   Skin:     General: Skin is warm.      Coloration: Skin is not jaundiced.   Neurological:      Mental Status: He is alert and oriented to person, place, and time. Mental status is at baseline.   Psychiatric:         Mood and Affect: Mood normal.         Behavior: Behavior normal.         Thought Content: Thought content normal.       Significant Labs: BMP:   Recent Labs   Lab 10/21/22  1758 10/22/22  0545   GLU 84 67*    141   K 2.9* 3.2*   CL 95 96   CO2 30* 30*   BUN 10 12   CREATININE 3.7* 3.6*   CALCIUM 9.2 9.0   MG  --  1.9   , CMP   Recent Labs   Lab 10/21/22  1758 10/22/22  0545    141   K 2.9* 3.2*   CL 95 96   CO2 30* 30*   GLU 84 67*   BUN 10 12   CREATININE 3.7* 3.6*   CALCIUM 9.2 9.0   PROT 7.5 7.3   ALBUMIN 3.0* 2.9*   BILITOT 1.0 1.5*   ALKPHOS 77 78   AST 25 22   ALT 8* 6*   ANIONGAP 14 15   , CBC   Recent Labs   Lab 10/21/22  1758 10/21/22  1804 10/22/22  0545   WBC 3.70*  --  3.61*   HGB 8.6*  --  8.5*   HCT 27.3*   < > 27.5*   *  --  137*    < > = values in this interval not displayed.   , INR No results for input(s): INR, PROTIME in the last 48 hours., and Troponin No results for input(s): TROPONINI in the last 48 hours.    Significant Imaging:   Imaging Results               CTA Chest Abdomen Pelvis (Final result)  Result time 10/21/22 21:05:00      Final result by Surya Barragan MD (10/21/22 21:05:00)                   Impression:      [place] A aortic dissection extending to the right common iliac and left proximal internal iliac artery with significant aneurysmal dilatation of the thoracic aorta, as further outlined above.  Dissection flap involves the proximal right subclavian artery and also left renal artery.    Cardiomegaly with right heart strain.  Additional findings suggesting stigmata of cardiac dysfunction and 3rd spacing including small bilateral pleural effusions, bilateral nonspecific pulmonary mosaic attenuation  suggesting pulmonary edema, ascites, mesenteric edema and diffuse body wall edema/anasarca.    CT findings suggesting hepatic congestion, with superimposed steatosis not excluded.  Clinical correlation and with LFTs advised.    Right basilar compressive atelectasis.    Diverticulosis coli.    Left axillary mild lymphadenopathy, nonspecific.    Additional findings as above.    This report was flagged in Epic as abnormal.    COMMUNICATION  This critical result was discovered/received at 19:45.  The critical information above was relayed directly by Umer Yousif MD by telephone to Niecy Romero PA-C at 19:48.    Electronically signed by resident: Umer Yousif  Date:    10/21/2022  Time:    19:52    Electronically signed by: Surya Barragan MD  Date:    10/21/2022  Time:    21:05               Narrative:    EXAMINATION:  CTA CHEST ABDOMEN PELVIS    CLINICAL HISTORY:  Aortic aneurysm, known or suspected;    TECHNIQUE:  Axial CT angiogram images of the chest, abdomen, and pelvis were obtained before and after the administration of 100 cc of  Omnipaque 350 IV, from the lung apices through the ischial tuberosities, per aortic dissection protocol. Coronal and sagittal reconstructions of the abdominal aorta and visceral arteries performed.    COMPARISON:  Chest radiograph 10/21/2022 and 11/3/2017    FINDINGS:  Vascular:    There is extensive moderate to advanced calcific atherosclerosis of the thoracic and abdominal extending into its bilateral iliac branches in the pelvis.  [place] type A dissection (axial series 5 image 176) involving the ascending aorta beginning at the aortic root, extending through the arch to involve the proximal right subclavian artery (axial series 5 image 151), and caudally along the descending thoracic and entire abdominal aorta, left renal artery (axial series 4, image 582), the common iliac on the right to the level of the bifurcation (axial series 4, image 735) and the proximal internal  iliac artery on the left (axial series 4, image 759).  No convincing evidence of previous aortic dissection repair.  The true lumen is significantly narrowed at the level of the descending thoracic aorta and starts to equalize at the infrarenal aorta.  The celiac, SMA, ELIOT and right renal artery arise from the true lumen and remain widely patent.  The right subclavian artery and left renal artery also remain patent at this time.  The size of the aneurysm appears slightly increased on 10/21/2022 chest radiograph in comparison to 2017.  The dissection lumen measure up to 7.5 cm on the transversely (axial series 5, image 150).  No convincing evidence of contrast extravasation, adjacent inflammation to suggest aortic rupture at this time.  The IVC is significantly dilated measuring 5.1 cm (axial series 7 image 656).  Left central venous catheter with tip in the SVC.    Bolus timing is suboptimal for the evaluation of pulmonary thromboembolism noting there is weak opacification of the pulmonary arteries.  Allowing for this, no large central pulmonary arterial filling defect, and no definite filling defect to the level of the proximal lobar arteries.    Chest, abdomen and pelvis:    Soft tissue structures of the base of the neck.  Left chest wall pacer with the tip in the right atrium.  Small lipoma at the anterior aspect of the right latissimus dorsi muscle.  Extensive collateral vessel seen throughout the imaged lower neck and left greater than right chest and partially imaged left upper extremity likely related to cardiac dysfunction as well as significantly reduced aortic caliber of the true lumen within the descending thoracic aorta secondary to significant mass effect from large false lumen of the aneurysm.    The trachea is midline, the proximal airways are patent and the lungs are symmetrically expanded.  Dependent bilateral ground-glass opacities, likely compression atelectasis from adjacent bilateral pleural  effusion more on the right.  Mild patchy nonspecific pulmonary mosaic attenuation likely sequela of small vessels disease including pulmonary edema versus less likely small airways disease.  No pneumothorax.    The heart significantly enlarged heart with dilated right ventricle and flattening of the interventricular septum, suggesting right heart strain.  There are coronary artery calcifications.  No cardiac thrombus seen.    The esophagus maintains a normal course and caliber.    Few prominent and also mildly enlarged lymph nodes at the left axilla measuring up to 11 mm in maximum short axis, nonspecific.  No significant right axillary, mediastinal, or hilar lymphadenopathy.    The liver is normal in size with diffuse decreased attenuations and nutmug appearance suggesting hepatic congestion.  No focal hepatic abnormality.  There is no intra-or extrahepatic biliary ductal dilatation. The gallbladder, stomach, spleen, pancreas, and adrenal glands are unremarkable.    Left kidney is small in size.  Normal enhancement of the kidneys.  Left superior pole 1.3 cm hypodense renal lesion, likely renal cyst. No evidence of hydronephrosis.  The ureters appear normal in course and caliber without evidence of ureteral dilatation. Diffuse bladder wall thickening, likely due to nondistention.    The visualized loops of small and large bowel show no evidence of obstruction or inflammation.  Scattered colonic diverticula without definite acute diverticulitis.  No pneumatosis or portal venous gas.  Extensive free fluid in the abdomen and pelvis with diffuse mesenteric edema.  No free air.  Small ascites containing umbilical hernia.    Osseous structures demonstrate mild degenerative change.  Mild diastasis recti.  There is diffuse body wall edema consistent with anasarca.                                        X-Ray Chest AP Portable (Final result)  Result time 10/21/22 18:55:43      Final result by Eliot Estrada DO (10/21/22  18:55:43)                   Impression:      Large thoracic aortic aneurysm.  Further evaluation with CTA chest is recommended if clinically indicated.    Small bilateral pleural effusions.    Cardiomegaly.    This report was flagged in Epic as abnormal.    Dr. Estrada discussed critical findings with THIERNO Romero by telephone at 18:50 on 10/21/2022.      Electronically signed by: Eliot Estrada  Date:    10/21/2022  Time:    18:55               Narrative:    EXAMINATION:  XR CHEST AP PORTABLE    CLINICAL HISTORY:  Shortness of breath    TECHNIQUE:  Single frontal view of the chest was performed.    COMPARISON:  11/03/2017.  Correlation is also made to report of chest radiograph from 09/18/2022.    FINDINGS:  There is a large aneurysm of the aortic arch measuring up to approximately 9 cm.  Previously on chest x-ray from 11/03/2017 this measures approximately 6.5 cm.  On chest x-ray report (images not available at this time) from 09/18/2022, the aneurysm was described as measuring 8.5 cm.  There are small bilateral pleural effusions.  There is mild interstitial prominence.  There is a nodular opacity overlying the right lower lung, likely nipple shadow, a symmetric opacity is also seen on the left, overlying the ribs.  Surgical clips are noted in the right shoulder region.  There are median sternotomy wires.  The cardiac silhouette is enlarged, unchanged.  The visualized osseous structures are intact.                                        Assessment and Plan:     * Hypotension  Refer to 'acute on chronic systolic HF'    Paroxysmal atrial fibrillation  ECG with atrial fibrillation. Patient reports he previous hx of atrial fib. Afib from OSH was documented as of early this month.   CHADSVASc 3.     - Anticoagulation held in previous admission given hx of significant aneurysmal dilatation of the thoracic aorta. Continue to hold.  - Ensure to monitor and correct electrolytes    Acute on chronic systolic heart failure  Nayan  David is a 62 yo M hx of  HCV, HFrEF ( EF 10% w/ MR + TR), pHTN, HTN, HLD, ESRD MWF (within the past month) with permacath in right groin, pAF however not on AC, chronic Type B dissection s/p intervention 2011, non-compliance (poor follow up, missed HD sessions) admitted for hypotension that has now improved with short course of IVF.     Recommendations  - Patient appears euvolemic on exam and was about to be initiated on HD at the time of my evaluation.   - Pt with lactic acidosis (2->3.3). Do not suspect cardiogenic shock at this time , however we'll follow up closely with repeat lactic acid and labs. Repeat lactic pending. Please call cardiology fellow on call if lactic is uprising.   - Continue home midodrine TID  - Do not recommend dobutamine infusion for now. We'll monitor and see how he does following dialysis.       Ascending aortic dissection  Patient with known chronic dissection involving the aortic arch and descending aorta. CTA this admit demonstrated below:   The CTA C/A/P demonstrated aortic dissection extending to the right common iliac and left proximal internal iliac artery with significant aneurysmal dilatation of the thoracic aorta and dissection flap involves the proximal right subclavian artery and also left renal artery.    - CTS evalauted the patient and deemed him a non-surgical candidate  - Monitor BP and avoid hypertensive episodes.     ESRD (end stage renal disease)  - HD for volume removal .       VTE Risk Mitigation (From admission, onward)           Ordered     heparin (porcine) injection 5,000 Units  Every 8 hours         10/22/22 0017     IP VTE HIGH RISK PATIENT  Once         10/21/22 2237     Place sequential compression device  Until discontinued         10/21/22 2237                    Thank you for your consult. I will follow-up with patient. Please contact us if you have any additional questions.    Carmen Araiza MD  Cardiology   [facility] - Card[facility] Step[facility]

## 2022-10-22 NOTE — ASSESSMENT & PLAN NOTE
--follow up Nephrology recs for diaylsis  --hold potassium supplements at this time  --will hold off on diuretics at this time given hypotensive, needing fluids to correct, will have patient go to dialysis when able per Nephro perspective.  --Patient has HD access in [facility]

## 2022-10-22 NOTE — ASSESSMENT & PLAN NOTE
Hold anti-hypertesnive blood pressure medications.   -hold Isosorbide mononitrate 20mg  -hold metoprolol succinate 25mg 24 hr tablet  -Hold lasix and bumex at this time.  -Per chart review it appears patient is on amiodarone 400mg daily, will hold for now given hypotension.

## 2022-10-22 NOTE — ASSESSMENT & PLAN NOTE
ECG with atrial fibrillation. Patient reports he previous hx of atrial fib. Afib from OSH was documented as of early this month.   CHADSVASc 3.     - Please place device interrogation to determine Atrial fib history  - Given CHADsVASc, can initiate anticoagulation for stroke prophylaxis as aortic dissection is chronic  - Continue telemetry  - maintain K>4, Mg>2

## 2022-10-22 NOTE — ASSESSMENT & PLAN NOTE
Patient with known chronic dissection involving the aortic arch and descending aorta. CTA this admit demonstrated below:   The CTA C/A/P demonstrated aortic dissection extending to the right common iliac and left proximal internal iliac artery with significant aneurysmal dilatation of the thoracic aorta and dissection flap involves the proximal right subclavian artery and also left renal artery.    - CTS evalauted the patient and deemed him a non-surgical candidate  - Monitor BP and avoid hypertensive episodes.

## 2022-10-22 NOTE — ASSESSMENT & PLAN NOTE
Patient notes new scrotal and penile edema, that is consistent with being in a volume overload state. Should improve with dialysis hopefully.    --monitor for evidence of pain/paraphimosis

## 2022-10-22 NOTE — ASSESSMENT & PLAN NOTE
Chronic aortic dissection     67M with ESRD on MWF HD, HFrEF (10%), HTN, HLD, pAF not on AC, and an ascending aortic dissection s/p repair with known chronic type B dissection who reportedly presented with hypotension during dialysis today. Per patient he was brought to the ED after not completing HD, presumably for hypotension (no HD clinic records, patient does not recall low BP). He denies any infectious symptoms and only complains of dyspnea, lower ext edema, and scrotal swelling.     On arrival patient afebrile and hemodynamically stable with BP as low as 98/58. On 3L NC. Nephrology consulted, who recommended 500cc IVF. CTS consulted over signs of aortic dissection on CTA chest & CXR, who believe CTA findings are chronic and not an active dissection. At the time of consultation, patient's /71 (83).     Recommendations:  - Patient hemodynamically stable on initial evaluation; 500 cc bolus hanging  - S/P 500cc IVF bolus, judicious fluids PRN s/o HFrEF and volume overload requiring HD  - Nephrology consulted for HD needs  - Recommend stable for admission to hospital medicine. Has no current indication for MICU admission given hemodynamic stability.

## 2022-10-23 PROBLEM — R57.0 CARDIOGENIC SHOCK: Status: ACTIVE | Noted: 2022-01-01

## 2022-10-23 NOTE — CARE UPDATE
Inpatient consult to Nephrology  Consult performed by: Rick Krause MD  Consult ordered by: Lul Bunn, DO         See progress  note from today

## 2022-10-23 NOTE — CARE UPDATE
Most recent hemodynamics are as follows:   CVP - 28  SVO2 - 43  CO - 4.8  CI - 2.54   SVR - 853

## 2022-10-23 NOTE — ASSESSMENT & PLAN NOTE
Outpatient HD Information:  -Dialysis modality: Hemodialysis  -Outpatient HD unit: [facility]   -Nephrologist: Dr. Childs  -HD TX days: Monday/Friday, duration of treatment: unk  -Last HD TX prior to hospital admission: 09/29/2022  -Dialysis access: dialysis catheter placed on R femoral   -Residual urine: oliguric   Admitted for hypotension and found to have enlarging thoracic aneurysm from previous      Recommendations  Last HD was yesterday with net neg 2L   Cardiogenic shock on dobutamine   CVP 22. On one liter NC  Need volume removal   Will do SCUFF for 12 hours followed by SLED. Will aim for neg 2-2.5 L in next 24 hours

## 2022-10-23 NOTE — PLAN OF CARE
CICU DAILY GOALS       A: Awake    RASS: Goal -    Actual -     Restraint necessity:    B: Breath   SBT: Not intubated   C: Coordinate A & B, analgesics/sedatives   Pain: managed    SAT: NA  D: Delirium   CAM-ICU: Overall CAM-ICU: Negative  E: Early(intubated/ Progressive (non-intubated) Mobility   MOVE Screen: bed rest for now   Activity: Activity Management: Arm raise - L1, Ankle pumps - L1, Rolling - L1  FAS: Feeding/Nutrition   Diet order: Diet/Nutrition Received: other (see comments), Specialty Diet/Nutrition Received: renal diet Fluid restriction:     Nutritional Supplement Intake: Quantity renal, Type:   T: Thrombus   DVT prophylaxis: VTE Required Core Measure: Pharmacological prophylaxis initiated/maintained  H: HOB Elevation   Head of Bed (HOB) Positioning: HOB at 20-30 degrees  U: Ulcer Prophylaxis   GI: yes  G: Glucose control   managed    S: Skin   Bundle compliance: yes   Bathing/Skin Care: incontinence care, dressed/undressed, bath, partial, linen changed Date: 10/22/22 at 2145  B: Bowel Function   no issues   I: Indwelling Catheters   Miles necessity:     CVC necessity: Yes   IPAD offered: No  D: De-escalation Antibx   No  Plan for the day   Continue to monitor hemodynamics. Possible HD therapy today.  Family/Goals of care/Code Status   Code Status: Full Code     No acute events throughout day, VS and assessment per flow sheet, patient progressing towards goals as tolerated, plan of care reviewed with Nayan Hernandez and family, all concerns addressed, will continue to monitor.

## 2022-10-23 NOTE — PROCEDURES
"Nayan Hernandez is a 67 y.o. male patient.    Temp: 97.4 °F (36.3 °C) (10/22/22 2145)  Pulse: (!) 111 (10/23/22 0100)  Resp: (!) 22 (10/23/22 0100)  BP: 95/60 (10/23/22 0100)  SpO2: 99 % (10/23/22 0100)  Weight: 74 kg (163 lb 2.3 oz) (10/22/22 0151)  Height: 5' 7" (170.2 cm) (10/22/22 0151)       Central Line    Date/Time: 10/23/2022 1:19 AM  Performed by: Carmen Araiza MD  Authorized by: Carmen Araiza MD     Location procedure was performed:  [facility] CARDIOLOGY  Consent Done ?:  Yes  Time out complete?: Verified correct patient, procedure, equipment, staff, and site/side    Indications:  Med administration, hemodynamic monitoring and vascular access  Anesthesia:  Local infiltration  Local anesthetic:  Lidocaine 1% without epinephrine  Preparation:  Skin prepped with chlorhexidine (without alcohol)  Skin prep agent dried: Skin prep agent completely dried prior to procedure    Sterile barriers: All five maximal sterile barriers used - gloves, gown, cap, mask and large sterile sheet    Hand hygiene: Hand hygiene performed immediately prior to central venous catheter insertion    Location:  Right internal jugular  Catheter type:  Triple lumen  Catheter size:  6 Fr  Ultrasound guidance: Yes    Manometry: Yes    Number of attempts:  1  Securement:  Line sutured, chlorhexidine patch, sterile dressing applied and blood return through all ports  XRay:  Placement verified by x-ray, no pneumothorax on x-ray, tip termination and successful placement  Adverse Events:  NoneTermination Site: superior vena cava    10/23/2022    "

## 2022-10-23 NOTE — PROGRESS NOTES
Pharmacokinetic Initial Assessment: IV Vancomycin    Assessment/Plan:    Patient is ESRD with iHD MWF. Last iHD session was today. Will pulse dose at this time given tenuous status and changing dialysis schedule.     Initiate intravenous vancomycin with loading dose of 1500 mg once with subsequent doses when random concentrations are less than 20 mcg/mL  Desired empiric serum trough concentration is 15 to 20 mcg/mL  Draw vancomycin trough level on 10/24 with am labs or sooner if iHD schedule changes.   Pharmacy will continue to follow and monitor vancomycin.      Please contact pharmacy at extension [phone] with any questions regarding this assessment.     Thank you for the consult,   Natalie Luna       Patient brief summary:  Nayan Hernandez is a 67 y.o. male initiated on antimicrobial therapy with IV Vancomycin for treatment of suspected sepsis    Drug Allergies:   Review of patient's allergies indicates:  No Known Allergies    Actual Body Weight:   74 kg    Renal Function:   Estimated Creatinine Clearance: 18.1 mL/min (A) (based on SCr of 3.6 mg/dL (H)).,     Dialysis Method (if applicable):  intermittent HD    CBC (last 72 hours):  Recent Labs   Lab Result Units 10/21/22  1758 10/22/22  0545 10/22/22  1849   WBC K/uL 3.70* 3.61* 3.37*   Hemoglobin g/dL 8.6* 8.5* 9.2*   Hematocrit % 27.3* 27.5* 30.9*   Platelets K/uL 139* 137* 154   Gran % % 64.8 66.7 64.6   Lymph % % 18.4 17.7* 19.9   Mono % % 13.8 13.0 13.1   Eosinophil % % 2.2 1.7 1.8   Basophil % % 0.5 0.6 0.3   Differential Method  Automated Automated Automated       Metabolic Panel (last 72 hours):  Recent Labs   Lab Result Units 10/21/22  1758 10/22/22  0545   Sodium mmol/L 139 141   Potassium mmol/L 2.9* 3.2*   Chloride mmol/L 95 96   CO2 mmol/L 30* 30*   Glucose mg/dL 84 67*   BUN mg/dL 10 12   Creatinine mg/dL 3.7* 3.6*   Albumin g/dL 3.0* 2.9*   Total Bilirubin mg/dL 1.0 1.5*   Alkaline Phosphatase U/L 77 78   AST U/L 25 22   ALT U/L 8* 6*    Magnesium mg/dL  --  1.9   Phosphorus mg/dL  --  3.5       Drug levels (last 3 results):  No results for input(s): VANCOMYCINRA, VANCORANDOM, VANCOMYCINPE, VANCOPEAK, VANCOMYCINTR, VANCOTROUGH in the last 72 hours.    Microbiologic Results:  Microbiology Results (last 7 days)       Procedure Component Value Units Date/Time    Blood culture [[id number]] Collected: 10/22/22 2000    Order Status: Sent Specimen: Blood Updated: 10/22/22 2032    Blood culture [[id number]] Collected: 10/22/22 2000    Order Status: Sent Specimen: Blood Updated: 10/22/22 2032

## 2022-10-23 NOTE — PROGRESS NOTES
Pt VS taken and blood glucose re-checked after giving pt orange juice. Pt assigned to room 3087. Report given to Naz. Pt transferred over with primary RN and PCT. Pt free from s/s of distress. Tele box and monitor removed.

## 2022-10-23 NOTE — H&P
[facility] - Cardiac Intensive Care  Cardiology  History and Physical     Patient Name: Nayan Hernandez  MRN: [id number]  Admission Date: 10/21/2022  Code Status: Full Code   Attending Provider: Henry Allred MD   Primary Care Physician: Eusebia Brady NP  Principal Problem:Hypotension    Patient information was obtained from patient, past medical records and ER records.     Subjective:     Chief Complaint:  Dyspnea     HPI:  Nayan Hernandez is a 64 yo M hx of  HCV, HFrEF ( EF 10% w/ MR + TR), pHTN, HTN, HLD, ESRD MWF (within the past month) with permacath in right groin, pAF however not on AC, chronic Type B dissection s/p intervention 2011 ( now deemed inoperable for repeat surgery) who was admitted for hypotension. The patient is a poor historian. The patient presented from HD clinic for hypotensive episode that caused him to not be able to complete his HD. He reports dyspnea, PND for the past few days, associated with chronic LE edema. He denies lightheadedness. Of note, the patient had an infected PPM lead that was treated with abx due to high risk of extraction. He is well known to palliative care and was considering home based palliative program. He currently doesn't follow with cardiologist in the clinic. He is on midodrine 10mg TID for chronic hypotension. He is off GDMT.     Upon arrival to the ED, his BP improved wit 100/70s with 500cc of IVF. A CTA C/A/P was obtained due to enlarging aortic aneurysm on CXR. The CTA C/A/P demonstrated aortic dissection extending to the right common iliac and left proximal internal iliac artery with significant aneurysmal dilatation of the thoracic aorta and dissection flap involves the proximal right subclavian artery and also left renal artery. CTS evalauted the patient and deemed him inoperable as these findings aren't a surgical indication at this time, as well as concern that a redo operation on his arch would likely be fatal. Also demeed him not a candidate for  advanced options given co-morbidities.  Lactic was 2.8 on admit that increased to 3.3 and worsened to 5. He completed HD with 2.5L of volume removal. Cardiology was re consulted for concerning of worsening lactic acidosis. Upon my evaluation, he appeared worse than prior, reported dyspnea and generalized body discomfort and cool hands. The patient was transferred to CICU for closer monitoring.       Past Medical History:   Diagnosis Date    Aortic dissection     Juan Carlos Type A     Cardiac defibrillator in place     CHF (congestive heart failure) 12/15/14    EF 15-20%    Coronary artery disease     ETOH abuse     Hepatitis C antibody test positive     Hypertension     S/P aortic dissection repair 2011       Past Surgical History:   Procedure Laterality Date    CORONARY ARTERY BYPASS GRAFT         Review of patient's allergies indicates:  No Known Allergies    No current facility-administered medications on file prior to encounter.     Current Outpatient Medications on File Prior to Encounter   Medication Sig    carvedilol (COREG) 3.125 MG tablet Take 1 tablet (3.125 mg total) by mouth 2 (two) times daily.    furosemide (LASIX) 40 MG tablet Take one pill twice a day. Take one additional dose if weight increases more than 2 lbs in one day.    lisinopril (PRINIVIL,ZESTRIL) 2.5 MG tablet Take 1 tablet (2.5 mg total) by mouth once daily.    pravastatin (PRAVACHOL) 40 MG tablet Take 1 tablet (40 mg total) by mouth once daily.    spironolactone (ALDACTONE) 25 MG tablet Take 1 tablet (25 mg total) by mouth once daily.     Family History    None       Tobacco Use    Smoking status: Every Day    Smokeless tobacco: Never   Substance and Sexual Activity    Alcohol use: Yes     Alcohol/week: 0.0 standard drinks     Types: 1 - 2 drink(s) per week    Drug use: Not on file    Sexual activity: Not on file     Review of Systems   Constitutional: Negative for chills, decreased appetite and fever.   Cardiovascular:   Positive for dyspnea on exertion and orthopnea. Negative for chest pain, irregular heartbeat, near-syncope, palpitations and syncope.   Respiratory:  Negative for shortness of breath.    Endocrine: Negative.    Gastrointestinal: Negative.    Genitourinary: Negative.    Neurological: Negative.    Psychiatric/Behavioral: Negative.     Objective:     Vital Signs (Most Recent):  Temp: 97.4 °F (36.3 °C) (10/23/22 0300)  Pulse: 104 (10/23/22 0439)  Resp: (!) 46 (10/23/22 0439)  BP: 100/64 (10/23/22 0400)  SpO2: 99 % (10/23/22 0439)   Vital Signs (24h Range):  Temp:  [96.6 °F (35.9 °C)-98.1 °F (36.7 °C)] 97.4 °F (36.3 °C)  Pulse:  [] 104  Resp:  [15-46] 46  SpO2:  [94 %-100 %] 99 %  BP: ()/(56-79) 100/64     Weight: 75.5 kg (166 lb 7.2 oz)  Body mass index is 26.06 kg/m².    SpO2: 99 %  O2 Device (Oxygen Therapy): nasal cannula      Intake/Output Summary (Last 24 hours) at 10/23/2022 0442  Last data filed at 10/23/2022 0300  Gross per 24 hour   Intake 1165.6 ml   Output 3180 ml   Net -2014.4 ml       Lines/Drains/Airways       Central Venous Catheter Line  Duration                  Hemodialysis Catheter right femoral -- days    Percutaneous Central Line Insertion/Assessment - Triple Lumen  10/23/22 0106 right internal jugular <1 day              Peripheral Intravenous Line  Duration                  Peripheral IV - Single Lumen 10/21/22 1827 20 G Left Antecubital 1 day         Peripheral IV - Single Lumen 10/22/22 2206 22 G Anterior;Distal;Left Forearm <1 day                    Physical Exam  Constitutional:       General: He is not in acute distress.     Appearance: Normal appearance. He is normal weight. He is not ill-appearing.   HENT:      Head: Normocephalic.      Mouth/Throat:      Mouth: Mucous membranes are moist.   Eyes:      Extraocular Movements: Extraocular movements intact.   Neck:      Vascular: JVD present.   Cardiovascular:      Rate and Rhythm: Normal rate and regular rhythm.      Pulses:  Normal pulses.   Pulmonary:      Effort: Pulmonary effort is normal.      Comments: Decreased breath sounds bilaterally  Abdominal:      General: Abdomen is flat. Bowel sounds are normal.   Musculoskeletal:         General: No swelling (2+ bilateral pitting edema). Normal range of motion.      Cervical back: Normal range of motion.   Skin:     General: Skin is warm.      Coloration: Skin is not jaundiced.   Neurological:      Mental Status: He is alert and oriented to person, place, and time. Mental status is at baseline.   Psychiatric:         Mood and Affect: Mood normal.         Behavior: Behavior normal.         Thought Content: Thought content normal.       Significant Labs: BMP:   Recent Labs   Lab 10/22/22  0545 10/22/22  2001 10/23/22  0237   GLU 67* 81 94    137 139   K 3.2* 4.0 4.0   CL 96 100 103   CO2 30* 19* 25   BUN 12 8 10   CREATININE 3.6* 2.8* 3.0*   CALCIUM 9.0 9.5 9.0   MG 1.9  --  2.0   , CMP   Recent Labs   Lab 10/22/22  0545 10/22/22  2001 10/23/22  0237    137 139   K 3.2* 4.0 4.0   CL 96 100 103   CO2 30* 19* 25   GLU 67* 81 94   BUN 12 8 10   CREATININE 3.6* 2.8* 3.0*   CALCIUM 9.0 9.5 9.0   PROT 7.3 8.2 6.8   ALBUMIN 2.9* 3.2* 2.7*   BILITOT 1.5* 1.9* 1.4*   ALKPHOS 78 86 74   AST 22 23 20   ALT 6* 9* 7*   ANIONGAP 15 18* 11   , CBC   Recent Labs   Lab 10/22/22  0545 10/22/22  1849 10/23/22  0237   WBC 3.61* 3.37* 3.46*   HGB 8.5* 9.2* 8.0*   HCT 27.5* 30.9* 25.6*   * 154 89*   , and INR   Recent Labs   Lab 10/23/22  0257   INR 1.9*     Assessment and Plan:     * Hypotension  Refer to 'acute on chronic systolic HF'    Paroxysmal atrial fibrillation  ECG with atrial fibrillation. Patient reports he previous hx of atrial fib. Afib from OSH was documented as of early this month.   CHADSVASc 3.     - Anticoagulation held in previous admission given hx of significant aneurysmal dilatation of the thoracic aorta. Continue to hold.  - Ensure to monitor and correct  electrolytes    Acute on chronic systolic heart failure  Nayan Hernandez is a 62 yo M hx of  HCV, HFrEF ( EF 10% w/ MR + TR), pHTN, HTN, HLD, ESRD MWF (within the past month) with permacath in right groin, pAF however not on AC, chronic Type B dissection s/p intervention 2011, non-compliance (poor follow up, missed HD sessions) admitted for hypotension to hospital medicine,  that has now improved with short course of IVF. The patient was then transfered to the CICU for concern of cargioneck shock due to concern for somnolence, with rising lactic acidosis with change in clinical status.     HDS on     SVO2: 70  CVP: 27  CO: 6.8  CI: 3.6  SVR: 537  Inotropes/ Vasopressors:  @ 5.    Plan  - Continue  @ 5. HR prior to  and on  90-110s. Closely monitor HR  - Will request for volume removal today with nephrology   - Hold midodrine for now  - MAP >65 goal       Ascending aortic dissection  Patient with known chronic dissection involving the aortic arch and descending aorta. CTA this admit demonstrated below:   The CTA C/A/P demonstrated aortic dissection extending to the right common iliac and left proximal internal iliac artery with significant aneurysmal dilatation of the thoracic aorta and dissection flap involves the proximal right subclavian artery and also left renal artery.    - CTS evalauted the patient and deemed him a non-surgical candidate  - Monitor BP and avoid hypertensive episodes.     ESRD (end stage renal disease)  - Last HD session was 10/22 with removal of 3L      VTE Risk Mitigation (From admission, onward)         Ordered     IP VTE HIGH RISK PATIENT  Once         10/21/22 2237     Place sequential compression device  Until discontinued         10/21/22 2237                Carmen Araiza MD  Cardiology   [facility] - [facility]

## 2022-10-23 NOTE — SUBJECTIVE & OBJECTIVE
Interval History: had HD yesterday net neg 2L. Stepped up to cardiac ICU for cardiogenic shock concerns. Patient on dobutamine not . Says SOB better CVP 22     Review of patient's allergies indicates:  No Known Allergies  Current Facility-Administered Medications   Medication Frequency    0.9%  NaCl infusion Continuous    albuterol inhaler 2 puff Q6H PRN    aspirin chewable tablet 81 mg Daily    atorvastatin tablet 40 mg Daily    cefepime in dextrose 5 % IVPB 2 g Q24H    dextrose 10% bolus 125 mL PRN    dextrose 10% bolus 250 mL PRN    DOBUtamine 500 mg in D5W 125 mL infusion Continuous    glucagon (human recombinant) injection 1 mg PRN    glucose chewable tablet 16 g PRN    glucose chewable tablet 24 g PRN    hydrOXYzine HCL tablet 25 mg TID PRN    naloxone 0.4 mg/mL injection 0.02 mg PRN    sodium chloride 0.9% flush 10 mL Q12H PRN    vancomycin - pharmacy to dose pharmacy to manage frequency       Objective:     Vital Signs (Most Recent):  Temp: 97.5 °F (36.4 °C) (10/23/22 0500)  Pulse: 108 (10/23/22 1005)  Resp: (!) 28 (10/23/22 1005)  BP: 95/60 (10/23/22 1005)  SpO2: 100 % (10/23/22 1005)  O2 Device (Oxygen Therapy): nasal cannula (10/23/22 1005)   Vital Signs (24h Range):  Temp:  [96.6 °F (35.9 °C)-97.9 °F (36.6 °C)] 97.5 °F (36.4 °C)  Pulse:  [] 108  Resp:  [15-46] 28  SpO2:  [94 %-100 %] 100 %  BP: ()/(56-79) 95/60     Weight: 75.5 kg (166 lb 7.2 oz) (10/23/22 0404)  Body mass index is 26.06 kg/m².  Body surface area is 1.89 meters squared.    I/O last 3 completed shifts:  In: 1185 [P.O.:272; I.V.:313; Other:300; IV Piggyback:300]  Out: 3180 [Urine:30; Other:3150]    Physical Exam  Vitals reviewed.   Constitutional:       Appearance: He is ill-appearing.   HENT:      Head: Atraumatic.      Mouth/Throat:      Mouth: Mucous membranes are moist.   Eyes:      Conjunctiva/sclera: Conjunctivae normal.      Pupils: Pupils are equal, round, and reactive to light.   Neck:      Comments:  +JVD  Cardiovascular:      Rate and Rhythm: Normal rate.      Pulses: Normal pulses.      Heart sounds: Normal heart sounds.   Pulmonary:      Comments: Decreased air entry at bases   Abdominal:      General: Bowel sounds are normal.      Palpations: Abdomen is soft.   Musculoskeletal:         General: Normal range of motion.      Right lower leg: Edema present.      Left lower leg: Edema present.   Skin:     General: Skin is warm.      Capillary Refill: Capillary refill takes less than 2 seconds.   Neurological:      General: No focal deficit present.      Mental Status: He is alert and oriented to person, place, and time.       Significant Labs:  CBC:   Recent Labs   Lab 10/23/22  0237   WBC 3.46*   RBC 2.81*   HGB 8.0*   HCT 25.6*   PLT 89*   MCV 91   MCH 28.5   MCHC 31.3*     CMP:   Recent Labs   Lab 10/23/22  0237   GLU 94   CALCIUM 9.0   ALBUMIN 2.7*   PROT 6.8      K 4.0   CO2 25      BUN 10   CREATININE 3.0*   ALKPHOS 74   ALT 7*   AST 20   BILITOT 1.4*        Significant Imaging:  Reviewed

## 2022-10-23 NOTE — ASSESSMENT & PLAN NOTE
Nayan Hernandez is a 62 yo M hx of  HCV, HFrEF ( EF 10% w/ MR + TR), pHTN, HTN, HLD, ESRD MWF (within the past month) with permacath in right groin, pAF however not on AC, chronic Type B dissection s/p intervention 2011, non-compliance (poor follow up, missed HD sessions) admitted for hypotension to hospital medicine,  that has now improved with short course of IVF. The patient was then transfered to the CICU for concern of cargioneck shock due to concern for somnolence, with rising lactic acidosis with change in clinical status.     HDS on     SVO2: 70  CVP: 27  CO: 6.8  CI: 3.6  SVR: 537  Inotropes/ Vasopressors:  @ 5.    Plan  - Continue  @ 5. HR prior to  and on  90-110s. Closely monitor HR  - Will request for volume removal today with nephrology   - Hold midodrine for now  - MAP >65 goal

## 2022-10-23 NOTE — PROGRESS NOTES
[facility] - Cardiac Intensive Care  Nephrology  Progress Note    Patient Name: Nayan Hernandez  MRN: [id number]  Admission Date: 10/21/2022  Hospital Length of Stay: 2 days  Attending Provider: Henry Allred MD   Primary Care Physician: Eusebia Brady NP  Principal Problem:Hypotension    Subjective:     HPI: 66 yo M PMHx of hepatitis C, CHF with 10%EF, CAD, moderate mitral valve regurgitation, moderate tricuspid valve regurgitation, HTN, HLD, pulmonary HTN, end-stage renal disease on HD MWF (last 5 months) who was admitted on 10/21 for hypotension at the dialsis clinic. CXR revealed an enlarging thoracic aneurysm from previous.      Nephrology consulted for ESRD with HD needs    ESRD on hemodialysis  Outpatient HD Information:  -Dialysis modality: Hemodialysis  -Outpatient HD unit: [facility]   -Nephrologist: Dr. Childs  -HD TX days: Monday/Friday, duration of treatment: unk  -Last HD TX prior to hospital admission: 09/29/2022  -Dialysis access: dialysis catheter placed on R femoral   -Residual urine: oliguric   -EDW: ?      Interval History: had HD yesterday net neg 2L. Stepped up to cardiac ICU for cardiogenic shock concerns. Patient on dobutamine not . Says SOB better CVP 22     Review of patient's allergies indicates:  No Known Allergies  Current Facility-Administered Medications   Medication Frequency    0.9%  NaCl infusion Continuous    albuterol inhaler 2 puff Q6H PRN    aspirin chewable tablet 81 mg Daily    atorvastatin tablet 40 mg Daily    cefepime in dextrose 5 % IVPB 2 g Q24H    dextrose 10% bolus 125 mL PRN    dextrose 10% bolus 250 mL PRN    DOBUtamine 500 mg in D5W 125 mL infusion Continuous    glucagon (human recombinant) injection 1 mg PRN    glucose chewable tablet 16 g PRN    glucose chewable tablet 24 g PRN    hydrOXYzine HCL tablet 25 mg TID PRN    naloxone 0.4 mg/mL injection 0.02 mg PRN    sodium chloride 0.9% flush 10 mL Q12H PRN    vancomycin - pharmacy to dose  pharmacy to manage frequency       Objective:     Vital Signs (Most Recent):  Temp: 97.5 °F (36.4 °C) (10/23/22 0500)  Pulse: 108 (10/23/22 1005)  Resp: (!) 28 (10/23/22 1005)  BP: 95/60 (10/23/22 1005)  SpO2: 100 % (10/23/22 1005)  O2 Device (Oxygen Therapy): nasal cannula (10/23/22 1005)   Vital Signs (24h Range):  Temp:  [96.6 °F (35.9 °C)-97.9 °F (36.6 °C)] 97.5 °F (36.4 °C)  Pulse:  [] 108  Resp:  [15-46] 28  SpO2:  [94 %-100 %] 100 %  BP: ()/(56-79) 95/60     Weight: 75.5 kg (166 lb 7.2 oz) (10/23/22 0404)  Body mass index is 26.06 kg/m².  Body surface area is 1.89 meters squared.    I/O last 3 completed shifts:  In: 1185 [P.O.:272; I.V.:313; Other:300; IV Piggyback:300]  Out: 3180 [Urine:30; Other:3150]    Physical Exam  Vitals reviewed.   Constitutional:       Appearance: He is ill-appearing.   HENT:      Head: Atraumatic.      Mouth/Throat:      Mouth: Mucous membranes are moist.   Eyes:      Conjunctiva/sclera: Conjunctivae normal.      Pupils: Pupils are equal, round, and reactive to light.   Neck:      Comments: +JVD  Cardiovascular:      Rate and Rhythm: Normal rate.      Pulses: Normal pulses.      Heart sounds: Normal heart sounds.   Pulmonary:      Comments: Decreased air entry at bases   Abdominal:      General: Bowel sounds are normal.      Palpations: Abdomen is soft.   Musculoskeletal:         General: Normal range of motion.      Right lower leg: Edema present.      Left lower leg: Edema present.   Skin:     General: Skin is warm.      Capillary Refill: Capillary refill takes less than 2 seconds.   Neurological:      General: No focal deficit present.      Mental Status: He is alert and oriented to person, place, and time.       Significant Labs:  CBC:   Recent Labs   Lab 10/23/22  0237   WBC 3.46*   RBC 2.81*   HGB 8.0*   HCT 25.6*   PLT 89*   MCV 91   MCH 28.5   MCHC 31.3*     CMP:   Recent Labs   Lab 10/23/22  0237   GLU 94   CALCIUM 9.0   ALBUMIN 2.7*   PROT 6.8      K 4.0    CO2 25      BUN 10   CREATININE 3.0*   ALKPHOS 74   ALT 7*   AST 20   BILITOT 1.4*        Significant Imaging:  Reviewed     Assessment/Plan:     Paroxysmal atrial fibrillation  Per primary     Lactic acidosis  Resolved down 2.2 today   Management per primary     ESRD (end stage renal disease)  Outpatient HD Information:  -Dialysis modality: Hemodialysis  -Outpatient HD unit: [facility]   -Nephrologist: Dr. Childs  -HD TX days: Monday/Friday, duration of treatment: unk  -Last HD TX prior to hospital admission: 09/29/2022  -Dialysis access: dialysis catheter placed on R femoral   -Residual urine: oliguric   Admitted for hypotension and found to have enlarging thoracic aneurysm from previous      Recommendations  Last HD was yesterday with net neg 2L   Cardiogenic shock on dobutamine   CVP 22. On one liter NC  Need volume removal   Will do SCUFF for 12 hours followed by [facility]. Will aim for neg 2-2.5 L in next 24 hours                   Rick Krause MD  Nephrology  [facility] - Cardiac Intensive Care

## 2022-10-23 NOTE — SUBJECTIVE & OBJECTIVE
Past Medical History:   Diagnosis Date    Aortic dissection     Juan Carlos Type A     Cardiac defibrillator in place     CHF (congestive heart failure) 12/15/14    EF 15-20%    Coronary artery disease     ETOH abuse     Hepatitis C antibody test positive     Hypertension     S/P aortic dissection repair 2011       Past Surgical History:   Procedure Laterality Date    CORONARY ARTERY BYPASS GRAFT         Review of patient's allergies indicates:  No Known Allergies    No current facility-administered medications on file prior to encounter.     Current Outpatient Medications on File Prior to Encounter   Medication Sig    carvedilol (COREG) 3.125 MG tablet Take 1 tablet (3.125 mg total) by mouth 2 (two) times daily.    furosemide (LASIX) 40 MG tablet Take one pill twice a day. Take one additional dose if weight increases more than 2 lbs in one day.    lisinopril (PRINIVIL,ZESTRIL) 2.5 MG tablet Take 1 tablet (2.5 mg total) by mouth once daily.    pravastatin (PRAVACHOL) 40 MG tablet Take 1 tablet (40 mg total) by mouth once daily.    spironolactone (ALDACTONE) 25 MG tablet Take 1 tablet (25 mg total) by mouth once daily.     Family History    None       Tobacco Use    Smoking status: Every Day    Smokeless tobacco: Never   Substance and Sexual Activity    Alcohol use: Yes     Alcohol/week: 0.0 standard drinks     Types: 1 - 2 drink(s) per week    Drug use: Not on file    Sexual activity: Not on file     Review of Systems   Constitutional: Negative for chills, decreased appetite and fever.   Cardiovascular:  Positive for dyspnea on exertion and orthopnea. Negative for chest pain, irregular heartbeat, near-syncope, palpitations and syncope.   Respiratory:  Negative for shortness of breath.    Endocrine: Negative.    Gastrointestinal: Negative.    Genitourinary: Negative.    Neurological: Negative.    Psychiatric/Behavioral: Negative.     Objective:     Vital Signs (Most Recent):  Temp: 97.4 °F (36.3 °C) (10/23/22  0300)  Pulse: 104 (10/23/22 0439)  Resp: (!) 46 (10/23/22 0439)  BP: 100/64 (10/23/22 0400)  SpO2: 99 % (10/23/22 0439)   Vital Signs (24h Range):  Temp:  [96.6 °F (35.9 °C)-98.1 °F (36.7 °C)] 97.4 °F (36.3 °C)  Pulse:  [] 104  Resp:  [15-46] 46  SpO2:  [94 %-100 %] 99 %  BP: ()/(56-79) 100/64     Weight: 75.5 kg (166 lb 7.2 oz)  Body mass index is 26.06 kg/m².    SpO2: 99 %  O2 Device (Oxygen Therapy): nasal cannula      Intake/Output Summary (Last 24 hours) at 10/23/2022 0442  Last data filed at 10/23/2022 0300  Gross per 24 hour   Intake 1165.6 ml   Output 3180 ml   Net -2014.4 ml       Lines/Drains/Airways       Central Venous Catheter Line  Duration                  Hemodialysis Catheter right femoral -- days    Percutaneous Central Line Insertion/Assessment - Triple Lumen  10/23/22 0106 right internal jugular <1 day              Peripheral Intravenous Line  Duration                  Peripheral IV - Single Lumen 10/21/22 1827 20 G Left Antecubital 1 day         Peripheral IV - Single Lumen 10/22/22 2206 22 G Anterior;Distal;Left Forearm <1 day                    Physical Exam  Constitutional:       General: He is not in acute distress.     Appearance: Normal appearance. He is normal weight. He is not ill-appearing.   HENT:      Head: Normocephalic.      Mouth/Throat:      Mouth: Mucous membranes are moist.   Eyes:      Extraocular Movements: Extraocular movements intact.   Neck:      Vascular: JVD present.   Cardiovascular:      Rate and Rhythm: Normal rate and regular rhythm.      Pulses: Normal pulses.   Pulmonary:      Effort: Pulmonary effort is normal.      Comments: Decreased breath sounds bilaterally  Abdominal:      General: Abdomen is flat. Bowel sounds are normal.   Musculoskeletal:         General: No swelling (2+ bilateral pitting edema). Normal range of motion.      Cervical back: Normal range of motion.   Skin:     General: Skin is warm.      Coloration: Skin is not jaundiced.    Neurological:      Mental Status: He is alert and oriented to person, place, and time. Mental status is at baseline.   Psychiatric:         Mood and Affect: Mood normal.         Behavior: Behavior normal.         Thought Content: Thought content normal.       Significant Labs: BMP:   Recent Labs   Lab 10/22/22  0545 10/22/22  2001 10/23/22  0237   GLU 67* 81 94    137 139   K 3.2* 4.0 4.0   CL 96 100 103   CO2 30* 19* 25   BUN 12 8 10   CREATININE 3.6* 2.8* 3.0*   CALCIUM 9.0 9.5 9.0   MG 1.9  --  2.0   , CMP   Recent Labs   Lab 10/22/22  0545 10/22/22  2001 10/23/22  0237    137 139   K 3.2* 4.0 4.0   CL 96 100 103   CO2 30* 19* 25   GLU 67* 81 94   BUN 12 8 10   CREATININE 3.6* 2.8* 3.0*   CALCIUM 9.0 9.5 9.0   PROT 7.3 8.2 6.8   ALBUMIN 2.9* 3.2* 2.7*   BILITOT 1.5* 1.9* 1.4*   ALKPHOS 78 86 74   AST 22 23 20   ALT 6* 9* 7*   ANIONGAP 15 18* 11   , CBC   Recent Labs   Lab 10/22/22  0545 10/22/22  1849 10/23/22  0237   WBC 3.61* 3.37* 3.46*   HGB 8.5* 9.2* 8.0*   HCT 27.5* 30.9* 25.6*   * 154 89*   , and INR   Recent Labs   Lab 10/23/22  0257   INR 1.9*

## 2022-10-23 NOTE — PROGRESS NOTES
Dr. Patton & Dr. Araiza notified of a critical lab - Lactic Acid of 5.0. MD mentions monitoring other labs and the possibility of pt going to ICU.

## 2022-10-23 NOTE — PLAN OF CARE
Hemodynamics on  5:    CVP 28  SVO2 43  CO 4.80  CI 2.54      On CRRT.       Plan: continue current management.                 Alma Umanzor MD  Cardiology fellow  CC[facility]

## 2022-10-23 NOTE — PROGRESS NOTES
New start  CRRT . BFR 1560/,  uf initial goal set at 300. B/P 104/72, mean 83. Pulse 105, o2 sat 100%. Patient alert and stable

## 2022-10-23 NOTE — CARE UPDATE
Called to the bedside at 1800 for new onset chest pain after patient received dialysis. Pain described as crushing left upper chest, made better when bearing down. On exam, patient uncomfortable secondary to pain. 0.5 dilaudid given, repeat CXR equivocal to previous. Patient hemodynamically stable BP of 108/72, HR in the 100s. Repeat EKG unchanged from previous. Lactic acid increasing to 5 from 3.3, troponin 0.065. Called CTS, no interventions indicated due to patients co-morbidities. Called cardiology due to concern for cardiogenic shock secondary to increased O2 demand as well as up-trending lactic acid. Spoke with Dr. Katz, who will initiate transfer to CCU.

## 2022-10-23 NOTE — NURSING TRANSFER
Nursing Transfer Note      10/23/2022     Reason patient is being transferred: Chest pain and high lactic acid    Transfer From: CSU    Transfer via bed    Transfer with O2, cardiac monitoring    Transported by bed    Medicines sent: yes    Any special needs or follow-up needed: close monitoring    Chart send with patient: Yes    Notified: daughter    Patient reassessed at 10/22/22 at 2145    Upon arrival to floor: cardiac monitor applied, patient oriented to room, call bell in reach, and bed in lowest position

## 2022-10-23 NOTE — PROGRESS NOTES
Pharmacist Renal Dose Adjustment Note    Nayan Hernandez is a 67 y.o. male being treated with cefepime    Patient Data:  Recent Labs   Lab 10/21/22  1758 10/22/22  0545   CREATININE 3.7* 3.6*     Serum creatinine: 3.6 mg/dL (H) 10/22/22 0545  Estimated creatinine clearance: 18.1 mL/min (A)    Cefepime 2 g q12h is being changed to q24h since this patient has ESRD and is on iHD F    Pharmacist's Name: Natalie Luna  Pharmacist's Extension: [phone]

## 2022-10-23 NOTE — EICU
Rounding (Video Assessment):  Yes    Intervention Initiated From:  Bedside    Irina Communicated with Bedside Nurse regarding:  Time-Out    Called into room for central line time out.  placed TLC, See flowsheet.

## 2022-10-24 PROBLEM — Z51.5 PALLIATIVE CARE ENCOUNTER: Status: ACTIVE | Noted: 2022-01-01

## 2022-10-24 PROBLEM — I71.21 ASCENDING AORTIC ANEURYSM: Status: ACTIVE | Noted: 2022-01-01

## 2022-10-24 PROBLEM — I95.9 HYPOTENSION: Status: ACTIVE | Noted: 2022-01-01

## 2022-10-24 PROBLEM — Z71.89 GOALS OF CARE, COUNSELING/DISCUSSION: Status: ACTIVE | Noted: 2022-01-01

## 2022-10-24 PROBLEM — Z71.89 ADVANCED CARE PLANNING/COUNSELING DISCUSSION: Status: ACTIVE | Noted: 2022-01-01

## 2022-10-24 NOTE — ASSESSMENT & PLAN NOTE
Palliative care consulted for goals of care and advanced care planning for Mr. 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  Initially admitted to hospital medicine with hypotension and transitioned to CICU with concerns for cardiogenic shock. Dobutamine continuous infusion and CRRT in progress.  Altered mental status and  At time of this encounter is sedated with precedex.  Soft wrist restraints  In use  to prevent pulling lines.  Appears comfortable no facial grimacing or moaning.  Supplemental oxygen 2 liters NC with sats 97 - 100%       Advance Care Planning     - Mr. Hernandez unable to participate in ACP conversation   - HPOA has been received. Daughter Kate Dickey [phone] is designated decision maker.   - Resuscitation status - full code per primary team     Goals of Care     - Palliative care MICHAEL and HARDIK Olson met a patient's bedside.  Mr. Hernandez unable to participate in conversation and no family is present.   - Pal care APRN attempted to reach daughter at the above number with no success.  Message left and at this time gilbert Love has not returned the call.   - Mr. Hernandez is critically ill and has limited treatment options.  As he is not able to make medical decisions, recommend interdisciplinary family meeting with daughter, cardiology and pal care.   - CPR would not be medically beneficial and palliative care would not recommend CPR in this frail gentleman.  Will discuss with family.    - Until the family is available- primary team will continue current plan of care.    - Anticipating family meeting as soon as possible        Hypotension/ Chronic aortic dissection  /Cardiogenic shock   - Managed per primary team and specialty consultants   - CTS evaluated and found to have no surgical interventions  - continue current plan of care    - Palliative care following for goals of care  -      ESRD  - managed  per primary team and specialty consultants   - CRRT in progress   - continue current plan of care   - pal care following for goals of care -      Symptom Management    Pain:   - appears to be comfortable   - no behavioral symptoms of pain    - continue current plan of care       Dyspnea   Supplemental oxygen  - 2 liters nasal cannula   - sats  97% - 100 %     Nausea   - no c/o nausea       Plan/Recommendations      - continue current plan of care   - pal care does not recommend CPR   - anticipate family meeting - 2 PM 10/25/22 if family is available   - if able to be discharged - hospice appropriate     Primary team aware of the above goals of care and recommendations.  Thank you for consult and opportunity to participate in patient's care       .

## 2022-10-24 NOTE — CONSULTS
[facility] - Cardiac Intensive Care  Palliative Medicine  Consult Note    Patient Name: Nayan Hernandez  MRN: [id number]  Admission Date: 10/21/2022  Hospital Length of Stay: 3 days  Code Status: Full Code   Attending Provider: Zeny Grullon MD  Consulting Provider: HOLDEN Fink  Primary Care Physician: Eusebia Brady NP  Principal Problem:Cardiogenic shock    Patient information was obtained from relative(s), past medical records and primary team.      Inpatient consult to Palliative Care  Consult performed by: HOLDEN Pereyra  Consult ordered by: Vinnie Soni MD  Reason for consult: goals of care and advanced care planning      Assessment/Plan:     Palliative care encounter  Palliative care consulted for goals of care and advanced care planning for Mr. 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  Initially admitted to hospital medicine with hypotension and transitioned to CICU with concerns for cardiogenic shock. Dobutamine continuous infusion and CRRT in progress.  Altered mental status and  At time of this encounter is sedated with precedex.  Soft wrist restraints  In use  to prevent pulling lines.  Appears comfortable no facial grimacing or moaning.  Supplemental oxygen 2 liters NC with sats 97 - 100%       Advance Care Planning     - Mr. Hernandez unable to participate in ACP conversation   - HPOA has been received. Gilbert Dickey [phone] is designated decision maker.   - Resuscitation status - full code per primary team     Goals of Care     - Palliative care APRN and HARDIK Olson met a patient's bedside.  Mr. Hernandez unable to participate in conversation and no family is present.   - Pal care APRN attempted to reach daughter at the above number with no success.  Message left and at this time gilbert Lvoe has not returned the call.   - Mr. Hernandez is critically ill and has limited  treatment options.  As he is not able to make medical decisions, recommend interdisciplinary family meeting with daughter, cardiology and pal care.   - CPR would not be medically beneficial and palliative care would not recommend CPR in this frail gentleman.  Will discuss with family.    - Until the family is available- primary team will continue current plan of care.    - Anticipating family meeting as soon as possible       Hypotension/ Chronic aortic dissection  /Cardiogenic shock   - Managed per primary team and specialty consultants   - CTS evaluated and found to have no surgical interventions  - continue current plan of care    - Palliative care following for goals of care  -      ESRD  - managed per primary team and specialty consultants   - CRRT in progress   - continue current plan of care   - pal care following for goals of care -      Symptom Management    Pain:   - appears to be comfortable   - no behavioral symptoms of pain    - continue current plan of care       Dyspnea   Supplemental oxygen  - 2 liters nasal cannula   - sats  97% - 100 %     Nausea   - no c/o nausea       Plan/Recommendations      - continue current plan of care   - pal care does not recommend CPR   - anticipate family meeting - 2 PM 10/25/22 if family is available   - if able to be discharged - hospice appropriate     Primary team aware of the above goals of care and recommendations.  Thank you for consult and opportunity to participate in patient's care       .        Thank you for your consult. I will follow-up with patient. Please contact us if you have any additional questions.    Subjective:     HPI:   HPI obtained from chart review:     Mr. Hernandez is a 64 yo gentleman with PMH of:  HCV, HFrEF 10%-15% w/ MR + TR, pHTN, HTN, HLD, ESRD MWF (last 5 months) with permacath in right groin, pAF not on AC, chronic Type B dissection s/p intervention.     Presented to ED from [facility] clinic  hypotension and inability to tolerate HD.  BP  "96/58, mentation intact.  Reports  shortness of breath,  and  new scrotal edema.     Reports he will feel better when t"he gets fluid off".  No c/o  chest pain, abdominal pain, headaches, vision changes.  Reports able to make urine and his has become more difficult - straining. No c/o fevers, chills, N/V/D. He is making stool appropriately.     ED   Imagining   CXR -  concerning  for widening of mediastinum.   CT of chest abdomen and pelvis: indicated ascending  and descending aortic dissection    CT surgery consulted  -  patient with  chronic ascending and descending dissection. With no surgical intervention Critical care consulted  or hypoxia  and found to be stable for the floor after evaluation given patient with normal SpO2 on NC.     Nephrology consulted for dialysis needs. Recommended 500 mL bolus for hypotension. Found to have Lactic acid 2.8.     Patient admitted to hospital medicine for volume overload, hypotension, and dialysis needs.  Experienced increasing lactic acidosis - cardiology consulted and patient transferred to CICU  for closer monitoring.      Patient is not known to palliative care - consulted for goals of care and advanced care planning.                            Hospital Course:  No notes on file    Interval History:     Past Medical History:   Diagnosis Date    Aortic dissection     [place] Type A     Cardiac defibrillator in place     CHF (congestive heart failure) 12/15/14    EF 15-20%    Coronary artery disease     ETOH abuse     Hepatitis C antibody test positive     Hypertension     S/P aortic dissection repair 2011       Past Surgical History:   Procedure Laterality Date    CORONARY ARTERY BYPASS GRAFT         Review of patient's allergies indicates:  No Known Allergies    Medications:  Continuous Infusions:   sodium chloride 0.9%      sodium chloride 0.9% 5 mL/hr at 10/24/22 1000    calcium gluconate infusion (CRRT)      dextrose-sod citrate-citric ac      DOBUTamine IV infusion " (non-titrating) 5 mcg/kg/min (10/24/22 1000)     Scheduled Meds:   aspirin  81 mg Oral Daily    atorvastatin  40 mg Oral Daily    ceFEPime (MAXIPIME) IVPB  2 g Intravenous Q12H    vancomycin (VANCOCIN) IVPB  15 mg/kg (Dosing Weight) Intravenous Once     PRN Meds:albuterol, dextrose 10%, glucose, hydrOXYzine, LORazepam, magnesium sulfate IVPB, naloxone, sodium chloride 0.9%, sodium phosphate IVPB, sodium phosphate IVPB, sodium phosphate IVPB, Pharmacy to dose Vancomycin consult **AND** vancomycin - pharmacy to dose    Family History    No significant family history reported        Tobacco Use    Smoking status: Every Day    Smokeless tobacco: Never   Substance and Sexual Activity    Alcohol use: Yes     Alcohol/week: 0.0 standard drinks     Types: 1 - 2 drink(s) per week    Drug use: Not on file    Sexual activity: Not on file       Review of Systems   Unable to perform ROS: Mental status change   Objective:     Vital Signs (Most Recent):  Temp: 97.4 °F (36.3 °C) (10/24/22 0700)  Pulse: 96 (10/24/22 1000)  Resp: (!) 27 (10/24/22 1000)  BP: 103/60 (10/24/22 1000)  SpO2: 99 % (10/24/22 1000)   Vital Signs (24h Range):  Temp:  [97.3 °F (36.3 °C)-98.3 °F (36.8 °C)] 97.4 °F (36.3 °C)  Pulse:  [] 96  Resp:  [19-41] 27  SpO2:  [94 %-100 %] 99 %  BP: ()/(60-86) 103/60     Weight: 75.5 kg (166 lb 7.2 oz)  Body mass index is 26.06 kg/m².    Physical Exam  Vitals and nursing note reviewed.   Constitutional:       Appearance: He is ill-appearing.      Comments: sedated   HENT:      Head: Normocephalic and atraumatic.      Right Ear: External ear normal.      Left Ear: External ear normal.      Nose: Nose normal.      Mouth/Throat:      Mouth: Mucous membranes are moist.   Eyes:      Conjunctiva/sclera: Conjunctivae normal.   Neck:      Comments: Right IJ triple lumen catheter  Cardiovascular:      Rate and Rhythm: Normal rate and regular rhythm.      Pulses: Normal pulses.      Heart sounds: Normal heart sounds.    Pulmonary:      Effort: No respiratory distress.      Comments: Supplemental oxygen   Abdominal:      General: Bowel sounds are normal.      Tenderness: There is no abdominal tenderness.   Genitourinary:     Comments: CRRT in progress,  scrotal edema   Musculoskeletal:      Cervical back: Normal range of motion.      Right lower leg: Edema present.      Left lower leg: No edema.   Skin:     General: Skin is warm and dry.   Neurological:      Mental Status: He is disoriented.      Motor: Weakness present.      Comments: Sedated    Psychiatric:      Comments: Disoriented, Sedated and in soft wrist restraints to prevent pulling lines  Unable to assess thought content and judgement        Review of Symptoms      Symptom Assessment (ESAS 0-10 Scale)  Unable to complete assessment due to Mental status change     Cam / Delirium: unable to participate in exam.  Constipation:  Negative  Diarrhea:  Negative      Bowel Management Plan (BMP):  No      Pain Assessment:  OME in 24 hours:  0  Location(s):      Pain Assessment in Advanced Demential Scale (PAINAD)   Breathing - Independent of vocalization:  0  Negative vocalization:  0  Facial expression:  0  Body language:  0  Consolability:  0  Total:  0    DEREK Scale (Modified): patient unable to participate in conversation.    Performance Status:  20    Living Arrangements:  Lives alone    Psychosocial/Cultural: Lives alone, recently released from residential system, retired from [employer]. Not , 5 adult children, 3 daughters and 2 sons, enjoys watching television.  Family support from daughter Kate.     Spiritual:  F - Qiana and Belief:  Not addressed, patient unable to participate in conversation, no family present   A - Address in Care:  Not addressed at this time       Advance Care Planning   Advance Directives:   Living Will: No        Oral Declaration: No    LaPOST: No    Do Not Resuscitate Status: No    Medical Power of : Yes        Oral  Declaration: No    Agent's Name:  Kate Dickey   Agent's Contact Number:  [phone]    Decision Making:  Family answered questions  Goals of Care: See assessment and plan        Significant Labs: All pertinent labs within the past 24 hours have been reviewed.  CBC:   Recent Labs   Lab 10/24/22  0403   WBC 3.18*   HGB 7.4*   HCT 23.3*   MCV 90   PLT 95*     BMP:  Recent Labs   Lab 10/24/22  0403   GLU 86  86     139   K 4.0  4.0     107   CO2 21*  21*   BUN 7*  7*   CREATININE 2.2*  2.2*   CALCIUM 8.7  8.7   MG 1.8  1.8     LFT:  Lab Results   Component Value Date    AST 23 10/24/2022    ALKPHOS 66 10/24/2022    BILITOT 1.6 (H) 10/24/2022     Albumin:   Albumin   Date Value Ref Range Status   10/24/2022 2.7 (L) 3.5 - 5.2 g/dL Final   10/24/2022 2.7 (L) 3.5 - 5.2 g/dL Final     Protein:   Total Protein   Date Value Ref Range Status   10/24/2022 6.7 6.0 - 8.4 g/dL Final     Lactic acid:   Lab Results   Component Value Date    LACTATE 2.2 10/23/2022    LACTATE 5.0 (HH) 10/22/2022       Significant Imaging: I have reviewed all pertinent imaging results/findings within the past 24 hours.        Additional 20 mins spent in advanced care planning .    Kristel Watson, CNS  Palliative Medicine  [facility] - Cardiac Intensive Care

## 2022-10-24 NOTE — PROGRESS NOTES
Pharmacokinetic Assessment Follow Up: IV Vancomycin    Vancomycin serum concentration assessment(s):    The random level was drawn correctly and can be used to guide therapy at this time. The measurement is below the desired definitive target range of 15 to 20 mcg/mL.  Patient was ordered 12 hours of SLED and 12 hours of SCUF     Vancomycin Regimen Plan:    Redose vancomycin 1250mg (15mg/kg) x 1 dose  Redraw 12 hour post dose level today at 22:00h to assess clearance and redose per level and RRT plans     Drug levels (last 3 results):  Recent Labs   Lab Result Units 10/24/22  0403   Vancomycin, Random ug/mL 12.2       Pharmacy will continue to follow and monitor vancomycin.    Please contact pharmacy at extension [phone] for questions regarding this assessment.    Thank you for the consult,   Trupti Ba, PharmD, BCPS, [facility] Clinical Pharmacy Specialist  EXT [phone]       Patient brief summary:  Nayan Hernandez is a 67 y.o. male initiated on antimicrobial therapy with IV Vancomycin for treatment of bacteremia    The patient's current regimen is pulse dosing     Drug Allergies:   Review of patient's allergies indicates:  No Known Allergies    Actual Body Weight:   75.5kg    Renal Function:   Estimated Creatinine Clearance: 29.6 mL/min (A) (based on SCr of 2.2 mg/dL (H)).,     Dialysis Method (if applicable):  N/A    CBC (last 72 hours):  Recent Labs   Lab Result Units 10/21/22  1758 10/22/22  0545 10/22/22  1849 10/23/22  0237 10/24/22  0403   WBC K/uL 3.70* 3.61* 3.37* 3.46* 3.18*   Hemoglobin g/dL 8.6* 8.5* 9.2* 8.0* 7.4*   Hematocrit % 27.3* 27.5* 30.9* 25.6* 23.3*   Platelets K/uL 139* 137* 154 89* 95*   Gran % % 64.8 66.7 64.6 66.7 71.7   Lymph % % 18.4 17.7* 19.9 16.8* 14.5*   Mono % % 13.8 13.0 13.1 14.2 11.9   Eosinophil % % 2.2 1.7 1.8 1.7 1.3   Basophil % % 0.5 0.6 0.3 0.3 0.3   Differential Method  Automated Automated Automated Automated Automated       Metabolic Panel (last 72  hours):  Recent Labs   Lab Result Units 10/21/22  1758 10/22/22  0545 10/22/22  2001 10/23/22  0237 10/24/22  0001 10/24/22  0403   Sodium mmol/L 139 141 137 139 136 139  139   Potassium mmol/L 2.9* 3.2* 4.0 4.0 4.0 4.0  4.0   Chloride mmol/L 95 96 100 103 104 107  107   CO2 mmol/L 30* 30* 19* 25 23 21*  21*   Glucose mg/dL 84 67* 81 94 87 86  86   BUN mg/dL 10 12 8 10 8 7*  7*   Creatinine mg/dL 3.7* 3.6* 2.8* 3.0* 2.6* 2.2*  2.2*   Albumin g/dL 3.0* 2.9* 3.2* 2.7* 2.7* 2.7*  2.7*   Total Bilirubin mg/dL 1.0 1.5* 1.9* 1.4*  --  1.6*   Alkaline Phosphatase U/L 77 78 86 74  --  66   AST U/L 25 22 23 20  --  23   ALT U/L 8* 6* 9* 7*  --  9*   Magnesium mg/dL  --  1.9  --  2.0 1.8 1.8  1.8   Phosphorus mg/dL  --  3.5  --  3.5 2.5* 2.2*  2.2*       Vancomycin Administrations:  vancomycin given in the last 96 hours                     vancomycin 1.25 g in dextrose 5% 250 mL IVPB (ready to mix) (mg) 1,250 mg New Bag 10/24/22 1017    vancomycin 1.5 g in dextrose 5 % 250 mL IVPB (ready to mix) (mg) 1,500 mg New Bag 10/22/22 2240                    Microbiologic Results:  Microbiology Results (last 7 days)       Procedure Component Value Units Date/Time    Blood culture [[id number]] Collected: 10/22/22 2000    Order Status: Completed Specimen: Blood Updated: 10/23/22 2212     Blood Culture, Routine No Growth to date      No Growth to date    Blood culture [[id number]] Collected: 10/22/22 2000    Order Status: Completed Specimen: Blood Updated: 10/23/22 2212     Blood Culture, Routine No Growth to date      No Growth to date

## 2022-10-24 NOTE — PROGRESS NOTES
CRRT restarted. Orders verified.    10/23/22 1949   Treatment   Treatment Type Other  (12H SCUF/12SLED)   Treatment Status Restart   Dialysis Machine Number [id number]   Dialyzer Time (hours) 0   BVP (Liters) 0 L   Solutions Labeled and Current  Yes   Access Right;Femoral   Catheter Dressing Intact  Yes   Alarms Engaged Yes   CRRT Comments CRRT restart

## 2022-10-24 NOTE — PLAN OF CARE
Cardiac ICU Care Plan    POC reviewed with Nayan Hernandez and family. Patient extremely agitated this shift, only alert to person. Pulling out IVS and attempting to pull out central lines in confusion, patient restrained and started on precedex gtt. Patient still remains on dobutamine. CRRT now running without issue with citrate added. UF goal of 450 and tolerating. Pt progressing toward goals. Will continue to monitor. See below and flowsheets for full assessment and VS info.       Neuro:  [place] Coma Scale  Best Eye Response: 4-->(E4) spontaneous  Best Motor Response: 6-->(M6) obeys commands  Best Verbal Response: 5-->(V5) oriented  [place] Coma Scale Score: 15  Assessment Qualifiers: patient not sedated/intubated, no eye obstruction present  Pupil PERRLA: yes    24 hr Temp:  [97 °F (36.1 °C)-98.3 °F (36.8 °C)]      CV:  Rhythm: atrial rhythm   DVT prophylaxis: VTE Required Core Measure: Pharmacological prophylaxis initiated/maintained    CVP (mean): 341 mmHg (10/24/22 1700)       SVO2 (%): 77 % (10/23/22 0300)               Pulses  Right Radial Pulse: 2+ (normal)  Left Radial Pulse: 2+ (normal)  Right Dorsalis Pedis Pulse: 1+ (weak)  Left Dorsalis Pedis Pulse: 1+ (weak)  Right Posterior Tibial Pulse: 1+ (weak)  Left Posterior Tibial Pulse: 1+ (weak)    Resp:  O2 Device (Oxygen Therapy): nasal cannula  Flow (L/min): 2  Oxygen Concentration (%): 24    GI/:  GI prophylaxis: yes  Diet/Nutrition Received: other (see comments) (Renal Diet)  Last Bowel Movement: 10/24/22  Voiding Characteristics: patient on CRRT, oliguria   Intake/Output Summary (Last 24 hours) at 10/24/2022 1802  Last data filed at 10/24/2022 1700  Gross per 24 hour   Intake 3596.42 ml   Output 6484 ml   Net -2887.58 ml     Treatment Type: Slow low efficiency dialysis  UF Rate: 450 cc/hr  Nutritional Supplement Intake: Quantity N/A    Labs/Accuchecks:  Recent Labs   Lab 10/22/22  1849 10/23/22  0237 10/24/22  0403   WBC 3.37* 3.46* 3.18*   RBC  3.32* 2.81* 2.58*   HGB 9.2* 8.0* 7.4*   HCT 30.9* 25.6* 23.3*    89* 95*      Recent Labs   Lab 10/23/22  0257   INR 1.9*   APTT 24.5      Recent Labs     10/24/22  0403 10/24/22  1447     139 139   K 4.0  4.0 4.1   CO2 21*  21* 26     107 107   BUN 7*  7* 3*   CREATININE 2.2*  2.2* 1.1   ALKPHOS 66  --    ALT 9*  --    AST 23  --    BILITOT 1.6*  --        Recent Labs   Lab 10/22/22  1849   TROPONINI 0.069*      Recent Labs     10/23/22  2000 10/24/22  0359 10/24/22  1608   PH 7.420 7.410 7.385   PCO2 40.1 38.9 43.4   PO2 27* 25* 31*   HCO3 26.0 24.6 26.0   POCSATURATED 51* 47* 57*   BE 2 0 1       Electrolytes: Electrolytes replaced  Accuchecks: Q4H    Gtts/LDAs:   sodium chloride 0.9%      sodium chloride 0.9% 5 mL/hr at 10/24/22 1500    calcium gluconate infusion (CRRT) 10 mL/hr at 10/24/22 1500    dexmedetomidine (PRECEDEX) infusion 0.6 mcg/kg/hr (10/24/22 1500)    dextrose-sod citrate-citric ac 150 mL/hr at 10/24/22 1500    DOBUTamine IV infusion (non-titrating) 5 mcg/kg/min (10/24/22 1500)       Lines/Drains/Airways       Central Venous Catheter Line  Duration                  Hemodialysis Catheter right femoral -- days    Percutaneous Central Line Insertion/Assessment - Triple Lumen  10/23/22 0106 right internal jugular 1 day              Peripheral Intravenous Line  Duration                  Peripheral IV - Single Lumen 10/21/22 1827 20 G Left Antecubital 2 days                    Skin/Wounds  Bathing/Skin Care: bath, partial;back care;foot care (10/23/22 1505)  Wounds: No  Wound care consulted: No        Problem: Adult Inpatient Plan of Care  Goal: Plan of Care Review  Outcome: Ongoing, Progressing     Problem: Hemodynamic Instability (Hemodialysis)  Goal: Effective Tissue Perfusion  10/24/2022 1801 by Melissa Kim J. Kilgore, RN  Outcome: Ongoing, Progressing  10/24/2022 1801 by Melissa Kilgore, RN  Outcome: Ongoing, Progressing     Problem: Infection (Hemodialysis)  Goal:  Absence of Infection Signs and Symptoms  Outcome: Ongoing, Progressing     Problem: Infection  Goal: Absence of Infection Signs and Symptoms  Outcome: Ongoing, Progressing     Problem: Electrolyte Imbalance (Chronic Kidney Disease)  Goal: Electrolyte Balance  Outcome: Ongoing, Progressing     Problem: Device-Related Complication Risk (CRRT (Continuous Renal Replacement Therapy))  Goal: Safe, Effective Therapy Delivery  Outcome: Ongoing, Progressing

## 2022-10-24 NOTE — CARE UPDATE
CVP 28 and same as during the day  SVO2 51 and improved from 43  CI/CO 2.3/4.3  SVR 1097  HF meds:  gtt 5, dialysis dependent with no UO  Continue same treatment, repeat CVP in am

## 2022-10-24 NOTE — PROGRESS NOTES
[facility] - Cardiac Intensive Care  Nephrology  Progress Note    Patient Name: Nayan Hernandez  MRN: [id number]  Admission Date: 10/21/2022  Hospital Length of Stay: 3 days  Attending Provider: Zeny Grullon MD   Primary Care Physician: Eusebia Brady NP  Principal Problem:Cardiogenic shock    Subjective:     HPI: 68 yo M PMHx of hepatitis C, CHF with 10%EF, CAD, moderate mitral valve regurgitation, moderate tricuspid valve regurgitation, HTN, HLD, pulmonary HTN, end-stage renal disease on HD MWF (last 5 months) who was admitted on 10/21 for hypotension at the dialsis clinic. CXR revealed an enlarging thoracic aneurysm from previous.      Nephrology consulted for ESRD with HD needs    ESRD on hemodialysis  Outpatient HD Information:  -Dialysis modality: Hemodialysis  -Outpatient HD unit: [facility]   -Nephrologist: Dr. Childs  -HD TX days: Monday/Friday, duration of treatment: unk  -Last HD TX prior to hospital admission: 09/29/2022  -Dialysis access: dialysis catheter placed on R femoral   -Residual urine: oliguric   -EDW: ?      Interval History:   Per nurse patient clotted on CRRT x3; with last time unable to rinse back. Patient remains on dobutamine. Per chart last CVP documented was 28; with CI of 2.54.     I- 800cc  UOP- 100cc  CRRT- 1.7L  Net (-) 1L    Review of patient's allergies indicates:  No Known Allergies  Current Facility-Administered Medications   Medication Frequency    0.9%  NaCl infusion (CRRT USE ONLY) Continuous    0.9%  NaCl infusion Continuous    albuterol inhaler 2 puff Q6H PRN    aspirin chewable tablet 81 mg Daily    atorvastatin tablet 40 mg Daily    calcium gluconate 3 g in dextrose 5 % 100 mL infusion Continuous    cefepime in dextrose 5 % IVPB 2 g Q12H    dextrose 10% bolus 125 mL PRN    dextrose-sod citrate-citric ac 2.45-2.2 gram- 800 mg/100 mL Soln Continuous    DOBUtamine 500 mg in D5W 125 mL infusion Continuous    glucose chewable tablet 16 g PRN    hydrOXYzine HCL  tablet 25 mg TID PRN    LORazepam tablet 1 mg Q6H PRN    magnesium sulfate 2g in water 50mL IVPB (premix) PRN    naloxone 0.4 mg/mL injection 0.02 mg PRN    sodium chloride 0.9% flush 10 mL Q12H PRN    sodium phosphate 20.01 mmol in dextrose 5 % 250 mL IVPB PRN    sodium phosphate 30 mmol in dextrose 5 % 250 mL IVPB PRN    sodium phosphate 39.99 mmol in dextrose 5 % 250 mL IVPB PRN    vancomycin - pharmacy to dose pharmacy to manage frequency    vancomycin 1.25 g in dextrose 5% 250 mL IVPB (ready to mix) Once       Objective:     Vital Signs (Most Recent):  Temp: 97.4 °F (36.3 °C) (10/24/22 0700)  Pulse: 96 (10/24/22 1000)  Resp: (!) 27 (10/24/22 1000)  BP: 103/60 (10/24/22 1000)  SpO2: 99 % (10/24/22 1000)  O2 Device (Oxygen Therapy): nasal cannula (10/24/22 0823)   Vital Signs (24h Range):  Temp:  [97.3 °F (36.3 °C)-98.3 °F (36.8 °C)] 97.4 °F (36.3 °C)  Pulse:  [] 96  Resp:  [19-41] 27  SpO2:  [94 %-100 %] 99 %  BP: ()/(60-86) 103/60     Weight: 75.5 kg (166 lb 7.2 oz) (10/24/22 0932)  Body mass index is 26.06 kg/m².  Body surface area is 1.89 meters squared.    I/O last 3 completed shifts:  In: 4274.1 [P.O.:610; I.V.:3315.5; IV Piggyback:348.6]  Out: 4857 [Urine:100; Other:4757]    Physical Exam  Constitutional:       Appearance: Normal appearance.   HENT:      Head: Normocephalic and atraumatic.      Mouth/Throat:      Mouth: Mucous membranes are moist.   Neck:      Comments: right tiralysis line present.   Cardiovascular:      Rate and Rhythm: Normal rate and regular rhythm.   Pulmonary:      Effort: Pulmonary effort is normal.      Breath sounds: Normal breath sounds.   Abdominal:      General: Abdomen is flat.      Palpations: Abdomen is soft.   Musculoskeletal:         General: Normal range of motion.      Cervical back: Neck supple.      Right lower leg: No edema.      Left lower leg: No edema.   Skin:     General: Skin is warm and dry.   Neurological:      Mental Status: He is alert. Mental  status is at baseline.       Significant Labs:  All labs within the past 24 hours have been reviewed.     Significant Imaging:  Labs: Reviewed    Assessment/Plan:     ESRD (end stage renal disease)  Outpatient HD Information:  -Dialysis modality: Hemodialysis  -[facility]   -Nephrologist: Dr. Childs  -HD TX days: Monday/Friday, duration of treatment: unk  -Last HD TX prior to hospital admission: 09/29/2022  -Dialysis access: dialysis catheter placed on R femoral   -Residual urine: oliguric   - Admitted for hypotension and found to have enlarging thoracic aneurysm from previous  - Cardiogenic shock on dobutamine   - CVP 22. On one liter NC  Recommendations  - 12hr SLED 12hr SCUFF for volume removal and metabolic clearance.   - Will start patient on Citrate infusion due to clotting x3.   - RFP q6hrs  - iCal 2 hours post infusion; then 4hrs post infusion  - Avoid nephrotoxins  - renally dose all meds.     Cardiogenic Shock  - Per primary            Thank you for your consult. I will follow-up with patient. Please contact us if you have any additional questions.     Case discussed with attending. Attestation to follow.       Florencio Ayala DO  Nephrology  [facility] - [facility]

## 2022-10-24 NOTE — HPI
"HPI obtained from chart review:     Mr. Hernandez is a 64 yo gentleman with PMH of:  HCV, HFrEF 10%-15% w/ MR + TR, pHTN, HTN, HLD, ESRD MWF (last 5 months) with permacath in right groin, pAF not on AC, chronic Type B dissection s/p intervention.     Presented to ED from H D clinic  hypotension and inability to tolerate HD.  BP 96/58, mentation intact.  Reports  shortness of breath,  and  new scrotal edema.     Reports he will feel better when t"he gets fluid off".  No c/o  chest pain, abdominal pain, headaches, vision changes.  Reports able to make urine and his has become more difficult - straining. No c/o fevers, chills, N/V/D. He is making stool appropriately.     ED   Imagining   CXR -  concerning  for widening of mediastinum.   CT of chest abdomen and pelvis: indicated ascending  and descending aortic dissection    CT surgery consulted  -  patient with  chronic ascending and descending dissection. With no surgical intervention Critical care consulted  or hypoxia  and found to be stable for the floor after evaluation given patient with normal SpO2 on NC.     Nephrology consulted for dialysis needs. Recommended 500 mL bolus for hypotension. Found to have Lactic acid 2.8.     Patient admitted to hospital medicine for volume overload, hypotension, and dialysis needs.  Experienced increasing lactic acidosis - cardiology consulted and patient transferred to CICU  for closer monitoring.      Patient is not known to palliative care - consulted for goals of care and advanced care planning.                        "

## 2022-10-24 NOTE — SUBJECTIVE & OBJECTIVE
Interval History: Patient now on SLED, tolerating well. SVP remains unchanged from this AM. Will recheck SVP. Patient altered on exam today. Will attempt to contact family for GOC discussion. Palliative on board.     Review of Systems   Reason unable to perform ROS: AMS.   Objective:     Vital Signs (Most Recent):  Temp: 98.1 °F (36.7 °C) (10/24/22 1100)  Pulse: 70 (10/24/22 1500)  Resp: (!) 21 (10/24/22 1500)  BP: 116/68 (10/24/22 1500)  SpO2: 99 % (10/24/22 1500)   Vital Signs (24h Range):  Temp:  [97.4 °F (36.3 °C)-98.3 °F (36.8 °C)] 98.1 °F (36.7 °C)  Pulse:  [] 70  Resp:  [17-41] 21  SpO2:  [97 %-100 %] 99 %  BP: ()/(59-86) 116/68     Weight: 75.5 kg (166 lb 7.2 oz)  Body mass index is 26.06 kg/m².     SpO2: 99 %  O2 Device (Oxygen Therapy): nasal cannula      Intake/Output Summary (Last 24 hours) at 10/24/2022 1607  Last data filed at 10/24/2022 1500  Gross per 24 hour   Intake 3802.04 ml   Output 5843 ml   Net -2040.96 ml       Lines/Drains/Airways       Central Venous Catheter Line  Duration                  Hemodialysis Catheter right femoral -- days    Percutaneous Central Line Insertion/Assessment - Triple Lumen  10/23/22 0106 right internal jugular 1 day              Peripheral Intravenous Line  Duration                  Peripheral IV - Single Lumen 10/21/22 1827 20 G Left Antecubital 2 days                    Physical Exam  Vitals reviewed.   Constitutional:       General: He is in acute distress.      Appearance: He is ill-appearing.   Cardiovascular:      Rate and Rhythm: Regular rhythm. Tachycardia present.      Pulses: Normal pulses.      Heart sounds: Normal heart sounds.   Pulmonary:      Breath sounds: No wheezing.      Comments: Tachypnic  Abdominal:      General: Bowel sounds are normal. There is no distension.      Palpations: Abdomen is soft. There is no mass.   Musculoskeletal:      Right lower leg: No edema.      Left lower leg: No edema.   Skin:     General: Skin is warm.       Coloration: Skin is not pale.      Findings: No erythema or rash.   Neurological:      Mental Status: He is disoriented.       Significant Labs: All pertinent lab results from the last 24 hours have been reviewed.    Significant Imaging: Echocardiogram: Transthoracic echo (TTE) complete (Cupid Only):   Results for orders placed or performed during the hospital encounter of 10/21/22   Echo   Result Value Ref Range    Ascending aorta 2.68 cm    STJ 2.92 cm    AV mean gradient 2 mmHg    Ao peak bull 0.93 m/s    Ao VTI 15.53 cm    IVS 0.73 0.6 - 1.1 cm    LA size 4.85 cm    Left Atrium Major Axis 6.78 cm    Left Atrium Minor Axis 6.75 cm    LVIDd 6.18 (A) 3.5 - 6.0 cm    LVIDs 5.75 (A) 2.1 - 4.0 cm    LVOT diameter 2.08 cm    LVOT peak VTI 9.63 cm    Posterior Wall 0.71 0.6 - 1.1 cm    MV Peak E Bull 1.18 m/s    RA Major Axis 7.29 cm    RA Width 6.72 cm    RVDD 6.75 cm    Sinus 3.17 cm    TAPSE 1.51 cm    TR Max Bull 2.69 m/s    TDI LATERAL 0.07 m/s    TDI SEPTAL 0.04 m/s    LA WIDTH 4.40 cm    LV Diastolic Volume 192.89 mL    LV Systolic Volume 163.13 mL    RV S' 5.76 cm/s    LVOT peak bull 0.63 m/s    LA volume (mod) 94.40 cm3    LV LATERAL E/E' RATIO 16.86 m/s    LV SEPTAL E/E' RATIO 29.50 m/s    FS 7 %    LA volume 122.71 cm3    LV mass 172.34 g    Left Ventricle Relative Wall Thickness 0.23 cm    AV valve area 2.11 cm2    AV Velocity Ratio 0.68     AV index (prosthetic) 0.62     Mean e' 0.06 m/s    LVOT area 3.4 cm2    LVOT stroke volume 32.71 cm3    AV peak gradient 3 mmHg    E/E' ratio 21.45 m/s    LV Systolic Volume Index 87.2 mL/m2    LV Diastolic Volume Index 103.15 mL/m2    LA Volume Index 65.6 mL/m2    LV Mass Index 92 g/m2    Triscuspid Valve Regurgitation Peak Gradient 29 mmHg    LA Volume Index (Mod) 50.5 mL/m2    BSA 1.89 m2    Right Atrial Pressure (from IVC) 15 mmHg    QEF 18 %    EF 20 %    TV rest pulmonary artery pressure 44 mmHg    Narrative    · The estimated ejection fraction is 20%. . No thrombus is  present .   Contrast used.  · The quantitatively derived ejection fraction is 18%.  · The left ventricle is mildly enlarged with severely decreased systolic   function.  · There is severe left ventricular global hypokinesis.  · Moderate right ventricular enlargement with mildly to moderately reduced   right ventricular systolic function.  · Severe left atrial enlargement.  · Moderate mitral regurgitation.  · Massive tricuspid regurgitation (greater then severe). VC is 1 cm.  · Left ventricular diastolic dysfunction.  · There is pulmonary hypertension.  · The estimated PA systolic pressure is 49 mmHg.  · Elevated central venous pressure (15 mmHg). IVC is gerater then 4 cm. RA   presure is likely 20 mmHg.

## 2022-10-24 NOTE — PROGRESS NOTES
Patient completed 10H of SCUF tx. Per MD nunez to change to SLED. Orders verified. CRRT restarted.   10/24/22 0320   Treatment   Treatment Type Other  (12H SCUF/ 12H SLED)   Treatment Status Restart   Dialysis Machine Number [id number]   Dialyzer Time (hours) 0   BVP (Liters) 0 L   Solutions Labeled and Current  Yes   Access Right;Femoral   Catheter Dressing Intact  Yes   Alarms Engaged Yes   CRRT Comments CRRT restarted   Prescription   Time (Hours) Other  (12H SCUF/ 12H SLED)   Dialysate K + (mEq/L) 4   Dialysate CA + (mEq/L) 2.25   Dialysate HCO3 - (Bicarb) (mEq/L) 30   Dialysate Na + (mEq/L) 140   Cartridge Type   (Revaclear)   Dialysate Flow Rate (mL/min) 200   UF Goal Rate 350 mL/hr   CRRT Hourly Documentation   Blood Flow (mL/min) 200   UF Rate 350 cc/hr   Arterial Pressure (mmHg) -40 mmHg   Venous Pressure (mmHg) 70 mmHg   Effluent Pressure (EP) (mmHg) 20 mmHg   Total UF (Hourly Cleared) (mL) 0

## 2022-10-24 NOTE — NURSING
Patients TMP suddenly in the 200s and constantly alarming. Dialysis RN called and updated. Orders to rinse back at this time. Will talk to team about adding citrate at this point, this is the third time clotting off in 12 hours. Monitoring.

## 2022-10-24 NOTE — ASSESSMENT & PLAN NOTE
Outpatient HD Information:  -Dialysis modality: Hemodialysis  -Outpatient HD unit: [facility]   -Nephrologist: Dr. Childs  -HD TX days: Monday/Friday, duration of treatment: unk  -Last HD TX prior to hospital admission: 09/29/2022  -Dialysis access: dialysis catheter placed on R femoral   -Residual urine: oliguric   - Admitted for hypotension and found to have enlarging thoracic aneurysm from previous  - Cardiogenic shock on dobutamine   - CVP 22. On one liter NC  Recommendations  - 12hr SLED 12hr SCUFF for volume removal and metabolic clearance.   - Will start patient on Citrate infusion due to clotting x3.   - RFP q6hrs  - iCal 2 hours post infusion; then 4hrs post infusion  - Avoid nephrotoxins  - renally dose all meds.

## 2022-10-24 NOTE — ASSESSMENT & PLAN NOTE
Nayan Hernandez is a 62 yo M hx of  HCV, HFrEF ( EF 10% w/ MR + TR), pHTN, HTN, HLD, ESRD MWF (within the past month) with permacath in right groin, pAF however not on AC, chronic Type B dissection s/p intervention 2011, non-compliance (poor follow up, missed HD sessions) admitted for hypotension to hospital medicine,  that has now improved with short course of IVF. The patient was then transfered to the CICU for concern of cargioneck shock due to concern for somnolence, with rising lactic acidosis with change in clinical status.     HDS on     SVO2: 70  CVP: 27  CO: 6.8  CI: 3.6  SVR: 537  Inotropes/ Vasopressors:  @ 5.    Plan  - SVP unchanged from this AM due to recurrent clots during SLED, will recheck this evening  - Continue  @ 5. HR prior to  and on  90-110s. Closely monitor HR  - on SLED for volume removal  - Hold midodrine for now  - MAP >65 goal

## 2022-10-24 NOTE — SUBJECTIVE & OBJECTIVE
Interval History:   Per nurse patient clotted on CRRT x3; with last time unable to rinse back. Patient remains on dobutamine. Per chart last CVP documented was 28; with CI of 2.54.     I- 800cc  UOP- 100cc  CRRT- 1.7L  Net (-) 1L    Review of patient's allergies indicates:  No Known Allergies  Current Facility-Administered Medications   Medication Frequency    0.9%  NaCl infusion (CRRT USE ONLY) Continuous    0.9%  NaCl infusion Continuous    albuterol inhaler 2 puff Q6H PRN    aspirin chewable tablet 81 mg Daily    atorvastatin tablet 40 mg Daily    calcium gluconate 3 g in dextrose 5 % 100 mL infusion Continuous    cefepime in dextrose 5 % IVPB 2 g Q12H    dextrose 10% bolus 125 mL PRN    dextrose-sod citrate-citric ac 2.45-2.2 gram- 800 mg/100 mL Soln Continuous    DOBUtamine 500 mg in D5W 125 mL infusion Continuous    glucose chewable tablet 16 g PRN    hydrOXYzine HCL tablet 25 mg TID PRN    LORazepam tablet 1 mg Q6H PRN    magnesium sulfate 2g in water 50mL IVPB (premix) PRN    naloxone 0.4 mg/mL injection 0.02 mg PRN    sodium chloride 0.9% flush 10 mL Q12H PRN    sodium phosphate 20.01 mmol in dextrose 5 % 250 mL IVPB PRN    sodium phosphate 30 mmol in dextrose 5 % 250 mL IVPB PRN    sodium phosphate 39.99 mmol in dextrose 5 % 250 mL IVPB PRN    vancomycin - pharmacy to dose pharmacy to manage frequency    vancomycin 1.25 g in dextrose 5% 250 mL IVPB (ready to mix) Once       Objective:     Vital Signs (Most Recent):  Temp: 97.4 °F (36.3 °C) (10/24/22 0700)  Pulse: 96 (10/24/22 1000)  Resp: (!) 27 (10/24/22 1000)  BP: 103/60 (10/24/22 1000)  SpO2: 99 % (10/24/22 1000)  O2 Device (Oxygen Therapy): nasal cannula (10/24/22 0823)   Vital Signs (24h Range):  Temp:  [97.3 °F (36.3 °C)-98.3 °F (36.8 °C)] 97.4 °F (36.3 °C)  Pulse:  [] 96  Resp:  [19-41] 27  SpO2:  [94 %-100 %] 99 %  BP: ()/(60-86) 103/60     Weight: 75.5 kg (166 lb 7.2 oz) (10/24/22 0932)  Body mass index is 26.06 kg/m².  Body surface  area is 1.89 meters squared.    I/O last 3 completed shifts:  In: 4274.1 [P.O.:610; I.V.:3315.5; IV Piggyback:348.6]  Out: 4857 [Urine:100; Other:4757]    Physical Exam  Constitutional:       Appearance: Normal appearance.   HENT:      Head: Normocephalic and atraumatic.      Mouth/Throat:      Mouth: Mucous membranes are moist.   Neck:      Comments: right tiralysis line present.   Cardiovascular:      Rate and Rhythm: Normal rate and regular rhythm.   Pulmonary:      Effort: Pulmonary effort is normal.      Breath sounds: Normal breath sounds.   Abdominal:      General: Abdomen is flat.      Palpations: Abdomen is soft.   Musculoskeletal:         General: Normal range of motion.      Cervical back: Neck supple.      Right lower leg: No edema.      Left lower leg: No edema.   Skin:     General: Skin is warm and dry.   Neurological:      Mental Status: He is alert. Mental status is at baseline.       Significant Labs:  All labs within the past 24 hours have been reviewed.     Significant Imaging:  Labs: Reviewed

## 2022-10-24 NOTE — HOSPITAL COURSE
Mr. Hernandez presented to [facility] from dialysis clinic for hypotensive episode during HD. Patient with known history of type B aortic aneurysm s/p repair in 2011 with CT CAP showing progressive aortic dissection extending to the right common iliac and left proximal internal iliac artery. Patient is not a candidate for repair given high mortality with procedure. Patient placed on dobutamine for concern of cardiogenic shock. He has a SVP consistently of 28. Patient unable to tolerate CRRT, now requiring , Epi, and Vaso. CXR with worsening pulmonary congestion. Per discussions with daughter (Kate), will make patient DNR but will not transition to comfort care at this time. Family at bedside and decision was made to transition to comfort care on 10/29.    
97

## 2022-10-24 NOTE — SUBJECTIVE & OBJECTIVE
Interval History:     Past Medical History:   Diagnosis Date    Aortic dissection     Innis Type A     Cardiac defibrillator in place     CHF (congestive heart failure) 12/15/14    EF 15-20%    Coronary artery disease     ETOH abuse     Hepatitis C antibody test positive     Hypertension     S/P aortic dissection repair 2011       Past Surgical History:   Procedure Laterality Date    CORONARY ARTERY BYPASS GRAFT         Review of patient's allergies indicates:  No Known Allergies    Medications:  Continuous Infusions:   sodium chloride 0.9%      sodium chloride 0.9% 5 mL/hr at 10/24/22 1000    calcium gluconate infusion (CRRT)      dextrose-sod citrate-citric ac      DOBUTamine IV infusion (non-titrating) 5 mcg/kg/min (10/24/22 1000)     Scheduled Meds:   aspirin  81 mg Oral Daily    atorvastatin  40 mg Oral Daily    ceFEPime (MAXIPIME) IVPB  2 g Intravenous Q12H    vancomycin (VANCOCIN) IVPB  15 mg/kg (Dosing Weight) Intravenous Once     PRN Meds:albuterol, dextrose 10%, glucose, hydrOXYzine, LORazepam, magnesium sulfate IVPB, naloxone, sodium chloride 0.9%, sodium phosphate IVPB, sodium phosphate IVPB, sodium phosphate IVPB, Pharmacy to dose Vancomycin consult **AND** vancomycin - pharmacy to dose    Family History    No significant family history reported        Tobacco Use    Smoking status: Every Day    Smokeless tobacco: Never   Substance and Sexual Activity    Alcohol use: Yes     Alcohol/week: 0.0 standard drinks     Types: 1 - 2 drink(s) per week    Drug use: Not on file    Sexual activity: Not on file       Review of Systems   Unable to perform ROS: Mental status change   Objective:     Vital Signs (Most Recent):  Temp: 97.4 °F (36.3 °C) (10/24/22 0700)  Pulse: 96 (10/24/22 1000)  Resp: (!) 27 (10/24/22 1000)  BP: 103/60 (10/24/22 1000)  SpO2: 99 % (10/24/22 1000)   Vital Signs (24h Range):  Temp:  [97.3 °F (36.3 °C)-98.3 °F (36.8 °C)] 97.4 °F (36.3 °C)  Pulse:  [] 96  Resp:   [19-41] 27  SpO2:  [94 %-100 %] 99 %  BP: ()/(60-86) 103/60     Weight: 75.5 kg (166 lb 7.2 oz)  Body mass index is 26.06 kg/m².    Physical Exam  Vitals and nursing note reviewed.   Constitutional:       Appearance: He is ill-appearing.      Comments: sedated   HENT:      Head: Normocephalic and atraumatic.      Right Ear: External ear normal.      Left Ear: External ear normal.      Nose: Nose normal.      Mouth/Throat:      Mouth: Mucous membranes are moist.   Eyes:      Conjunctiva/sclera: Conjunctivae normal.   Neck:      Comments: Right IJ triple lumen catheter  Cardiovascular:      Rate and Rhythm: Normal rate and regular rhythm.      Pulses: Normal pulses.      Heart sounds: Normal heart sounds.   Pulmonary:      Effort: No respiratory distress.      Comments: Supplemental oxygen   Abdominal:      General: Bowel sounds are normal.      Tenderness: There is no abdominal tenderness.   Genitourinary:     Comments: CRRT in progress,  scrotal edema   Musculoskeletal:      Cervical back: Normal range of motion.      Right lower leg: Edema present.      Left lower leg: No edema.   Skin:     General: Skin is warm and dry.   Neurological:      Mental Status: He is disoriented.      Motor: Weakness present.      Comments: Sedated    Psychiatric:      Comments: Disoriented, Sedated and in soft wrist restraints to prevent pulling lines  Unable to assess thought content and judgement        Review of Symptoms      Symptom Assessment (ESAS 0-10 Scale)  Unable to complete assessment due to Mental status change     Cam / Delirium: unable to participate in exam.  Constipation:  Negative  Diarrhea:  Negative      Bowel Management Plan (BMP):  No      Pain Assessment:  OME in 24 hours:  0  Location(s):      Pain Assessment in Advanced Demential Scale (PAINAD)   Breathing - Independent of vocalization:  0  Negative vocalization:  0  Facial expression:  0  Body language:  0  Consolability:  0  Total:  0    DEREK Scale  (Modified): patient unable to participate in conversation.    Performance Status:  20    Living Arrangements:  Lives alone    Psychosocial/Cultural: Lives alone, recently released from retirement system, retired from [employer] , 5 adult children, 3 daughters and 2 sons, enjoys watching television.  Family support from daughter Kate.     Spiritual:  F - Qiana and Belief:  Not addressed, patient unable to participate in conversation, no family present   A - Address in Care:  Not addressed at this time       Advance Care Planning   Advance Directives:   Living Will: No        Oral Declaration: No    LaPOST: No    Do Not Resuscitate Status: No    Medical Power of : Yes        Oral Declaration: No    Agent's Name:  Kate Dickey   Agent's Contact Number:  [phone]    Decision Making:  Family answered questions  Goals of Care: See assessment and plan        Significant Labs: All pertinent labs within the past 24 hours have been reviewed.  CBC:   Recent Labs   Lab 10/24/22  0403   WBC 3.18*   HGB 7.4*   HCT 23.3*   MCV 90   PLT 95*     BMP:  Recent Labs   Lab 10/24/22  0403   GLU 86  86     139   K 4.0  4.0     107   CO2 21*  21*   BUN 7*  7*   CREATININE 2.2*  2.2*   CALCIUM 8.7  8.7   MG 1.8  1.8     LFT:  Lab Results   Component Value Date    AST 23 10/24/2022    ALKPHOS 66 10/24/2022    BILITOT 1.6 (H) 10/24/2022     Albumin:   Albumin   Date Value Ref Range Status   10/24/2022 2.7 (L) 3.5 - 5.2 g/dL Final   10/24/2022 2.7 (L) 3.5 - 5.2 g/dL Final     Protein:   Total Protein   Date Value Ref Range Status   10/24/2022 6.7 6.0 - 8.4 g/dL Final     Lactic acid:   Lab Results   Component Value Date    LACTATE 2.2 10/23/2022    LACTATE 5.0 (HH) 10/22/2022       Significant Imaging: I have reviewed all pertinent imaging results/findings within the past 24 hours.

## 2022-10-24 NOTE — PROGRESS NOTES
[facility] - [facility]  Cardiology  Progress Note    Patient Name: Nayan Hernandez  MRN: [id number]  Admission Date: 10/21/2022  Hospital Length of Stay: 3 days  Code Status: Full Code   Attending Physician: Zeny Grullon MD   Primary Care Physician: Eusebia Brady NP  Expected Discharge Date: 10/28/2022  Principal Problem:Cardiogenic shock    Subjective:     Hospital Course:   [place]. [place] [facility] [place] episode during HD. Patient with known history of type B aortic aneurysm s/p repair in 2011 with CT CAP showing progressive aortic dissection extending to the right common iliac and left proximal internal iliac artery. Patient is not a candidate for repair given high mortality with procedure. Patient placed on dobutamine for concern of cardiogenic shock. He has a SVP consistently of 28. Nephrology consulted for SLED, started 10/24.       Interval History: Patient now on SLED, tolerating well. SVP remains unchanged from this AM. Will recheck SVP. Patient altered on exam today. Will attempt to contact family for GOC discussion. Palliative on board.     Review of Systems   Reason unable to perform ROS: AMS.   Objective:     Vital Signs (Most Recent):  Temp: 98.1 °F (36.7 °C) (10/24/22 1100)  Pulse: 70 (10/24/22 1500)  Resp: (!) 21 (10/24/22 1500)  BP: 116/68 (10/24/22 1500)  SpO2: 99 % (10/24/22 1500)   Vital Signs (24h Range):  Temp:  [97.4 °F (36.3 °C)-98.3 °F (36.8 °C)] 98.1 °F (36.7 °C)  Pulse:  [] 70  Resp:  [17-41] 21  SpO2:  [97 %-100 %] 99 %  BP: ()/(59-86) 116/68     Weight: 75.5 kg (166 lb 7.2 oz)  Body mass index is 26.06 kg/m².     SpO2: 99 %  O2 Device (Oxygen Therapy): nasal cannula      Intake/Output Summary (Last 24 hours) at 10/24/2022 1607  Last data filed at 10/24/2022 1500  Gross per 24 hour   Intake 3802.04 ml   Output 5843 ml   Net -2040.96 ml       Lines/Drains/Airways       Central Venous Catheter Line  Duration                   Hemodialysis Catheter right femoral -- days    Percutaneous Central Line Insertion/Assessment - Triple Lumen  10/23/22 0106 right internal jugular 1 day              Peripheral Intravenous Line  Duration                  Peripheral IV - Single Lumen 10/21/22 1827 20 G Left Antecubital 2 days                    Physical Exam  Vitals reviewed.   Constitutional:       General: He is in acute distress.      Appearance: He is ill-appearing.   Cardiovascular:      Rate and Rhythm: Regular rhythm. Tachycardia present.      Pulses: Normal pulses.      Heart sounds: Normal heart sounds.   Pulmonary:      Breath sounds: No wheezing.      Comments: Tachypnic  Abdominal:      General: Bowel sounds are normal. There is no distension.      Palpations: Abdomen is soft. There is no mass.   Musculoskeletal:      Right lower leg: No edema.      Left lower leg: No edema.   Skin:     General: Skin is warm.      Coloration: Skin is not pale.      Findings: No erythema or rash.   Neurological:      Mental Status: He is disoriented.       Significant Labs: All pertinent lab results from the last 24 hours have been reviewed.    Significant Imaging: Echocardiogram: Transthoracic echo (TTE) complete (Cupid Only):   Results for orders placed or performed during the hospital encounter of 10/21/22   Echo   Result Value Ref Range    Ascending aorta 2.68 cm    STJ 2.92 cm    AV mean gradient 2 mmHg    Ao peak bull 0.93 m/s    Ao VTI 15.53 cm    IVS 0.73 0.6 - 1.1 cm    LA size 4.85 cm    Left Atrium Major Axis 6.78 cm    Left Atrium Minor Axis 6.75 cm    LVIDd 6.18 (A) 3.5 - 6.0 cm    LVIDs 5.75 (A) 2.1 - 4.0 cm    LVOT diameter 2.08 cm    LVOT peak VTI 9.63 cm    Posterior Wall 0.71 0.6 - 1.1 cm    MV Peak E Bull 1.18 m/s    RA Major Axis 7.29 cm    RA Width 6.72 cm    RVDD 6.75 cm    Sinus 3.17 cm    TAPSE 1.51 cm    TR Max Bull 2.69 m/s    TDI LATERAL 0.07 m/s    TDI SEPTAL 0.04 m/s    LA WIDTH 4.40 cm    LV Diastolic Volume 192.89 mL    LV  Systolic Volume 163.13 mL    RV S' 5.76 cm/s    LVOT peak trever 0.63 m/s    LA volume (mod) 94.40 cm3    LV LATERAL E/E' RATIO 16.86 m/s    LV SEPTAL E/E' RATIO 29.50 m/s    FS 7 %    LA volume 122.71 cm3    LV mass 172.34 g    Left Ventricle Relative Wall Thickness 0.23 cm    AV valve area 2.11 cm2    AV Velocity Ratio 0.68     AV index (prosthetic) 0.62     Mean e' 0.06 m/s    LVOT area 3.4 cm2    LVOT stroke volume 32.71 cm3    AV peak gradient 3 mmHg    E/E' ratio 21.45 m/s    LV Systolic Volume Index 87.2 mL/m2    LV Diastolic Volume Index 103.15 mL/m2    LA Volume Index 65.6 mL/m2    LV Mass Index 92 g/m2    Triscuspid Valve Regurgitation Peak Gradient 29 mmHg    LA Volume Index (Mod) 50.5 mL/m2    BSA 1.89 m2    Right Atrial Pressure (from IVC) 15 mmHg    QEF 18 %    EF 20 %    TV rest pulmonary artery pressure 44 mmHg    Narrative    · The estimated ejection fraction is 20%. . No thrombus is present .   Contrast used.  · The quantitatively derived ejection fraction is 18%.  · The left ventricle is mildly enlarged with severely decreased systolic   function.  · There is severe left ventricular global hypokinesis.  · Moderate right ventricular enlargement with mildly to moderately reduced   right ventricular systolic function.  · Severe left atrial enlargement.  · Moderate mitral regurgitation.  · Massive tricuspid regurgitation (greater then severe). VC is 1 cm.  · Left ventricular diastolic dysfunction.  · There is pulmonary hypertension.  · The estimated PA systolic pressure is 49 mmHg.  · Elevated central venous pressure (15 mmHg). IVC is gerater then 4 cm. RA   presure is likely 20 mmHg.        Assessment and Plan:       * Cardiogenic shock  Refer to 'acute on chronic systolic HF'    Paroxysmal atrial fibrillation  ECG with atrial fibrillation. Patient reports he previous hx of atrial fib. Afib from OSH was documented as of early this month.   CHADSVASc 3.     - Anticoagulation held in previous admission  given hx of significant aneurysmal dilatation of the thoracic aorta. Continue to hold.  - Ensure to monitor and correct electrolytes    Acute on chronic systolic heart failure  Nayan Hernandez is a 64 yo M hx of  HCV, HFrEF ( EF 10% w/ MR + TR), pHTN, HTN, HLD, ESRD MWF (within the past month) with permacath in right groin, pAF however not on AC, chronic Type B dissection s/p intervention 2011, non-compliance (poor follow up, missed HD sessions) admitted for hypotension to hospital medicine,  that has now improved with short course of IVF. The patient was then transfered to the CICU for concern of cargioneck shock due to concern for somnolence, with rising lactic acidosis with change in clinical status.     HDS on     SVO2: 70  CVP: 27  CO: 6.8  CI: 3.6  SVR: 537  Inotropes/ Vasopressors:  @ 5.    Plan  - SVP unchanged from this AM due to recurrent clots during SLED, will recheck this evening  - Continue  @ 5. HR prior to  and on  90-110s. Closely monitor HR  - on SLED for volume removal  - Hold midodrine for now  - MAP >65 goal       Ascending aortic dissection  Patient with known chronic dissection involving the aortic arch and descending aorta. CTA this admit demonstrated below:   The CTA C/A/P demonstrated aortic dissection extending to the right common iliac and left proximal internal iliac artery with significant aneurysmal dilatation of the thoracic aorta and dissection flap involves the proximal right subclavian artery and also left renal artery.    - CTS evalauted the patient and deemed him a non-surgical candidate  - Monitor BP and avoid hypertensive episodes.     ESRD (end stage renal disease)  - Last HD session was 10/22 with removal of 3L  -SLED today        VTE Risk Mitigation (From admission, onward)         Ordered     IP VTE HIGH RISK PATIENT  Once         10/21/22 2237     Place sequential compression device  Until discontinued         10/21/22 2237                Lenka Burrell  MD  Cardiology  [facility]

## 2022-10-24 NOTE — NURSING
"11:38 AM  Patient suddenly half way out of bed. Ripped PIV out and holding central line, stating , " I am getting out of here." Disoriented to time, place and situation at this time. Dr. Soni paged and updated. Expressed concerns for safety and CRRT line being pulled out. Orders for precedex and restraints at this time. Will apply and continue to monitor.   "

## 2022-10-25 NOTE — PROGRESS NOTES
10/25/22 0225   Treatment   Treatment Type SCUF   Treatment Status Daily equipment check   Dialysis Machine Number [id number]   Dialyzer Time (hours) 14.39   BVP (Liters) 105.1 L   Solutions Labeled and Current  Yes   Access Temporary Cath;Right;Femoral   Catheter Dressing Intact  Yes   Alarms Engaged Yes   CRRT Comments daily check   $ CRRT Charges   $ CRRT Daily Assessment Complete   $ CRRT Daily Maintenance Complete   Daily check completed. Orders and machine settings verified.

## 2022-10-25 NOTE — ASSESSMENT & PLAN NOTE
Outpatient HD Information:  -Dialysis modality: Hemodialysis  -Outpatient HD unit: [facility]   -Nephrologist: Dr. Childs  -HD TX days: Monday/Friday, duration of treatment: unk  -Last HD TX prior to hospital admission: 09/29/2022  -Dialysis access: dialysis catheter placed on R femoral   -Residual urine: oliguric   - Admitted for hypotension and found to have enlarging thoracic aneurysm from previous  - Cardiogenic shock on dobutamine   Recommendations  - CRRT/SLED for volume removal and metabolic clearance.   - Continue Citrate infusion  - Obtain iCal q8hrs; repeat pending  - RFP q6hrs  - Avoid nephrotoxins  - renally dose all meds.   - Guarded prognosis, agree with goals of care discussion with family.

## 2022-10-25 NOTE — SUBJECTIVE & OBJECTIVE
Interval History:   Overnight patient became hypotensive. UF decreased and patient placed on Epinephrine and Vasopressin.     Patient agitated and altered. Family to come by and discuss goals of care with primary and palliative care team today.     I- 1.8L  UOP- 0  CRRT-5.3L  Net (-) 3.5L    CVP 34; CI 2.39    Review of patient's allergies indicates:  No Known Allergies  Current Facility-Administered Medications   Medication Frequency    0.9%  NaCl infusion Continuous    albuterol inhaler 2 puff Q6H PRN    aspirin chewable tablet 81 mg Daily    atorvastatin tablet 40 mg Daily    cefepime in dextrose 5 % IVPB 2 g Q12H    dexmedetomidine (PRECEDEX) 400mcg/100mL 0.9% NaCL infusion Continuous    dextrose 10% bolus 125 mL PRN    DOBUtamine 500 mg in D5W 125 mL infusion Continuous    EPINEPHrine 5 mg in dextrose 5% 250 mL infusion (premix) Continuous    glucose chewable tablet 16 g PRN    morphine 2 mg/mL injection     morphine injection 2 mg Q2H PRN    naloxone 0.4 mg/mL injection 0.02 mg PRN    NORepinephrine 32 mg in dextrose 5 % 250 mL infusion Continuous    NORepinephrine bitartrate-D5W 4 mg/250 mL (16 mcg/mL) infusion Soln     sodium chloride 0.9% flush 10 mL Q12H PRN    vancomycin - pharmacy to dose pharmacy to manage frequency    vasopressin (PITRESSIN) 0.2 Units/mL in dextrose 5 % 100 mL infusion Continuous       Objective:     Vital Signs (Most Recent):  Temp: 100.2 °F (37.9 °C) (10/25/22 1400)  Pulse: 100 (10/25/22 1400)  Resp: (!) 23 (10/25/22 1400)  BP: 98/65 (10/25/22 1400)  SpO2: 95 % (10/25/22 1400)  O2 Device (Oxygen Therapy): nasal cannula (10/25/22 0700)   Vital Signs (24h Range):  Temp:  [92.1 °F (33.4 °C)-100.2 °F (37.9 °C)] 100.2 °F (37.9 °C)  Pulse:  [] 100  Resp:  [12-46] 23  SpO2:  [95 %-100 %] 95 %  BP: ()/(52-80) 98/65  Arterial Line BP: ()/(46-60) 103/54     Weight: 75.5 kg (166 lb 7.2 oz) (10/24/22 6650)  Body mass index is 26.06 kg/m².  Body surface area is 1.89 meters  squared.    I/O last 3 completed shifts:  In: 7087.3 [P.O.:150; I.V.:2959.4; IV Piggyback:3977.9]  Out: 56569 [Urine:50; Other:52095]    Physical Exam  Vitals and nursing note reviewed.   Constitutional:       Appearance: He is ill-appearing.      Comments: sedated   HENT:      Head: Normocephalic and atraumatic.      Right Ear: External ear normal.      Left Ear: External ear normal.      Nose: Nose normal.      Mouth/Throat:      Mouth: Mucous membranes are moist.   Eyes:      Conjunctiva/sclera: Conjunctivae normal.   Neck:      Comments: Right IJ triple lumen catheter  Cardiovascular:      Rate and Rhythm: Normal rate and regular rhythm.      Pulses: Normal pulses.      Heart sounds: Normal heart sounds.   Pulmonary:      Effort: No respiratory distress.      Comments: Supplemental oxygen   Abdominal:      General: Bowel sounds are normal.      Tenderness: There is no abdominal tenderness.   Genitourinary:     Comments: CRRT in progress,  scrotal edema   Musculoskeletal:      Cervical back: Normal range of motion.      Right lower leg: Edema present.      Left lower leg: No edema.   Skin:     General: Skin is warm and dry.   Neurological:      Mental Status: He is disoriented.      Motor: Weakness present.      Comments: Sedated    Psychiatric:      Comments: Disoriented, Sedated and in soft wrist restraints to prevent pulling lines  Unable to assess thought content and judgement        Significant Labs:  All labs within the past 24 hours have been reviewed.     Significant Imaging:  Labs: Reviewed

## 2022-10-25 NOTE — PLAN OF CARE
Pt became hypotensive while receiving CRRT.   UF reduced to 250 and pts MAP's still in 50's.   Dr. Umanzor notified.   Dr. Umanzor placed R arterial art line.   Epi and Vaso Started.   UF increased back to goal of 450 and now titrating down off Epi as MAP's tolerate.

## 2022-10-25 NOTE — ASSESSMENT & PLAN NOTE
Nayan Hernandez is a 64 yo M hx of  HCV, HFrEF ( EF 10% w/ MR + TR), pHTN, HTN, HLD, ESRD MWF (within the past month) with permacath in right groin, pAF however not on AC, chronic Type B dissection s/p intervention 2011, non-compliance (poor follow up, missed HD sessions) admitted for hypotension to hospital medicine,  that has now improved with short course of IVF. The patient was then transfered to the CICU for concern of cargioneck shock due to concern for somnolence, with rising lactic acidosis with change in clinical status.     HDS on pressor support  SVO2: 65  CVP: 26  CO: 67.3  CI: 3.8  SVR: 481  Inotropes/ Vasopressors:  @ 5., Epi 0.06, Vaso 0.04    Plan  - Continue pressor support per family  - Unable to tolerate CRRT  - Hold midodrine for now  - MAP >65 goal

## 2022-10-25 NOTE — CARE UPDATE
Hemodynamics 9pm:     CVP 34  SVO2 40  CO 4.53  CI 2.39            -CRRT clotted at beginning of shift. Citrate added by nephro.   - On precedex and  5 gtt.   - Now with cool extremities, axillary tem 91.2F. Warming blankets applied by RN.       Plan:   -Cont current mgmt.  - Called daughter Kate Dickey w/ no success.   - Guarded prognosis.           Alma Umanzor,   Cardiology fellow  [facility]

## 2022-10-25 NOTE — ASSESSMENT & PLAN NOTE
- Last HD session was 10/22 with removal of 3L  - Pt became hypotensive while receiving CRRT.   - UF reduced to 250 and pts MAP's still in 50's.  - Now with pressor requirements

## 2022-10-25 NOTE — PROGRESS NOTES
Pharmacokinetic Assessment Follow Up: IV Vancomycin    Vancomycin serum concentration assessment(s):    The random level was drawn correctly and can be used to guide therapy at this time. The measurement is below the desired definitive target range of 15 to 20 mcg/mL.    Vancomycin Regimen Plan:    Give 1250 mg. Re-dose when the random level is less than 20 mcg/mL, next level to be drawn at 1700 on 10/25    Drug levels (last 3 results):  Recent Labs   Lab Result Units 10/24/22  0403 10/24/22  2020   Vancomycin, Random ug/mL 12.2 13.1       Pharmacy will continue to follow and monitor vancomycin.    Please contact pharmacy at extension [phone] for questions regarding this assessment.    Thank you for the consult,   Iban Kenney       Patient brief summary:  Nayan Hernandez is a 67 y.o. male initiated on antimicrobial therapy with IV Vancomycin for treatment of bacteremia    Drug Allergies:   Review of patient's allergies indicates:  No Known Allergies    Actual Body Weight:   75.5 kg    Renal Function:   Estimated Creatinine Clearance: 81.5 mL/min (based on SCr of 0.8 mg/dL).,     Dialysis Method (if applicable):  SLED and SCUF    CBC (last 72 hours):  Recent Labs   Lab Result Units 10/22/22  0545 10/22/22  1849 10/23/22  0237 10/24/22  0403   WBC K/uL 3.61* 3.37* 3.46* 3.18*   Hemoglobin g/dL 8.5* 9.2* 8.0* 7.4*   Hematocrit % 27.5* 30.9* 25.6* 23.3*   Platelets K/uL 137* 154 89* 95*   Gran % % 66.7 64.6 66.7 71.7   Lymph % % 17.7* 19.9 16.8* 14.5*   Mono % % 13.0 13.1 14.2 11.9   Eosinophil % % 1.7 1.8 1.7 1.3   Basophil % % 0.6 0.3 0.3 0.3   Differential Method  Automated Automated Automated Automated       Metabolic Panel (last 72 hours):  Recent Labs   Lab Result Units 10/22/22  0545 10/22/22  2001 10/23/22  0237 10/24/22  0001 10/24/22  0403 10/24/22  1447 10/24/22  2020   Sodium mmol/L 141 137 139 136 139  139 139 137   Potassium mmol/L 3.2* 4.0 4.0 4.0 4.0  4.0 4.1 4.1   Chloride mmol/L 96 100 103 104 107   107 107 104   CO2 mmol/L 30* 19* 25 23 21*  21* 26 22*   Glucose mg/dL 67* 81 94 87 86  86 90 96   BUN mg/dL 12 8 10 8 7*  7* 3* <3*   Creatinine mg/dL 3.6* 2.8* 3.0* 2.6* 2.2*  2.2* 1.1 0.8   Albumin g/dL 2.9* 3.2* 2.7* 2.7* 2.7*  2.7* 2.6* 2.6*   Total Bilirubin mg/dL 1.5* 1.9* 1.4*  --  1.6*  --   --    Alkaline Phosphatase U/L 78 86 74  --  66  --   --    AST U/L 22 23 20  --  23  --   --    ALT U/L 6* 9* 7*  --  9*  --   --    Magnesium mg/dL 1.9  --  2.0 1.8 1.8  1.8 2.0 1.8   Phosphorus mg/dL 3.5  --  3.5 2.5* 2.2*  2.2* 1.1* <1.0*       Vancomycin Administrations:  vancomycin given in the last 96 hours                     vancomycin 1.25 g in dextrose 5% 250 mL IVPB (ready to mix) (mg) 1,250 mg New Bag 10/24/22 1017    vancomycin 1.5 g in dextrose 5 % 250 mL IVPB (ready to mix) (mg) 1,500 mg New Bag 10/22/22 2240                    Microbiologic Results:  Microbiology Results (last 7 days)       Procedure Component Value Units Date/Time    Blood culture [[id number]] Collected: 10/22/22 2000    Order Status: Completed Specimen: Blood Updated: 10/24/22 2212     Blood Culture, Routine No Growth to date      No Growth to date      No Growth to date    Blood culture [[id number]] Collected: 10/22/22 2000    Order Status: Completed Specimen: Blood Updated: 10/24/22 2212     Blood Culture, Routine No Growth to date      No Growth to date      No Growth to date

## 2022-10-25 NOTE — PROGRESS NOTES
[facility] - [facility]iac [facility]  Cardiology  Progress Note    Patient Name: Nayan Hernandez  MRN: [id number]  Admission Date: 10/21/2022  Hospital Length of Stay: 4 days  Code Status: DNR   Attending Physician: Zeny Grullon MD   Primary Care Physician: Eusebia Brady NP  Expected Discharge Date: 10/28/2022  Principal Problem:Cardiogenic shock    Subjective:     Hospital Course:   Mr. Hernandez presented to [facility] from dialysis clinic for hypotensive episode during HD. Patient with known history of type B aortic aneurysm s/p repair in 2011 with CT CAP showing progressive aortic dissection extending to the right common iliac and left proximal internal iliac artery. Patient is not a candidate for repair given high mortality with procedure. Patient placed on dobutamine for concern of cardiogenic shock. He has a SVP consistently of 28. Nephrology consulted for SLED, started 10/24.       Interval History: Patient unable to tolerate CRRT. He became hypotensive and required pressor support to maintain MAPS>60. Patient remains altered on exam today. Daughter DENNIS Love, contacted and at bedside this AM. Per discussions, patient is now DNR with IV morphine for comfort. He will remain on pressors at this time.     Review of Systems   Reason unable to perform ROS: AMS.   Objective:     Vital Signs (Most Recent):  Temp: 98.4 °F (36.9 °C) (10/25/22 1200)  Pulse: 90 (10/25/22 1200)  Resp: 20 (10/25/22 1224)  BP: (!) 83/57 (10/25/22 1200)  SpO2: 99 % (10/25/22 1200)   Vital Signs (24h Range):  Temp:  [92.1 °F (33.4 °C)-98.4 °F (36.9 °C)] 98.4 °F (36.9 °C)  Pulse:  [] 90  Resp:  [12-46] 20  SpO2:  [95 %-100 %] 99 %  BP: ()/(52-80) 83/57  Arterial Line BP: ()/(46-60) 97/53     Weight: 75.5 kg (166 lb 7.2 oz)  Body mass index is 26.06 kg/m².     SpO2: 99 %  O2 Device (Oxygen Therapy): nasal cannula      Intake/Output Summary (Last 24 hours) at 10/25/2022 1250  Last data filed at 10/25/2022 1200  Gross per 24  hour   Intake 5146.36 ml   Output 8382 ml   Net -3235.64 ml         Lines/Drains/Airways       Central Venous Catheter Line  Duration                  Hemodialysis Catheter right femoral -- days    Percutaneous Central Line Insertion/Assessment - Triple Lumen  10/23/22 0106 right internal jugular 2 days              Arterial Line  Duration             Arterial Line 10/25/22 0329 Right Radial <1 day                    Physical Exam  Vitals reviewed.   Constitutional:       General: He is in acute distress.      Appearance: He is ill-appearing.   Cardiovascular:      Rate and Rhythm: Regular rhythm. Tachycardia present.      Pulses: Normal pulses.      Heart sounds: Normal heart sounds.   Pulmonary:      Breath sounds: No wheezing.      Comments: Tachypnic  Abdominal:      General: Bowel sounds are normal. There is no distension.      Palpations: Abdomen is soft. There is no mass.   Musculoskeletal:      Right lower leg: No edema.      Left lower leg: No edema.   Skin:     General: Skin is warm.      Coloration: Skin is not pale.      Findings: No erythema or rash.   Neurological:      Mental Status: He is disoriented.       Significant Labs: All pertinent lab results from the last 24 hours have been reviewed.    Significant Imaging: Echocardiogram: Transthoracic echo (TTE) complete (Cupid Only):   Results for orders placed or performed during the hospital encounter of 10/21/22   Echo   Result Value Ref Range    Ascending aorta 2.68 cm    STJ 2.92 cm    AV mean gradient 2 mmHg    Ao peak bull 0.93 m/s    Ao VTI 15.53 cm    IVS 0.73 0.6 - 1.1 cm    LA size 4.85 cm    Left Atrium Major Axis 6.78 cm    Left Atrium Minor Axis 6.75 cm    LVIDd 6.18 (A) 3.5 - 6.0 cm    LVIDs 5.75 (A) 2.1 - 4.0 cm    LVOT diameter 2.08 cm    LVOT peak VTI 9.63 cm    Posterior Wall 0.71 0.6 - 1.1 cm    MV Peak E Bull 1.18 m/s    RA Major Axis 7.29 cm    RA Width 6.72 cm    RVDD 6.75 cm    Sinus 3.17 cm    TAPSE 1.51 cm    TR Max Bull 2.69 m/s     TDI LATERAL 0.07 m/s    TDI SEPTAL 0.04 m/s    LA WIDTH 4.40 cm    LV Diastolic Volume 192.89 mL    LV Systolic Volume 163.13 mL    RV S' 5.76 cm/s    LVOT peak trever 0.63 m/s    LA volume (mod) 94.40 cm3    LV LATERAL E/E' RATIO 16.86 m/s    LV SEPTAL E/E' RATIO 29.50 m/s    FS 7 %    LA volume 122.71 cm3    LV mass 172.34 g    Left Ventricle Relative Wall Thickness 0.23 cm    AV valve area 2.11 cm2    AV Velocity Ratio 0.68     AV index (prosthetic) 0.62     Mean e' 0.06 m/s    LVOT area 3.4 cm2    LVOT stroke volume 32.71 cm3    AV peak gradient 3 mmHg    E/E' ratio 21.45 m/s    LV Systolic Volume Index 87.2 mL/m2    LV Diastolic Volume Index 103.15 mL/m2    LA Volume Index 65.6 mL/m2    LV Mass Index 92 g/m2    Triscuspid Valve Regurgitation Peak Gradient 29 mmHg    LA Volume Index (Mod) 50.5 mL/m2    BSA 1.89 m2    Right Atrial Pressure (from IVC) 15 mmHg    QEF 18 %    EF 20 %    TV rest pulmonary artery pressure 44 mmHg    Narrative    · The estimated ejection fraction is 20%. . No thrombus is present .   Contrast used.  · The quantitatively derived ejection fraction is 18%.  · The left ventricle is mildly enlarged with severely decreased systolic   function.  · There is severe left ventricular global hypokinesis.  · Moderate right ventricular enlargement with mildly to moderately reduced   right ventricular systolic function.  · Severe left atrial enlargement.  · Moderate mitral regurgitation.  · Massive tricuspid regurgitation (greater then severe). VC is 1 cm.  · Left ventricular diastolic dysfunction.  · There is pulmonary hypertension.  · The estimated PA systolic pressure is 49 mmHg.  · Elevated central venous pressure (15 mmHg). IVC is gerater then 4 cm. RA   presure is likely 20 mmHg.        Assessment and Plan:       * Cardiogenic shock  Refer to 'acute on chronic systolic HF'    Paroxysmal atrial fibrillation  ECG with atrial fibrillation. Patient reports he previous hx of atrial fib. Afib from OSH  was documented as of early this month.   CHADSVASc 3.     - Anticoagulation held in previous admission given hx of significant aneurysmal dilatation of the thoracic aorta. Continue to hold.  - Ensure to monitor and correct electrolytes    Acute on chronic systolic heart failure  Nayan Hernandez is a 64 yo M hx of  HCV, HFrEF ( EF 10% w/ MR + TR), pHTN, HTN, HLD, ESRD MWF (within the past month) with permacath in right groin, pAF however not on AC, chronic Type B dissection s/p intervention 2011, non-compliance (poor follow up, missed HD sessions) admitted for hypotension to hospital medicine,  that has now improved with short course of IVF. The patient was then transfered to the CICU for concern of cargioneck shock due to concern for somnolence, with rising lactic acidosis with change in clinical status.     HDS on pressor support  SVO2: 65  CVP: 26  CO: 67.3  CI: 3.8  SVR: 481  Inotropes/ Vasopressors:  @ 5., Epi 0.06, Vaso 0.04    Plan  - Continue pressor support per family  - Unable to tolerate CRRT  - Hold midodrine for now  - MAP >65 goal       Ascending aortic dissection  Patient with known chronic dissection involving the aortic arch and descending aorta. CTA this admit demonstrated below:   The CTA C/A/P demonstrated aortic dissection extending to the right common iliac and left proximal internal iliac artery with significant aneurysmal dilatation of the thoracic aorta and dissection flap involves the proximal right subclavian artery and also left renal artery.    - CTS evalauted the patient and deemed him a non-surgical candidate  - Monitor BP and avoid hypertensive episodes.     ESRD (end stage renal disease)  - Last HD session was 10/22 with removal of 3L  - Pt became hypotensive while receiving CRRT.   - UF reduced to 250 and pts MAP's still in 50's.  - Now with pressor requirements      VTE Risk Mitigation (From admission, onward)           Ordered     IP VTE HIGH RISK PATIENT  Once         10/21/22  2237     Place sequential compression device  Until discontinued         10/21/22 2237                    Lenka Burrell MD  Cardiology  [facility]

## 2022-10-25 NOTE — SUBJECTIVE & OBJECTIVE
Interval History: Patient unable to tolerate CRRT. He became hypotensive and required pressor support to maintain MAPS>60. Patient remains altered on exam today. Daughter DENNIS Love, contacted and at bedside this AM. Per discussions, patient is now DNR with IV morphine for comfort. He will remain on pressors at this time.     Review of Systems   Reason unable to perform ROS: AMS.   Objective:     Vital Signs (Most Recent):  Temp: 98.4 °F (36.9 °C) (10/25/22 1200)  Pulse: 90 (10/25/22 1200)  Resp: 20 (10/25/22 1224)  BP: (!) 83/57 (10/25/22 1200)  SpO2: 99 % (10/25/22 1200)   Vital Signs (24h Range):  Temp:  [92.1 °F (33.4 °C)-98.4 °F (36.9 °C)] 98.4 °F (36.9 °C)  Pulse:  [] 90  Resp:  [12-46] 20  SpO2:  [95 %-100 %] 99 %  BP: ()/(52-80) 83/57  Arterial Line BP: ()/(46-60) 97/53     Weight: 75.5 kg (166 lb 7.2 oz)  Body mass index is 26.06 kg/m².     SpO2: 99 %  O2 Device (Oxygen Therapy): nasal cannula      Intake/Output Summary (Last 24 hours) at 10/25/2022 1250  Last data filed at 10/25/2022 1200  Gross per 24 hour   Intake 5146.36 ml   Output 8382 ml   Net -3235.64 ml         Lines/Drains/Airways       Central Venous Catheter Line  Duration                  Hemodialysis Catheter right femoral -- days    Percutaneous Central Line Insertion/Assessment - Triple Lumen  10/23/22 0106 right internal jugular 2 days              Arterial Line  Duration             Arterial Line 10/25/22 0329 Right Radial <1 day                    Physical Exam  Vitals reviewed.   Constitutional:       General: He is in acute distress.      Appearance: He is ill-appearing.   Cardiovascular:      Rate and Rhythm: Regular rhythm. Tachycardia present.      Pulses: Normal pulses.      Heart sounds: Normal heart sounds.   Pulmonary:      Breath sounds: No wheezing.      Comments: Tachypnic  Abdominal:      General: Bowel sounds are normal. There is no distension.      Palpations: Abdomen is soft. There is no mass.    Musculoskeletal:      Right lower leg: No edema.      Left lower leg: No edema.   Skin:     General: Skin is warm.      Coloration: Skin is not pale.      Findings: No erythema or rash.   Neurological:      Mental Status: He is disoriented.       Significant Labs: All pertinent lab results from the last 24 hours have been reviewed.    Significant Imaging: Echocardiogram: Transthoracic echo (TTE) complete (Cupid Only):   Results for orders placed or performed during the hospital encounter of 10/21/22   Echo   Result Value Ref Range    Ascending aorta 2.68 cm    STJ 2.92 cm    AV mean gradient 2 mmHg    Ao peak bull 0.93 m/s    Ao VTI 15.53 cm    IVS 0.73 0.6 - 1.1 cm    LA size 4.85 cm    Left Atrium Major Axis 6.78 cm    Left Atrium Minor Axis 6.75 cm    LVIDd 6.18 (A) 3.5 - 6.0 cm    LVIDs 5.75 (A) 2.1 - 4.0 cm    LVOT diameter 2.08 cm    LVOT peak VTI 9.63 cm    Posterior Wall 0.71 0.6 - 1.1 cm    MV Peak E Bull 1.18 m/s    RA Major Axis 7.29 cm    RA Width 6.72 cm    RVDD 6.75 cm    Sinus 3.17 cm    TAPSE 1.51 cm    TR Max Bull 2.69 m/s    TDI LATERAL 0.07 m/s    TDI SEPTAL 0.04 m/s    LA WIDTH 4.40 cm    LV Diastolic Volume 192.89 mL    LV Systolic Volume 163.13 mL    RV S' 5.76 cm/s    LVOT peak bull 0.63 m/s    LA volume (mod) 94.40 cm3    LV LATERAL E/E' RATIO 16.86 m/s    LV SEPTAL E/E' RATIO 29.50 m/s    FS 7 %    LA volume 122.71 cm3    LV mass 172.34 g    Left Ventricle Relative Wall Thickness 0.23 cm    AV valve area 2.11 cm2    AV Velocity Ratio 0.68     AV index (prosthetic) 0.62     Mean e' 0.06 m/s    LVOT area 3.4 cm2    LVOT stroke volume 32.71 cm3    AV peak gradient 3 mmHg    E/E' ratio 21.45 m/s    LV Systolic Volume Index 87.2 mL/m2    LV Diastolic Volume Index 103.15 mL/m2    LA Volume Index 65.6 mL/m2    LV Mass Index 92 g/m2    Triscuspid Valve Regurgitation Peak Gradient 29 mmHg    LA Volume Index (Mod) 50.5 mL/m2    BSA 1.89 m2    Right Atrial Pressure (from IVC) 15 mmHg    QEF 18 %    EF 20  %    TV rest pulmonary artery pressure 44 mmHg    Narrative    · The estimated ejection fraction is 20%. . No thrombus is present .   Contrast used.  · The quantitatively derived ejection fraction is 18%.  · The left ventricle is mildly enlarged with severely decreased systolic   function.  · There is severe left ventricular global hypokinesis.  · Moderate right ventricular enlargement with mildly to moderately reduced   right ventricular systolic function.  · Severe left atrial enlargement.  · Moderate mitral regurgitation.  · Massive tricuspid regurgitation (greater then severe). VC is 1 cm.  · Left ventricular diastolic dysfunction.  · There is pulmonary hypertension.  · The estimated PA systolic pressure is 49 mmHg.  · Elevated central venous pressure (15 mmHg). IVC is gerater then 4 cm. RA   presure is likely 20 mmHg.

## 2022-10-25 NOTE — PLAN OF CARE
10/24/22 0900, 1045, 1430 and 1600 CM went to do assessment with pt yesterday but pt refused to talk to CM, attempted to call daughter Kate but phone line was busy, unsuccessful with multiple attempts.     10/25/22 0910 Attempted to call daughter again with no success, both numbers are ringing busy signal    Vandana Guzmán, HILLARYN, RN    [phone]

## 2022-10-25 NOTE — PROCEDURES
"Nayan Hernandez is a 67 y.o. male patient.    Temp: (!) 95.4 °F (35.2 °C) (10/25/22 0000)  Pulse: 89 (10/25/22 0000)  Resp: 16 (10/25/22 0000)  BP: (!) 87/59 (10/25/22 0000)  SpO2: 99 % (10/25/22 0000)  Weight: 75.5 kg (166 lb 7.2 oz) (10/24/22 0932)  Height: 5' 7.01" (170.2 cm) (10/24/22 0932)       Arterial Line    Date/Time: 10/25/2022 3:34 AM  Location procedure was performed: [facility] CARDIAC ICU  Performed by: Alma Enriquez MD  Authorized by: Alma Enriquez MD   Pre-op Diagnosis: Cardiogenic shock  Post-operative diagnosis: Cardiogenic shock  Consent Done: Emergent Situation  Preparation: Patient was prepped and draped in the usual sterile fashion.  Indications: hemodynamic monitoring  Location: right radial  Anesthesia: local infiltration    Anesthesia:  Local Anesthetic: topical anesthetic    Patient sedated: yes  Sedatives: see MAR for details  Number of attempts: 1  Complications: No  Estimated blood loss (mL): 1  Specimens: No  Implants: No  Post-procedure: line sutured and dressing applied  Post-procedure CMS: unchanged  Patient tolerance: Patient tolerated the procedure well with no immediate complications        10/25/2022  Alma Umanzor MD  Cardiology fellow  [facility]    "

## 2022-10-25 NOTE — ASSESSMENT & PLAN NOTE
Palliative care consulted for goals of care and advanced care planning for Mr. 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  Initially admitted to hospital medicine with hypotension and transitioned to CICU with concerns for cardiogenic shock. Significant decline over night now requiring Dobutamine and vasopressors: noepinephrine, epinephrine and vasopressin.  Poorly tolerating CRRT secondary to continued hypotension.  Increased confusion and agitation with precedex.  Appears uncomfortable with facial grimacing, moaning and frequent verbal outbursts. Supplemental oxygen 2 liters nasal cannula with sats 99% to 100%      Advance Care Planning   Goals of Care  - Mr. Hernandez unable to participate in ACP conversation   - HPOA has been received. Gilbert Dickey [phone] is designated decision maker.   - Resuscitation status - full code per primary team     Goals of Care   - Palliative care follow up to goals of care.  Met with gilbert Love at bedside. Palliative care introduced.  Daughter is amenable to advanced care planning and goals of care discussions.  - Daughter had an limited  understanding of current clinical condition.  She expressed worry about having to make a serious conversation so quickly.  States she is worried her family will not understand as she does not fully understand the medical terms  - Palliative care APRN provided information in very basic terms. Expressed worry that the current medications are not working enough to make significant changes.  Gently explained her father is in the dying phase.    - Daughter is hesitant to consent to DNR order. Palliative care discussed the risks, benefits and Mr. Hernandez's survivability of CPR.  Pal care expressed concern that the patient would not survive CPR and recommended having a DNR order. - Explained the current medications would be continued.  Also discussed  no escalation of care  - Daughter amenable to DNR order and no escalation of care.  Daughter has requested not to disclose DNR status to the extended family.   - Additional family to arrive later today.  Daughter is amenable to having further advanced care planning and goals of care discussions   -  -     Symptom Management  Pain  - patient unable to verbalize pain level   - appears uncomfortable - verbal outburst, agitation  - family not amenable to full comfort measures.   - family is amenable to increasing frequency of morphine  - explained goal is comfort not to hasten death or quiet the patient   Recommendations   Change to   Morphine 2 mg IVP every 2 hrs as needed for pain and or dyspnea     Dyspnea   Supplemental oxygen  - 2 liters nasal cannula   - sats  97% - 100 %   - see above     Hypotension/ Chronic aortic dissection  /Cardiogenic shock   - Managed per primary team and specialty consultants   - CTS evaluated and found to have no surgical interventions  - continue current plan of care    - Palliative care following for goals of care  -      ESRD  - managed per primary team and specialty consultants   - CRRT in progress   - continue current plan of care   - pal care following for goals of care -      Plan/Recommendations    - family not amenable to full comfort measures but is amenable to continuing current plan of care with no escalation  - daughter is amenable to DNR order - primary team to place order  - daughter has requested not to disclose DNR status to other family.  - Waiting on additional family to arrive and family is amenable to further goals of care discussions   - emotional support provided       Dr. Grullon notified of the above discussions and recommendations             .

## 2022-10-25 NOTE — SUBJECTIVE & OBJECTIVE
Interval History: unable to tolerate HD, on SLED, pressor requirements increased,  - now with three agents,  increased agitation with precedex,  family meeting today - amenable to DNR.    Past Medical History:   Diagnosis Date    Aortic dissection     Juan Carlos Type A     Cardiac defibrillator in place     CHF (congestive heart failure) 12/15/14    EF 15-20%    Coronary artery disease     ETOH abuse     Hepatitis C antibody test positive     Hypertension     S/P aortic dissection repair 2011       Past Surgical History:   Procedure Laterality Date    CORONARY ARTERY BYPASS GRAFT         Review of patient's allergies indicates:  No Known Allergies    Medications:  Continuous Infusions:   sodium chloride 0.9% 5 mL/hr at 10/25/22 1200    dexmedetomidine (PRECEDEX) infusion 1.4 mcg/kg/hr (10/25/22 1200)    DOBUTamine IV infusion (non-titrating) 5 mcg/kg/min (10/25/22 1200)    EPINEPHrine 0.2 mcg/kg/min (10/25/22 1200)    NORepinephrine bitartrate-D5W 0.02 mcg/kg/min (10/25/22 1036)    vasopressin 0.04 Units/min (10/25/22 1228)     Scheduled Meds:   aspirin  81 mg Oral Daily    atorvastatin  40 mg Oral Daily    ceFEPime (MAXIPIME) IVPB  2 g Intravenous Q12H    morphine        NORepinephrine bitartrate-D5W         PRN Meds:albuterol, dextrose 10%, glucose, morphine, naloxone, sodium chloride 0.9%, Pharmacy to dose Vancomycin consult **AND** vancomycin - pharmacy to dose    Family History    No significant family history reported        Tobacco Use    Smoking status: Every Day    Smokeless tobacco: Never   Substance and Sexual Activity    Alcohol use: Yes     Alcohol/week: 0.0 standard drinks     Types: 1 - 2 drink(s) per week    Drug use: Not on file    Sexual activity: Not on file       Review of Systems   Unable to perform ROS: Mental status change   Objective:     Vital Signs (Most Recent):  Temp: 98.4 °F (36.9 °C) (10/25/22 1200)  Pulse: 90 (10/25/22 1200)  Resp: 20 (10/25/22 1224)  BP: (!) 83/57 (10/25/22 1200)  SpO2:  99 % (10/25/22 1200)   Vital Signs (24h Range):  Temp:  [92.1 °F (33.4 °C)-98.4 °F (36.9 °C)] 98.4 °F (36.9 °C)  Pulse:  [] 90  Resp:  [12-46] 20  SpO2:  [95 %-100 %] 99 %  BP: ()/(52-80) 83/57  Arterial Line BP: ()/(46-60) 97/53     Weight: 75.5 kg (166 lb 7.2 oz)  Body mass index is 26.06 kg/m².    Physical Exam  Vitals and nursing note reviewed.   Constitutional:       Appearance: He is ill-appearing.      Comments: sedated   HENT:      Head: Normocephalic and atraumatic.      Right Ear: External ear normal.      Left Ear: External ear normal.      Nose: Nose normal.      Mouth/Throat:      Mouth: Mucous membranes are moist.   Eyes:      Conjunctiva/sclera: Conjunctivae normal.   Neck:      Comments: Right IJ triple lumen catheter  Cardiovascular:      Rate and Rhythm: Normal rate and regular rhythm.      Pulses: Normal pulses.      Heart sounds: Normal heart sounds.   Pulmonary:      Effort: No respiratory distress.      Comments: Supplemental oxygen   Abdominal:      General: Bowel sounds are normal.      Tenderness: There is no abdominal tenderness.   Genitourinary:     Comments: CRRT in progress,  scrotal edema   Musculoskeletal:      Cervical back: Normal range of motion.      Right lower leg: Edema present.      Left lower leg: No edema.   Skin:     General: Skin is warm and dry.   Neurological:      Mental Status: He is disoriented.      Motor: Weakness present.      Comments: Sedated    Psychiatric:      Comments: Disoriented, Sedated and in soft wrist restraints to prevent pulling lines  Unable to assess thought content and judgement        Review of Symptoms      Symptom Assessment (ESAS 0-10 Scale)  Pain:  0  Dyspnea:  0  Anxiety:  0  Nausea:  0  Depression:  0  Anorexia:  0  Fatigue:  0  Insomnia:  0  Restlessness:  0  Agitation:  0 due to Mental status change     Cam / Delirium: unable to participate in exam.  Constipation:  Negative  Diarrhea:  Negative      Bowel Management Plan  (BMP):  No      Pain Assessment:  OME in 24 hours:  0  Location(s):      Pain Assessment in Advanced Demential Scale (PAINAD)   Breathing - Independent of vocalization:  0  Negative vocalization:  0  Facial expression:  0  Body language:  0  Consolability:  0  Total:  0    DEREK Scale (Modified): patient unable to participate in conversation.    Performance Status:  20    Living Arrangements:  Lives alone    Psychosocial/Cultural: Lives alone, recently released from custodial system, retired from [employer]. Not , 5 adult children, 3 daughters and 2 sons, enjoys watching television.  Family support from daughter Kate.     Spiritual:  F - Qiana and Belief:  Not addressed, patient unable to participate in conversation, no family present   A - Address in Care:  Not addressed at this time       Advance Care Planning        Significant Labs: All pertinent labs within the past 24 hours have been reviewed.  CBC:   Recent Labs   Lab 10/25/22  0401   WBC 3.02*   HGB 7.7*   HCT 24.2*   MCV 90   PLT 86*       BMP:  Recent Labs   Lab 10/25/22  0401   *  181*     136   K 4.1  4.1   CL 99  99   CO2 18*  18*   BUN <3*  <3*   CREATININE 0.9  0.9   CALCIUM 8.4*  8.4*   MG 2.5  2.5       LFT:  Lab Results   Component Value Date    AST 23 10/25/2022    ALKPHOS 67 10/25/2022    BILITOT 1.3 (H) 10/25/2022     Albumin:   Albumin   Date Value Ref Range Status   10/25/2022 2.6 (L) 3.5 - 5.2 g/dL Final   10/25/2022 2.6 (L) 3.5 - 5.2 g/dL Final     Protein:   Total Protein   Date Value Ref Range Status   10/25/2022 6.7 6.0 - 8.4 g/dL Final     Lactic acid:   Lab Results   Component Value Date    LACTATE 2.2 10/23/2022    LACTATE 5.0 (HH) 10/22/2022       Significant Imaging: I have reviewed all pertinent imaging results/findings within the past 24 hours.

## 2022-10-25 NOTE — PLAN OF CARE
Cardiac ICU Care Plan    POC reviewed with Nayan Hernandez and family. Questions and concerns addressed. Patient unable to tolerate CRRT this morning. EPI and vasopressin infusing to keep MAP >65 dobutamine and precedex also infusing. Goals of care meetings with daughter today , patient made a DNR with no escalations of care. Morphine q2hrs added for pain relief.  Pt progressing toward goals. Will continue to monitor. See below and flowsheets for full assessment and VS info.       Neuro:  [place] Coma Scale  Best Eye Response: 3-->(E3) to speech  Best Motor Response: 4-->(M4) withdraws from pain  Best Verbal Response: 4-->(V4) confused  [place] Coma Scale Score: 11  Assessment Qualifiers: patient not sedated/intubated, no eye obstruction present  Pupil PERRLA: yes    24 hr Temp:  [92.1 °F (33.4 °C)-100.2 °F (37.9 °C)]      CV:  Rhythm: atrial rhythm   DVT prophylaxis: VTE Required Core Measure: Pharmacological prophylaxis initiated/maintained    CVP (mean): 32 mmHg (10/25/22 1500)       SVO2 (%): 77 % (10/23/22 0300)               Pulses  Right Radial Pulse: 2+ (normal)  Left Radial Pulse: 2+ (normal)  Right Dorsalis Pedis Pulse: 1+ (weak)  Left Dorsalis Pedis Pulse: 1+ (weak)  Right Posterior Tibial Pulse: 1+ (weak)  Left Posterior Tibial Pulse: 1+ (weak)    Resp:  O2 Device (Oxygen Therapy): nasal cannula  Flow (L/min): 2  Oxygen Concentration (%): 24    GI/:  GI prophylaxis: yes  Diet/Nutrition Received: NPO  Last Bowel Movement: 10/24/22  Voiding Characteristics: external catheter   Intake/Output Summary (Last 24 hours) at 10/25/2022 1807  Last data filed at 10/25/2022 1700  Gross per 24 hour   Intake 4527.15 ml   Output 6140 ml   Net -1612.85 ml     Treatment Type: Slow continuous ultrafiltration  UF Rate: 400 cc/hr  Nutritional Supplement Intake: Quantity N/A  Labs/Accuchecks:  Recent Labs   Lab 10/23/22  0237 10/24/22  0403 10/25/22  0401   WBC 3.46* 3.18* 3.02*   RBC 2.81* 2.58* 2.70*   HGB 8.0* 7.4*  7.7*   HCT 25.6* 23.3* 24.2*   PLT 89* 95* 86*      Recent Labs   Lab 10/23/22  0257   INR 1.9*   APTT 24.5      Recent Labs     10/25/22  0401 10/25/22  1435     136 137   K 4.1  4.1 4.1   CO2 18*  18* 18*   CL 99  99 99   BUN <3*  <3* 4*   CREATININE 0.9  0.9 1.4   ALKPHOS 67  --    ALT 8*  --    AST 23  --    BILITOT 1.3*  --        Recent Labs   Lab 10/22/22  1849   TROPONINI 0.069*      Recent Labs     10/24/22  2017 10/25/22  0409 10/25/22  0655   PH 7.367 7.407 7.457*   PCO2 44.3 38.3 27.8*   PO2 24* 33* 110*   HCO3 25.4 24.1 19.7*   POCSATURATED 40* 65* 99   BE 0 -1 -4       Electrolytes: Electrolytes replaced  Accuchecks: ACHS    Gtts/LDAs:   sodium chloride 0.9% 5 mL/hr at 10/25/22 1700    dexmedetomidine (PRECEDEX) infusion 1.4 mcg/kg/hr (10/25/22 1700)    DOBUTamine IV infusion (non-titrating) 5 mcg/kg/min (10/25/22 1700)    EPINEPHrine 0.24 mcg/kg/min (10/25/22 1742)    NORepinephrine bitartrate-D5W      vasopressin 0.04 Units/min (10/25/22 1700)       Lines/Drains/Airways       Central Venous Catheter Line  Duration                  Hemodialysis Catheter right femoral -- days    Percutaneous Central Line Insertion/Assessment - Triple Lumen  10/23/22 0106 right internal jugular 2 days              Arterial Line  Duration             Arterial Line 10/25/22 0329 Right Radial <1 day                    Skin/Wounds  Bathing/Skin Care: bath, complete;linen changed;incontinence care;electrode patches/site rotation (10/24/22 2100)  Wounds: No  Wound care consulted: No   Problem: Adult Inpatient Plan of Care  Goal: Plan of Care Review  Outcome: Ongoing, Progressing     Problem: Skin Injury Risk Increased  Goal: Skin Health and Integrity  Outcome: Ongoing, Progressing     Problem: Hemodynamic Instability (Hemodialysis)  Goal: Effective Tissue Perfusion  Outcome: Ongoing, Progressing

## 2022-10-25 NOTE — NURSING
Patient suddenly requiring increasing pressor requirements. UF on CRRT turned off but still pressors are low. Dr. Soni called and updated. Orders to rinse CRRT back at this time and to order levophed. Epi and vaso already infusing to sustain BP. Awaiting daughter to come to bedside for goals of care meeting.

## 2022-10-25 NOTE — PLAN OF CARE
CCU Plan of Care    Patient is now DNR after palliative care discussions with patient's daughter. Will continue current pressors without further escalation in care while waiting for family to drive in from out of town. No further CRRT. Continue morphine 2mg IV every 2 hrs PRN for pain or dyspnea.    Vinnie Soni MD  Cardiology Fellow

## 2022-10-25 NOTE — SUBJECTIVE & OBJECTIVE
Interval History: Patient unable to tolerate CRRT overnight. He became hypotensive now requiring pressor support. Patient remains altered on exam today. Daughter DENNIS Love, at bedside this AM. Per discussion with family, palliative, and staff, decision was made to change code status to DNR. Patient will remain on pressor support and IV prn morphine for comfort. CXR concerning for increase in pulmonary edema.     Review of Systems   Reason unable to perform ROS: AMS.   Objective:     Vital Signs (Most Recent):  Temp: 98.4 °F (36.9 °C) (10/25/22 1200)  Pulse: 90 (10/25/22 1200)  Resp: 20 (10/25/22 1224)  BP: (!) 83/57 (10/25/22 1200)  SpO2: 99 % (10/25/22 1200)   Vital Signs (24h Range):  Temp:  [92.1 °F (33.4 °C)-98.4 °F (36.9 °C)] 98.4 °F (36.9 °C)  Pulse:  [] 90  Resp:  [12-46] 20  SpO2:  [95 %-100 %] 99 %  BP: ()/(52-80) 83/57  Arterial Line BP: ()/(46-60) 97/53     Weight: 75.5 kg (166 lb 7.2 oz)  Body mass index is 26.06 kg/m².     SpO2: 99 %  O2 Device (Oxygen Therapy): nasal cannula      Intake/Output Summary (Last 24 hours) at 10/25/2022 1250  Last data filed at 10/25/2022 1200  Gross per 24 hour   Intake 5146.36 ml   Output 8382 ml   Net -3235.64 ml       Lines/Drains/Airways       Central Venous Catheter Line  Duration                  Hemodialysis Catheter right femoral -- days    Percutaneous Central Line Insertion/Assessment - Triple Lumen  10/23/22 0106 right internal jugular 2 days              Arterial Line  Duration             Arterial Line 10/25/22 0329 Right Radial <1 day                    Physical Exam  Vitals reviewed.   Constitutional:       General: He is in acute distress.      Appearance: He is ill-appearing.   Cardiovascular:      Rate and Rhythm: Regular rhythm. Tachycardia present.      Pulses: Normal pulses.      Heart sounds: Normal heart sounds.   Pulmonary:      Breath sounds: No wheezing.      Comments: Tachypnic  Abdominal:      General: Bowel sounds are normal.  There is no distension.      Palpations: Abdomen is soft. There is no mass.   Musculoskeletal:      Right lower leg: No edema.      Left lower leg: No edema.   Skin:     General: Skin is warm.      Coloration: Skin is not pale.      Findings: No erythema or rash.   Neurological:      Mental Status: He is disoriented.       Significant Labs: All pertinent lab results from the last 24 hours have been reviewed.    Significant Imaging: Echocardiogram: Transthoracic echo (TTE) complete (Cupid Only):   Results for orders placed or performed during the hospital encounter of 10/21/22   Echo   Result Value Ref Range    Ascending aorta 2.68 cm    STJ 2.92 cm    AV mean gradient 2 mmHg    Ao peak bull 0.93 m/s    Ao VTI 15.53 cm    IVS 0.73 0.6 - 1.1 cm    LA size 4.85 cm    Left Atrium Major Axis 6.78 cm    Left Atrium Minor Axis 6.75 cm    LVIDd 6.18 (A) 3.5 - 6.0 cm    LVIDs 5.75 (A) 2.1 - 4.0 cm    LVOT diameter 2.08 cm    LVOT peak VTI 9.63 cm    Posterior Wall 0.71 0.6 - 1.1 cm    MV Peak E Bull 1.18 m/s    RA Major Axis 7.29 cm    RA Width 6.72 cm    RVDD 6.75 cm    Sinus 3.17 cm    TAPSE 1.51 cm    TR Max Bull 2.69 m/s    TDI LATERAL 0.07 m/s    TDI SEPTAL 0.04 m/s    LA WIDTH 4.40 cm    LV Diastolic Volume 192.89 mL    LV Systolic Volume 163.13 mL    RV S' 5.76 cm/s    LVOT peak bull 0.63 m/s    LA volume (mod) 94.40 cm3    LV LATERAL E/E' RATIO 16.86 m/s    LV SEPTAL E/E' RATIO 29.50 m/s    FS 7 %    LA volume 122.71 cm3    LV mass 172.34 g    Left Ventricle Relative Wall Thickness 0.23 cm    AV valve area 2.11 cm2    AV Velocity Ratio 0.68     AV index (prosthetic) 0.62     Mean e' 0.06 m/s    LVOT area 3.4 cm2    LVOT stroke volume 32.71 cm3    AV peak gradient 3 mmHg    E/E' ratio 21.45 m/s    LV Systolic Volume Index 87.2 mL/m2    LV Diastolic Volume Index 103.15 mL/m2    LA Volume Index 65.6 mL/m2    LV Mass Index 92 g/m2    Triscuspid Valve Regurgitation Peak Gradient 29 mmHg    LA Volume Index (Mod) 50.5 mL/m2     BSA 1.89 m2    Right Atrial Pressure (from IVC) 15 mmHg    QEF 18 %    EF 20 %    TV rest pulmonary artery pressure 44 mmHg    Narrative    · The estimated ejection fraction is 20%. . No thrombus is present .   Contrast used.  · The quantitatively derived ejection fraction is 18%.  · The left ventricle is mildly enlarged with severely decreased systolic   function.  · There is severe left ventricular global hypokinesis.  · Moderate right ventricular enlargement with mildly to moderately reduced   right ventricular systolic function.  · Severe left atrial enlargement.  · Moderate mitral regurgitation.  · Massive tricuspid regurgitation (greater then severe). VC is 1 cm.  · Left ventricular diastolic dysfunction.  · There is pulmonary hypertension.  · The estimated PA systolic pressure is 49 mmHg.  · Elevated central venous pressure (15 mmHg). IVC is gerater then 4 cm. RA   presure is likely 20 mmHg.

## 2022-10-25 NOTE — NURSING
Pts extremities cool. Axillary temp 91.2F.   Warming blanket applied. Rectal temp probe inserted.

## 2022-10-25 NOTE — PROGRESS NOTES
Pharmacokinetic Assessment Follow Up: IV Vancomycin    Vancomycin serum concentration assessment(s):    The random level was drawn correctly and can be used to guide therapy at this time. The measurement is above the desired definitive target range of 15 to 20 mcg/mL.    Vancomycin Regimen Plan:    Will continue pulse dosing   Supratherapeutic Level. Hold dose tonight   Re-dose when the random level is less than 20 mcg/mL, next level to be drawn at 0500 on 10/26    Drug levels (last 3 results):  Recent Labs   Lab Result Units 10/24/22  0403 10/24/22  2020 10/25/22  1743   Vancomycin, Random ug/mL 12.2 13.1 33.1       Pharmacy will continue to follow and monitor vancomycin.    Please contact pharmacy at extension [phone] for questions regarding this assessment.    Thank you for the consult,   Alysha Treviño       Patient brief summary:  Nayan Hernandez is a 67 y.o. male initiated on antimicrobial therapy with IV Vancomycin for treatment of bacteremia        Drug Allergies:   Review of patient's allergies indicates:  No Known Allergies    Actual Body Weight:   75.5 kg    Renal Function:   Estimated Creatinine Clearance: 46.6 mL/min (based on SCr of 1.4 mg/dL).,     Dialysis Method (if applicable):  SLED and SCUF    CBC (last 72 hours):  Recent Labs   Lab Result Units 10/23/22  0237 10/24/22  0403 10/25/22  0401   WBC K/uL 3.46* 3.18* 3.02*   Hemoglobin g/dL 8.0* 7.4* 7.7*   Hematocrit % 25.6* 23.3* 24.2*   Platelets K/uL 89* 95* 86*   Gran % % 66.7 71.7 58.9   Lymph % % 16.8* 14.5* 26.2   Mono % % 14.2 11.9 13.6   Eosinophil % % 1.7 1.3 1.0   Basophil % % 0.3 0.3 0.3   Differential Method  Automated Automated Automated       Metabolic Panel (last 72 hours):  Recent Labs   Lab Result Units 10/22/22  2001 10/23/22  0237 10/24/22  0001 10/24/22  0403 10/24/22  1447 10/24/22  2020 10/25/22  0401 10/25/22  1435   Sodium mmol/L 137 139 136 139  139 139 137 136  136 137   Potassium mmol/L 4.0 4.0 4.0 4.0  4.0 4.1 4.1  4.1  4.1 4.1   Chloride mmol/L 100 103 104 107  107 107 104 99  99 99   CO2 mmol/L 19* 25 23 21*  21* 26 22* 18*  18* 18*   Glucose mg/dL 81 94 87 86  86 90 96 181*  181* 126*   BUN mg/dL 8 10 8 7*  7* 3* <3* <3*  <3* 4*   Creatinine mg/dL 2.8* 3.0* 2.6* 2.2*  2.2* 1.1 0.8 0.9  0.9 1.4   Albumin g/dL 3.2* 2.7* 2.7* 2.7*  2.7* 2.6* 2.6* 2.6*  2.6* 2.7*   Total Bilirubin mg/dL 1.9* 1.4*  --  1.6*  --   --  1.3*  --    Alkaline Phosphatase U/L 86 74  --  66  --   --  67  --    AST U/L 23 20  --  23  --   --  23  --    ALT U/L 9* 7*  --  9*  --   --  8*  --    Magnesium mg/dL  --  2.0 1.8 1.8  1.8 2.0 1.8 2.5  2.5 2.1   Phosphorus mg/dL  --  3.5 2.5* 2.2*  2.2* 1.1* <1.0* 6.0*  6.0* 5.5*       Vancomycin Administrations:  vancomycin given in the last 96 hours                     vancomycin 1.25 g in dextrose 5% 250 mL IVPB (ready to mix) ()  Restarted 10/25/22 0407     1,250 mg New Bag  0358    vancomycin 1.25 g in dextrose 5% 250 mL IVPB (ready to mix) (mg) 1,250 mg New Bag 10/24/22 1017    vancomycin 1.5 g in dextrose 5 % 250 mL IVPB (ready to mix) (mg) 1,500 mg New Bag 10/22/22 2240                    Microbiologic Results:  Microbiology Results (last 7 days)       Procedure Component Value Units Date/Time    Blood culture [[id number]] Collected: 10/22/22 2000    Order Status: Completed Specimen: Blood Updated: 10/24/22 2212     Blood Culture, Routine No Growth to date      No Growth to date      No Growth to date    Blood culture [[id number]] Collected: 10/22/22 2000    Order Status: Completed Specimen: Blood Updated: 10/24/22 2212     Blood Culture, Routine No Growth to date      No Growth to date      No Growth to date

## 2022-10-25 NOTE — PROGRESS NOTES
[facility] - Cardiac Intensive Care  Nephrology  Progress Note    Patient Name: Nayan Hernandez  MRN: [id number]  Admission Date: 10/21/2022  Hospital Length of Stay: 4 days  Attending Provider: Zeny Grullon MD   Primary Care Physician: Eusebia Brady NP  Principal Problem:Cardiogenic shock    Subjective:     HPI: 68 yo M PMHx of hepatitis C, CHF with 10%EF, CAD, moderate mitral valve regurgitation, moderate tricuspid valve regurgitation, HTN, HLD, pulmonary HTN, end-stage renal disease on HD MWF (last 5 months) who was admitted on 10/21 for hypotension at the dialsis clinic. CXR revealed an enlarging thoracic aneurysm from previous.      Nephrology consulted for ESRD with HD needs    ESRD on hemodialysis  Outpatient HD Information:  -Dialysis modality: Hemodialysis  -Outpatient HD unit: [facility]   -Nephrologist: Dr. Childs  -HD TX days: Monday/Friday, duration of treatment: unk  -Last HD TX prior to hospital admission: 09/29/2022  -Dialysis access: dialysis catheter placed on R femoral   -Residual urine: oliguric   -EDW: ?      Interval History:   Overnight patient became hypotensive. UF decreased and patient placed on Epinephrine and Vasopressin.     Patient agitated and altered. Family to come by and discuss goals of care with primary and palliative care team today.     I- 1.8L  UOP- 0  CRRT-5.3L  Net (-) 3.5L    CVP 34; CI 2.39    Review of patient's allergies indicates:  No Known Allergies  Current Facility-Administered Medications   Medication Frequency    0.9%  NaCl infusion Continuous    albuterol inhaler 2 puff Q6H PRN    aspirin chewable tablet 81 mg Daily    atorvastatin tablet 40 mg Daily    cefepime in dextrose 5 % IVPB 2 g Q12H    dexmedetomidine (PRECEDEX) 400mcg/100mL 0.9% NaCL infusion Continuous    dextrose 10% bolus 125 mL PRN    DOBUtamine 500 mg in D5W 125 mL infusion Continuous    EPINEPHrine 5 mg in dextrose 5% 250 mL infusion (premix) Continuous    glucose chewable  tablet 16 g PRN    morphine 2 mg/mL injection     morphine injection 2 mg Q2H PRN    naloxone 0.4 mg/mL injection 0.02 mg PRN    NORepinephrine 32 mg in dextrose 5 % 250 mL infusion Continuous    NORepinephrine bitartrate-D5W 4 mg/250 mL (16 mcg/mL) infusion Soln     sodium chloride 0.9% flush 10 mL Q12H PRN    vancomycin - pharmacy to dose pharmacy to manage frequency    vasopressin (PITRESSIN) 0.2 Units/mL in dextrose 5 % 100 mL infusion Continuous       Objective:     Vital Signs (Most Recent):  Temp: 100.2 °F (37.9 °C) (10/25/22 1400)  Pulse: 100 (10/25/22 1400)  Resp: (!) 23 (10/25/22 1400)  BP: 98/65 (10/25/22 1400)  SpO2: 95 % (10/25/22 1400)  O2 Device (Oxygen Therapy): nasal cannula (10/25/22 0700)   Vital Signs (24h Range):  Temp:  [92.1 °F (33.4 °C)-100.2 °F (37.9 °C)] 100.2 °F (37.9 °C)  Pulse:  [] 100  Resp:  [12-46] 23  SpO2:  [95 %-100 %] 95 %  BP: ()/(52-80) 98/65  Arterial Line BP: ()/(46-60) 103/54     Weight: 75.5 kg (166 lb 7.2 oz) (10/24/22 0932)  Body mass index is 26.06 kg/m².  Body surface area is 1.89 meters squared.    I/O last 3 completed shifts:  In: 7087.3 [P.O.:150; I.V.:2959.4; IV Piggyback:3977.9]  Out: 57098 [Urine:50; Other:65406]    Physical Exam  Vitals and nursing note reviewed.   Constitutional:       Appearance: He is ill-appearing.      Comments: sedated   HENT:      Head: Normocephalic and atraumatic.      Right Ear: External ear normal.      Left Ear: External ear normal.      Nose: Nose normal.      Mouth/Throat:      Mouth: Mucous membranes are moist.   Eyes:      Conjunctiva/sclera: Conjunctivae normal.   Neck:      Comments: Right IJ triple lumen catheter  Cardiovascular:      Rate and Rhythm: Normal rate and regular rhythm.      Pulses: Normal pulses.      Heart sounds: Normal heart sounds.   Pulmonary:      Effort: No respiratory distress.      Comments: Supplemental oxygen   Abdominal:      General: Bowel sounds are normal.      Tenderness:  There is no abdominal tenderness.   Genitourinary:     Comments: CRRT in progress,  scrotal edema   Musculoskeletal:      Cervical back: Normal range of motion.      Right lower leg: Edema present.      Left lower leg: No edema.   Skin:     General: Skin is warm and dry.   Neurological:      Mental Status: He is disoriented.      Motor: Weakness present.      Comments: Sedated    Psychiatric:      Comments: Disoriented, Sedated and in soft wrist restraints to prevent pulling lines  Unable to assess thought content and judgement        Significant Labs:  All labs within the past 24 hours have been reviewed.     Significant Imaging:  Labs: Reviewed    Assessment/Plan:     * Cardiogenic shock  Per primary    Ascending aortic dissection  - per primary    ESRD (end stage renal disease)  Outpatient HD Information:  -Dialysis modality: Hemodialysis  -Outpatient HD unit: [facility]   -Nephrologist: Dr. Childs  -HD TX days: Monday/Friday, duration of treatment: unk  -Last HD TX prior to hospital admission: 09/29/2022  -Dialysis access: dialysis catheter placed on R femoral   -Residual urine: oliguric   - Admitted for hypotension and found to have enlarging thoracic aneurysm from previous  - Cardiogenic shock on dobutamine   Recommendations  - CRRT/SLED for volume removal and metabolic clearance.   - Continue Citrate infusion  - Obtain iCal q8hrs; repeat pending  - RFP q6hrs  - Avoid nephrotoxins  - renally dose all meds.   - Guarded prognosis, agree with goals of care discussion with family.             Rest of care per primary.      Thank you for your consult. I will follow-up with patient. Please contact us if you have any additional questions.   .  Case discussed with attending. Attestation to follow.       Florencio Ayala DO  Nephrology  [facility] - Cardiac Intensive [facility]

## 2022-10-25 NOTE — PLAN OF CARE
[facility] - Cardiac Intensive Care  Initial Discharge Assessment       Primary Care Provider: Eusebia Brady NP    Admission Diagnosis: Shortness of breath [R06.02]  Hypoxia [R09.02]  Volume overload [E87.70]  Chest pain [R07.9]  Hypotension, unspecified hypotension type [I95.9]  Ascending aortic aneurysm, unspecified whether ruptured [I71.21]    Admission Date: 10/21/2022  Expected Discharge Date: 10/28/2022    Discharge Barriers Identified: Other (see comments)    Payor: [employer] / Plan: [employer] MEDICARE HMO / Product Type: Medicare Advantage /     Extended Emergency Contact Information  Primary Emergency Contact: Kate Dickey  Address: [address]           [place] [address] [facility]  Home Phone: [phone]  Mobile Phone: [phone]  Relation: Daughter  Preferred language: English    Discharge Plan A: Other (TBD)  Discharge Plan B: Skilled Nursing Facility      [facility], [place] - [address] [facility]  [address] [facility] [address]  Phone: [phone] Fax: [phone]    [id number] - [place] - [address] AT [facility] [address] [place]  [address]  [place] [address]  Phone: [phone] Fax: [phone]      Initial Assessment (most recent)       Adult Discharge Assessment - 10/24/22 1336          Discharge Assessment    Assessment Type Discharge Planning Assessment     Confirmed/corrected address, phone number and insurance Yes     Confirmed Demographics Correct on Facesheet     Source of Information patient     If unable to respond/provide information was family/caregiver contacted? No Contact Information Available     Prior to hospitilization cognitive status: Alert/Oriented     Current cognitive status: Unable to Assess;Not Oriented to Time;Not Oriented to Place     Are you on dialysis? Yes     Discharge Plan A Other   TBD    Discharge Plan B Skilled Nursing Facility     DME Needed Upon Discharge  none      Discharge Plan discussed with: Patient     Discharge Barriers Identified Other (see comments)                          CM attempted to do DPEA but pt refused to talk and at times more confused, unable to reach family at time will continue my attempts.    Vandana Guzmán, HILLARYN, RN    [phone]

## 2022-10-25 NOTE — PROGRESS NOTES
[facility] - Cardiac Intensive Care  Palliative Medicine  Progress Note    Patient Name: Nayan Hernandez  MRN: [id number]  Admission Date: 10/21/2022  Hospital Length of Stay: 4 days  Code Status: DNR   Attending Provider: Zeny Grullon MD  Consulting Provider: HOLDEN Fink  Primary Care Physician: Eusebia Brady NP  Principal Problem:Cardiogenic shock    Patient information was obtained from relative(s), past medical records and primary team.      Assessment/Plan:     Palliative care encounter  Palliative care consulted for goals of care and advanced care planning for Mr. Hernandez a 66 yo gentleman with hx of chronic ascending and descending dissecting aortic aneurysm with ESRD - dialysis dependent.  Initially admitted to hospital medicine with hypotension and transitioned to CICU with concerns for cardiogenic shock. Significant decline over night now requiring Dobutamine and vasopressors: noepinephrine, epinephrine and vasopressin.  Poorly tolerating CRRT secondary to continued hypotension.  Increased confusion and agitation with precedex.  Appears uncomfortable with facial grimacing, moaning and frequent verbal outbursts. Supplemental oxygen 2 liters nasal cannula with sats 99% to 100%      Advance Care Planning   Goals of Care  - Mr. Hernandez unable to participate in ACP conversation   - HPOA has been received. Gilbert Dickey [phone] is designated decision maker.   - Resuscitation status - full code per primary team     Goals of Care   - Palliative care follow up to goals of care.  Met with gilbert Love at bedside. Palliative care introduced.  Daughter is amenable to advanced care planning and goals of care discussions.  - Daughter had an limited  understanding of current clinical condition.  She expressed worry about having to make a serious conversation so quickly.  States she is worried her family will not understand as she does not fully understand the medical terms  -  Palliative care APRN provided information in very basic terms. Expressed worry that the current medications are not working enough to make significant changes.  Gently explained her father is in the dying phase.    - Daughter is hesitant to consent to DNR order. Palliative care discussed the risks, benefits and Mr. Hernandez's survivability of CPR.  Pal care expressed concern that the patient would not survive CPR and recommended having a DNR order. - Explained the current medications would be continued.  Also discussed no escalation of care  - Daughter amenable to DNR order and no escalation of care.  Daughter has requested not to disclose DNR status to the extended family.   - Additional family to arrive later today.  Daughter is amenable to having further advanced care planning and goals of care discussions   -  -    Symptom Management  Pain  - patient unable to verbalize pain level   - appears uncomfortable - verbal outburst, agitation  - family not amenable to full comfort measures.   - family is amenable to increasing frequency of morphine  - explained goal is comfort not to hasten death or quiet the patient   Recommendations   Change to   Morphine 2 mg IVP every 2 hrs as needed for pain and or dyspnea     Dyspnea   Supplemental oxygen  - 2 liters nasal cannula   - sats  97% - 100 %   - see above     Hypotension/ Chronic aortic dissection  /Cardiogenic shock   - Managed per primary team and specialty consultants   - CTS evaluated and found to have no surgical interventions  - continue current plan of care    - Palliative care following for goals of care  -      ESRD  - managed per primary team and specialty consultants   - CRRT in progress   - continue current plan of care   - pal care following for goals of care -      Plan/Recommendations    - family not amenable to full comfort measures but is amenable to continuing current plan of care with no escalation  - daughter is amenable to DNR order - primary team  "to place order  - daughter has requested not to disclose DNR status to other family.  - Waiting on additional family to arrive and family is amenable to further goals of care discussions   - emotional support provided       Dr. Grullon notified of the above discussions and recommendations             .        I will follow-up with patient. Please contact us if you have any additional questions.    Subjective:     Chief Complaint:   Chief Complaint   Patient presents with    Hypotension     Patient presents from dialysis via EMS for further evaluation of low blood pressure. EMS reports that patient was unable to receive dialysis session secondary to hypotension and dialysis RN concern that right groin hemodialysis access site is infected. Last session was on Wednesday. Patient is A&ox4 and following commands.        HPI:   HPI obtained from chart review:     Mr. Hernandez is a 62 yo gentleman with PMH of:  HCV, HFrEF 10%-15% w/ MR + TR, pHTN, HTN, HLD, ESRD MWF (last 5 months) with permacath in right groin, pAF not on AC, chronic Type B dissection s/p intervention.     Presented to ED from H D clinic  hypotension and inability to tolerate HD.  BP 96/58, mentation intact.  Reports  shortness of breath,  and  new scrotal edema.     Reports he will feel better when t"he gets fluid off".  No c/o  chest pain, abdominal pain, headaches, vision changes.  Reports able to make urine and his has become more difficult - straining. No c/o fevers, chills, N/V/D. He is making stool appropriately.     ED   Imagining   CXR -  concerning  for widening of mediastinum.   CT of chest abdomen and pelvis: indicated ascending  and descending aortic dissection    CT surgery consulted  -  patient with  chronic ascending and descending dissection. With no surgical intervention Critical care consulted  or hypoxia  and found to be stable for the floor after evaluation given patient with normal SpO2 on NC.     Nephrology consulted for dialysis " needs. Recommended 500 mL bolus for hypotension. Found to have Lactic acid 2.8.     Patient admitted to hospital medicine for volume overload, hypotension, and dialysis needs.  Experienced increasing lactic acidosis - cardiology consulted and patient transferred to CICU  for closer monitoring.      Patient is not known to palliative care - consulted for goals of care and advanced care planning.                            Hospital Course:  No notes on file    Interval History: unable to tolerate HD, on SLED, pressor requirements increased,  - now with three agents,  increased agitation with precedex,  family meeting today - amenable to DNR.    Past Medical History:   Diagnosis Date    Aortic dissection     [place] Type A     Cardiac defibrillator in place     CHF (congestive heart failure) 12/15/14    EF 15-20%    Coronary artery disease     ETOH abuse     Hepatitis C antibody test positive     Hypertension     S/P aortic dissection repair 2011       Past Surgical History:   Procedure Laterality Date    CORONARY ARTERY BYPASS GRAFT         Review of patient's allergies indicates:  No Known Allergies    Medications:  Continuous Infusions:   sodium chloride 0.9% 5 mL/hr at 10/25/22 1200    dexmedetomidine (PRECEDEX) infusion 1.4 mcg/kg/hr (10/25/22 1200)    DOBUTamine IV infusion (non-titrating) 5 mcg/kg/min (10/25/22 1200)    EPINEPHrine 0.2 mcg/kg/min (10/25/22 1200)    NORepinephrine bitartrate-D5W 0.02 mcg/kg/min (10/25/22 1036)    vasopressin 0.04 Units/min (10/25/22 1228)     Scheduled Meds:   aspirin  81 mg Oral Daily    atorvastatin  40 mg Oral Daily    ceFEPime (MAXIPIME) IVPB  2 g Intravenous Q12H    morphine        NORepinephrine bitartrate-D5W         PRN Meds:albuterol, dextrose 10%, glucose, morphine, naloxone, sodium chloride 0.9%, Pharmacy to dose Vancomycin consult **AND** vancomycin - pharmacy to dose    Family History    No significant family history reported        Tobacco Use     Smoking status: Every Day    Smokeless tobacco: Never   Substance and Sexual Activity    Alcohol use: Yes     Alcohol/week: 0.0 standard drinks     Types: 1 - 2 drink(s) per week    Drug use: Not on file    Sexual activity: Not on file       Review of Systems   Unable to perform ROS: Mental status change   Objective:     Vital Signs (Most Recent):  Temp: 98.4 °F (36.9 °C) (10/25/22 1200)  Pulse: 90 (10/25/22 1200)  Resp: 20 (10/25/22 1224)  BP: (!) 83/57 (10/25/22 1200)  SpO2: 99 % (10/25/22 1200)   Vital Signs (24h Range):  Temp:  [92.1 °F (33.4 °C)-98.4 °F (36.9 °C)] 98.4 °F (36.9 °C)  Pulse:  [] 90  Resp:  [12-46] 20  SpO2:  [95 %-100 %] 99 %  BP: ()/(52-80) 83/57  Arterial Line BP: ()/(46-60) 97/53     Weight: 75.5 kg (166 lb 7.2 oz)  Body mass index is 26.06 kg/m².    Physical Exam  Vitals and nursing note reviewed.   Constitutional:       Appearance: He is ill-appearing.      Comments: sedated   HENT:      Head: Normocephalic and atraumatic.      Right Ear: External ear normal.      Left Ear: External ear normal.      Nose: Nose normal.      Mouth/Throat:      Mouth: Mucous membranes are moist.   Eyes:      Conjunctiva/sclera: Conjunctivae normal.   Neck:      Comments: Right IJ triple lumen catheter  Cardiovascular:      Rate and Rhythm: Normal rate and regular rhythm.      Pulses: Normal pulses.      Heart sounds: Normal heart sounds.   Pulmonary:      Effort: No respiratory distress.      Comments: Supplemental oxygen   Abdominal:      General: Bowel sounds are normal.      Tenderness: There is no abdominal tenderness.   Genitourinary:     Comments: CRRT in progress,  scrotal edema   Musculoskeletal:      Cervical back: Normal range of motion.      Right lower leg: Edema present.      Left lower leg: No edema.   Skin:     General: Skin is warm and dry.   Neurological:      Mental Status: He is disoriented.      Motor: Weakness present.      Comments: Sedated    Psychiatric:       Comments: Disoriented, Sedated and in soft wrist restraints to prevent pulling lines  Unable to assess thought content and judgement        Review of Symptoms      Symptom Assessment (ESAS 0-10 Scale)  Pain:  0  Dyspnea:  0  Anxiety:  0  Nausea:  0  Depression:  0  Anorexia:  0  Fatigue:  0  Insomnia:  0  Restlessness:  0  Agitation:  0 due to Mental status change     Cam / Delirium: unable to participate in exam.  Constipation:  Negative  Diarrhea:  Negative      Bowel Management Plan (BMP):  No      Pain Assessment:  OME in 24 hours:  0  Location(s):      Pain Assessment in Advanced Demential Scale (PAINAD)   Breathing - Independent of vocalization:  0  Negative vocalization:  0  Facial expression:  0  Body language:  0  Consolability:  0  Total:  0    DEREK Scale (Modified): patient unable to participate in conversation.    Performance Status:  20    Living Arrangements:  Lives alone    Psychosocial/Cultural: Lives alone, recently released from custodial system, retired from [employer]. Not , 5 adult children, 3 daughters and 2 sons, enjoys watching television.  Family support from daughter Kate.     Spiritual:  F - Qiana and Belief:  Not addressed, patient unable to participate in conversation, no family present   A - Address in Care:  Not addressed at this time       Advance Care Planning        Significant Labs: All pertinent labs within the past 24 hours have been reviewed.  CBC:   Recent Labs   Lab 10/25/22  0401   WBC 3.02*   HGB 7.7*   HCT 24.2*   MCV 90   PLT 86*       BMP:  Recent Labs   Lab 10/25/22  0401   *  181*     136   K 4.1  4.1   CL 99  99   CO2 18*  18*   BUN <3*  <3*   CREATININE 0.9  0.9   CALCIUM 8.4*  8.4*   MG 2.5  2.5       LFT:  Lab Results   Component Value Date    AST 23 10/25/2022    ALKPHOS 67 10/25/2022    BILITOT 1.3 (H) 10/25/2022     Albumin:   Albumin   Date Value Ref Range Status   10/25/2022 2.6 (L) 3.5 - 5.2 g/dL Final   10/25/2022 2.6  (L) 3.5 - 5.2 g/dL Final     Protein:   Total Protein   Date Value Ref Range Status   10/25/2022 6.7 6.0 - 8.4 g/dL Final     Lactic acid:   Lab Results   Component Value Date    LACTATE 2.2 10/23/2022    LACTATE 5.0 (HH) 10/22/2022       Significant Imaging: I have reviewed all pertinent imaging results/findings within the past 24 hours.    Additional 30 mins spent in advanced care planning     Kristel Watson, CNS  Palliative Medicine  [facility] - Cardiac Intensive Care

## 2022-10-26 NOTE — SUBJECTIVE & OBJECTIVE
Interval History: Patient remains altered on exam today and requiring pressor support. Patient now with increasing o2 requirements likely due to fluid overload.     Review of Systems   Reason unable to perform ROS: AMS.   Objective:     Vital Signs (Most Recent):  Temp: 98.1 °F (36.7 °C) (10/26/22 1400)  Pulse: 102 (10/26/22 1400)  Resp: (!) 27 (10/26/22 1400)  BP: 108/72 (10/26/22 1400)  SpO2: 100 % (10/26/22 1400)   Vital Signs (24h Range):  Temp:  [97.7 °F (36.5 °C)-98.6 °F (37 °C)] 98.1 °F (36.7 °C)  Pulse:  [] 102  Resp:  [16-36] 27  SpO2:  [98 %-100 %] 100 %  BP: ()/(58-84) 108/72  Arterial Line BP: ()/(55-72) 107/67     Weight: 72 kg (158 lb 11.7 oz)  Body mass index is 24.85 kg/m².     SpO2: 100 %  O2 Device (Oxygen Therapy): nasal cannula      Intake/Output Summary (Last 24 hours) at 10/26/2022 1620  Last data filed at 10/26/2022 0600  Gross per 24 hour   Intake 1275.15 ml   Output --   Net 1275.15 ml         Lines/Drains/Airways       Central Venous Catheter Line  Duration                  Hemodialysis Catheter right femoral -- days    Percutaneous Central Line Insertion/Assessment - Triple Lumen  10/23/22 0106 right internal jugular 3 days              Arterial Line  Duration             Arterial Line 10/25/22 0329 Right Radial 1 day                    Physical Exam  Vitals reviewed.   Constitutional:       General: He is in acute distress.      Appearance: He is ill-appearing.   Cardiovascular:      Rate and Rhythm: Regular rhythm. Tachycardia present.      Pulses: Normal pulses.      Heart sounds: Normal heart sounds.   Pulmonary:      Breath sounds: No wheezing.      Comments: Tachypnic  Abdominal:      General: Bowel sounds are normal. There is no distension.      Palpations: Abdomen is soft. There is no mass.   Musculoskeletal:      Right lower leg: No edema.      Left lower leg: No edema.   Skin:     General: Skin is warm.      Coloration: Skin is not pale.      Findings: No  erythema or rash.   Neurological:      Mental Status: He is disoriented.       Significant Labs: All pertinent lab results from the last 24 hours have been reviewed.    Significant Imaging: Echocardiogram: Transthoracic echo (TTE) complete (Cupid Only):   Results for orders placed or performed during the hospital encounter of 10/21/22   Echo   Result Value Ref Range    Ascending aorta 2.68 cm    STJ 2.92 cm    AV mean gradient 2 mmHg    Ao peak bull 0.93 m/s    Ao VTI 15.53 cm    IVS 0.73 0.6 - 1.1 cm    LA size 4.85 cm    Left Atrium Major Axis 6.78 cm    Left Atrium Minor Axis 6.75 cm    LVIDd 6.18 (A) 3.5 - 6.0 cm    LVIDs 5.75 (A) 2.1 - 4.0 cm    LVOT diameter 2.08 cm    LVOT peak VTI 9.63 cm    Posterior Wall 0.71 0.6 - 1.1 cm    MV Peak E Bull 1.18 m/s    RA Major Axis 7.29 cm    RA Width 6.72 cm    RVDD 6.75 cm    Sinus 3.17 cm    TAPSE 1.51 cm    TR Max Bull 2.69 m/s    TDI LATERAL 0.07 m/s    TDI SEPTAL 0.04 m/s    LA WIDTH 4.40 cm    LV Diastolic Volume 192.89 mL    LV Systolic Volume 163.13 mL    RV S' 5.76 cm/s    LVOT peak bull 0.63 m/s    LA volume (mod) 94.40 cm3    LV LATERAL E/E' RATIO 16.86 m/s    LV SEPTAL E/E' RATIO 29.50 m/s    FS 7 %    LA volume 122.71 cm3    LV mass 172.34 g    Left Ventricle Relative Wall Thickness 0.23 cm    AV valve area 2.11 cm2    AV Velocity Ratio 0.68     AV index (prosthetic) 0.62     Mean e' 0.06 m/s    LVOT area 3.4 cm2    LVOT stroke volume 32.71 cm3    AV peak gradient 3 mmHg    E/E' ratio 21.45 m/s    LV Systolic Volume Index 87.2 mL/m2    LV Diastolic Volume Index 103.15 mL/m2    LA Volume Index 65.6 mL/m2    LV Mass Index 92 g/m2    Triscuspid Valve Regurgitation Peak Gradient 29 mmHg    LA Volume Index (Mod) 50.5 mL/m2    BSA 1.89 m2    Right Atrial Pressure (from IVC) 15 mmHg    QEF 18 %    EF 20 %    TV rest pulmonary artery pressure 44 mmHg    Narrative    · The estimated ejection fraction is 20%. . No thrombus is present .   Contrast used.  · The  quantitatively derived ejection fraction is 18%.  · The left ventricle is mildly enlarged with severely decreased systolic   function.  · There is severe left ventricular global hypokinesis.  · Moderate right ventricular enlargement with mildly to moderately reduced   right ventricular systolic function.  · Severe left atrial enlargement.  · Moderate mitral regurgitation.  · Massive tricuspid regurgitation (greater then severe). VC is 1 cm.  · Left ventricular diastolic dysfunction.  · There is pulmonary hypertension.  · The estimated PA systolic pressure is 49 mmHg.  · Elevated central venous pressure (15 mmHg). IVC is gerater then 4 cm. RA   presure is likely 20 mmHg.

## 2022-10-26 NOTE — SUBJECTIVE & OBJECTIVE
Interval History: DNR written per primary team. Pressors continued,  CRRT stopped,  symptoms - pain, dyspnea, agitation managed.  Daughter to return today     Past Medical History:   Diagnosis Date    Aortic dissection     [place] Type A     Cardiac defibrillator in place     CHF (congestive heart failure) 12/15/14    EF 15-20%    Coronary artery disease     ETOH abuse     Hepatitis C antibody test positive     Hypertension     S/P aortic dissection repair 2011       Past Surgical History:   Procedure Laterality Date    CORONARY ARTERY BYPASS GRAFT         Review of patient's allergies indicates:  No Known Allergies    Medications:  Continuous Infusions:   sodium chloride 0.9% 5 mL/hr at 10/26/22 0600    dexmedetomidine (PRECEDEX) infusion 1.4 mcg/kg/hr (10/26/22 1013)    DOBUTamine IV infusion (non-titrating) 5 mcg/kg/min (10/26/22 0600)    EPINEPHrine 0.2 mcg/kg/min (10/26/22 1155)    vasopressin 0.04 Units/min (10/26/22 0600)     Scheduled Meds:   aspirin  81 mg Oral Daily    atorvastatin  40 mg Oral Daily    ceFEPime (MAXIPIME) IVPB  2 g Intravenous Q24H     PRN Meds:dextrose 10%, glucose, morphine, sodium chloride 0.9%, Pharmacy to dose Vancomycin consult **AND** vancomycin - pharmacy to dose    Family History    No significant family history reported        Tobacco Use    Smoking status: Every Day    Smokeless tobacco: Never   Substance and Sexual Activity    Alcohol use: Yes     Alcohol/week: 0.0 standard drinks     Types: 1 - 2 drink(s) per week    Drug use: Not on file    Sexual activity: Not on file       Review of Systems   Unable to perform ROS: Mental status change   Objective:     Vital Signs (Most Recent):  Temp: 98.4 °F (36.9 °C) (10/26/22 0900)  Pulse: 95 (10/26/22 0900)  Resp: (!) 28 (10/26/22 0900)  BP: 106/84 (10/26/22 0900)  SpO2: 100 % (10/26/22 1228)   Vital Signs (24h Range):  Temp:  [97.7 °F (36.5 °C)-100.2 °F (37.9 °C)] 98.4 °F (36.9 °C)  Pulse:  [] 95  Resp:  [16-36] 28  SpO2:  [95  %-100 %] 100 %  BP: ()/(59-84) 106/84  Arterial Line BP: ()/(54-72) 100/61     Weight: 72 kg (158 lb 11.7 oz)  Body mass index is 24.85 kg/m².    Physical Exam  Vitals and nursing note reviewed.   Constitutional:       Appearance: He is ill-appearing.      Comments: sedated   HENT:      Head: Normocephalic and atraumatic.      Right Ear: External ear normal.      Left Ear: External ear normal.      Nose: Nose normal.      Mouth/Throat:      Mouth: Mucous membranes are moist.   Eyes:      Conjunctiva/sclera: Conjunctivae normal.   Neck:      Comments: Right IJ triple lumen catheter  Cardiovascular:      Rate and Rhythm: Normal rate and regular rhythm.      Pulses: Normal pulses.      Heart sounds: Normal heart sounds.   Pulmonary:      Effort: No respiratory distress.      Comments: Supplemental oxygen   Abdominal:      General: Bowel sounds are normal.      Tenderness: There is no abdominal tenderness.   Genitourinary:     Comments: CRRT in progress,  scrotal edema   Musculoskeletal:      Cervical back: Normal range of motion.      Right lower leg: Edema present.      Left lower leg: No edema.   Skin:     General: Skin is warm and dry.   Neurological:      Mental Status: He is disoriented.      Motor: Weakness present.      Comments: Sedated    Psychiatric:      Comments: Disoriented, Sedated and in soft wrist restraints to prevent pulling lines  Unable to assess thought content and judgement        Review of Symptoms      Symptom Assessment (ESAS 0-10 Scale)  Pain:  0  Dyspnea:  0  Anxiety:  0  Nausea:  0  Depression:  0  Anorexia:  0  Fatigue:  0  Insomnia:  0  Restlessness:  0  Agitation:  0 due to Mental status change     Cam / Delirium: unable to participate in exam.  Constipation:  Negative  Diarrhea:  Negative      Bowel Management Plan (BMP):  No      Pain Assessment:  OME in 24 hours:  0  Location(s):      Pain Assessment in Advanced Demential Scale (PAINAD)   Breathing - Independent of  vocalization:  0  Negative vocalization:  0  Facial expression:  0  Body language:  0  Consolability:  0  Total:  0    DEREK Scale (Modified): patient unable to participate in conversation.    Performance Status:  20    Living Arrangements:  Lives alone    Psychosocial/Cultural: Lives alone, recently released from [facility] system, retired from [employer]. Not , 5 adult children, 3 daughters and 2 sons, enjoys watching television.  Family support from daughter Kate.     Spiritual:  F - Qiana and Belief:  Not addressed, patient unable to participate in conversation, no family present   A - Address in Care:  Not addressed at this time       Advance Care Planning        Significant Labs: All pertinent labs within the past 24 hours have been reviewed.  CBC:   Recent Labs   Lab 10/26/22  0345   WBC 4.58   HGB 8.2*   HCT 25.5*   MCV 89   PLT 89*       BMP:  Recent Labs   Lab 10/26/22  0345   *      K 4.4      CO2 18*   BUN 7*   CREATININE 2.1*   CALCIUM 8.9   MG 2.1       LFT:  Lab Results   Component Value Date    AST 50 (H) 10/26/2022    ALKPHOS 74 10/26/2022    BILITOT 1.9 (H) 10/26/2022     Albumin:   Albumin   Date Value Ref Range Status   10/26/2022 2.7 (L) 3.5 - 5.2 g/dL Final     Protein:   Total Protein   Date Value Ref Range Status   10/26/2022 7.1 6.0 - 8.4 g/dL Final     Lactic acid:   Lab Results   Component Value Date    LACTATE 2.2 10/23/2022    LACTATE 5.0 (HH) 10/22/2022       Significant Imaging: I have reviewed all pertinent imaging results/findings within the past 24 hours.

## 2022-10-26 NOTE — PROGRESS NOTES
[facility] - Card[facility]  Palliative Medicine  Progress Note    Patient Name: Nayan Hernandez  MRN: [id number]  Admission Date: 10/21/2022  Hospital Length of Stay: 5 days  Code Status: DNR   Attending Provider: Zeny Grullon MD  Consulting Provider: HOLDEN Fink  Primary Care Physician: Eusebia Brady NP  Principal Problem:Cardiogenic shock    Patient information was obtained from relative(s), past medical records and primary team.      Assessment/Plan:     Palliative care encounter  Palliative care follow up to goals of care and advanced care planning for Mr. 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  Initially admitted to hospital medicine with hypotension and transitioned to CICU with concerns for cardiogenic shock. Significant decline over night now requiring Dobutamine and vasopressors: noepinephrine, epinephrine and vasopressin.  CRRT discontinued. Appears comfortable, no facial grimacing or moaning, precedex continued. Morphine and lorazepam given as ordered. Supplemental oxygen 2 liters nasal cannula with sats 99% to 100%      Advance Care Planning   Goals of Care  - Mr. Hernandez unable to participate in ACP conversation   - HPOA has been received. Daughter Kate Dickey [phone] is designated decision maker.   - Resuscitation status - DNR per primary team  - if able to  Discharge consider [facility]    Goals of Care   -Family is not present at this time.  Mr. Hernandez unable to participate in conversation.    - Anticipating daughter this afternoon and transition to full comfort measures.  - Palliative care will continue to follow     -    Symptom Management  Pain  - patient unable to verbalize pain level   - appears uncomfortable - verbal outburst, agitation  - family not amenable to full comfort measures.   - family is amenable to increasing frequency of morphine  - explained goal is comfort  not to hasten death or quiet the patient   - continue  Morphine 2 mg IVP every 2 hrs as needed for pain and or dyspnea     Dyspnea   Supplemental oxygen  - 2 liters nasal cannula   - sats  97% - 100 %   - see above   - please utilize morphine for air hunger first, rather than lorazepam     Agitation/Anxiety   - continue  To maximize precedex   -Recommend changing lorazepam to 1 mg IVP every 2 hrs as needed for agitation, anxiety    Hypotension/ Chronic aortic dissection  /Cardiogenic shock   - Managed per primary team and specialty consultants   - CTS evaluated and found to have no surgical interventions  - continue current plan of care    - Palliative care following for goals of care  -      ESRD  - managed per primary team and specialty consultants   - CRRT has been discontinued   - continue current plan of care   - pal care following for goals of care -      Plan/Recommendations    - Continue current plan of care, anticipating transition to full comfort measures  - will require hospice education follow up as needed   - Pal med comfort care order set is available to primary team  - patient has dnr order and if able to discharge consider Hoang Grullon and resident team notified of the above discussions and recommendations             .        I will follow-up with patient. Please contact us if you have any additional questions.    Subjective:     Chief Complaint:   Chief Complaint   Patient presents with    Hypotension     Patient presents from dialysis via EMS for further evaluation of low blood pressure. EMS reports that patient was unable to receive dialysis session secondary to hypotension and dialysis RN concern that right groin hemodialysis access site is infected. Last session was on Wednesday. Patient is A&ox4 and following commands.        HPI:   HPI obtained from chart review:     Mr. Hernandez is a 64 yo gentleman with PMH of:  HCV, HFrEF 10%-15% w/ MR + TR, pHTN, HTN, HLD, ESRD MWF (last 5  "months) with permacath in right groin, pAF not on AC, chronic Type B dissection s/p intervention.     Presented to ED from H D clinic  hypotension and inability to tolerate HD.  BP 96/58, mentation intact.  Reports  shortness of breath,  and  new scrotal edema.     Reports he will feel better when t"he gets fluid off".  No c/o  chest pain, abdominal pain, headaches, vision changes.  Reports able to make urine and his has become more difficult - straining. No c/o fevers, chills, N/V/D. He is making stool appropriately.     ED   Imagining   CXR -  concerning  for widening of mediastinum.   CT of chest abdomen and pelvis: indicated ascending  and descending aortic dissection    CT surgery consulted  -  patient with  chronic ascending and descending dissection. With no surgical intervention Critical care consulted  or hypoxia  and found to be stable for the floor after evaluation given patient with normal SpO2 on NC.     Nephrology consulted for dialysis needs. Recommended 500 mL bolus for hypotension. Found to have Lactic acid 2.8.     Patient admitted to hospital medicine for volume overload, hypotension, and dialysis needs.  Experienced increasing lactic acidosis - cardiology consulted and patient transferred to CICU  for closer monitoring.      Patient is not known to palliative care - consulted for goals of care and advanced care planning.                            Hospital Course:  No notes on file    Interval History: DNR written per primary team. Pressors continued,  CRRT stopped,  symptoms - pain, dyspnea, agitation managed.  Daughter to return today     Past Medical History:   Diagnosis Date    Aortic dissection     [place] Type A     Cardiac defibrillator in place     CHF (congestive heart failure) 12/15/14    EF 15-20%    Coronary artery disease     ETOH abuse     Hepatitis C antibody test positive     Hypertension     S/P aortic dissection repair 2011       Past Surgical History:   Procedure " Laterality Date    CORONARY ARTERY BYPASS GRAFT         Review of patient's allergies indicates:  No Known Allergies    Medications:  Continuous Infusions:   sodium chloride 0.9% 5 mL/hr at 10/26/22 0600    dexmedetomidine (PRECEDEX) infusion 1.4 mcg/kg/hr (10/26/22 1013)    DOBUTamine IV infusion (non-titrating) 5 mcg/kg/min (10/26/22 0600)    EPINEPHrine 0.2 mcg/kg/min (10/26/22 1155)    vasopressin 0.04 Units/min (10/26/22 0600)     Scheduled Meds:   aspirin  81 mg Oral Daily    atorvastatin  40 mg Oral Daily    ceFEPime (MAXIPIME) IVPB  2 g Intravenous Q24H     PRN Meds:dextrose 10%, glucose, morphine, sodium chloride 0.9%, Pharmacy to dose Vancomycin consult **AND** vancomycin - pharmacy to dose    Family History    No significant family history reported        Tobacco Use    Smoking status: Every Day    Smokeless tobacco: Never   Substance and Sexual Activity    Alcohol use: Yes     Alcohol/week: 0.0 standard drinks     Types: 1 - 2 drink(s) per week    Drug use: Not on file    Sexual activity: Not on file       Review of Systems   Unable to perform ROS: Mental status change   Objective:     Vital Signs (Most Recent):  Temp: 98.4 °F (36.9 °C) (10/26/22 0900)  Pulse: 95 (10/26/22 0900)  Resp: (!) 28 (10/26/22 0900)  BP: 106/84 (10/26/22 0900)  SpO2: 100 % (10/26/22 1228)   Vital Signs (24h Range):  Temp:  [97.7 °F (36.5 °C)-100.2 °F (37.9 °C)] 98.4 °F (36.9 °C)  Pulse:  [] 95  Resp:  [16-36] 28  SpO2:  [95 %-100 %] 100 %  BP: ()/(59-84) 106/84  Arterial Line BP: ()/(54-72) 100/61     Weight: 72 kg (158 lb 11.7 oz)  Body mass index is 24.85 kg/m².    Physical Exam  Vitals and nursing note reviewed.   Constitutional:       Appearance: He is ill-appearing.      Comments: sedated   HENT:      Head: Normocephalic and atraumatic.      Right Ear: External ear normal.      Left Ear: External ear normal.      Nose: Nose normal.      Mouth/Throat:      Mouth: Mucous membranes are moist.    Eyes:      Conjunctiva/sclera: Conjunctivae normal.   Neck:      Comments: Right IJ triple lumen catheter  Cardiovascular:      Rate and Rhythm: Normal rate and regular rhythm.      Pulses: Normal pulses.      Heart sounds: Normal heart sounds.   Pulmonary:      Effort: No respiratory distress.      Comments: Supplemental oxygen   Abdominal:      General: Bowel sounds are normal.      Tenderness: There is no abdominal tenderness.   Genitourinary:     Comments: CRRT in progress,  scrotal edema   Musculoskeletal:      Cervical back: Normal range of motion.      Right lower leg: Edema present.      Left lower leg: No edema.   Skin:     General: Skin is warm and dry.   Neurological:      Mental Status: He is disoriented.      Motor: Weakness present.      Comments: Sedated    Psychiatric:      Comments: Disoriented, Sedated and in soft wrist restraints to prevent pulling lines  Unable to assess thought content and judgement        Review of Symptoms      Symptom Assessment (ESAS 0-10 Scale)  Pain:  0  Dyspnea:  0  Anxiety:  0  Nausea:  0  Depression:  0  Anorexia:  0  Fatigue:  0  Insomnia:  0  Restlessness:  0  Agitation:  0 due to Mental status change     Cam / Delirium: unable to participate in exam.  Constipation:  Negative  Diarrhea:  Negative      Bowel Management Plan (BMP):  No      Pain Assessment:  OME in 24 hours:  0  Location(s):      Pain Assessment in Advanced Demential Scale (PAINAD)   Breathing - Independent of vocalization:  0  Negative vocalization:  0  Facial expression:  0  Body language:  0  Consolability:  0  Total:  0    DEREK Scale (Modified): patient unable to participate in conversation.    Performance Status:  20    Living Arrangements:  Lives alone    Psychosocial/Cultural: Lives alone, recently released from [employer] system, retired from [employer]. Not , 5 adult children, 3 daughters and 2 sons, enjoys watching television.  Family support from daughter Kate.      Spiritual:  F - Qiana and Belief:  Not addressed, patient unable to participate in conversation, no family present   A - Address in Care:  Not addressed at this time       Advance Care Planning        Significant Labs: All pertinent labs within the past 24 hours have been reviewed.  CBC:   Recent Labs   Lab 10/26/22  0345   WBC 4.58   HGB 8.2*   HCT 25.5*   MCV 89   PLT 89*       BMP:  Recent Labs   Lab 10/26/22  0345   *      K 4.4      CO2 18*   BUN 7*   CREATININE 2.1*   CALCIUM 8.9   MG 2.1       LFT:  Lab Results   Component Value Date    AST 50 (H) 10/26/2022    ALKPHOS 74 10/26/2022    BILITOT 1.9 (H) 10/26/2022     Albumin:   Albumin   Date Value Ref Range Status   10/26/2022 2.7 (L) 3.5 - 5.2 g/dL Final     Protein:   Total Protein   Date Value Ref Range Status   10/26/2022 7.1 6.0 - 8.4 g/dL Final     Lactic acid:   Lab Results   Component Value Date    LACTATE 2.2 10/23/2022    LACTATE 5.0 (HH) 10/22/2022       Significant Imaging: I have reviewed all pertinent imaging results/findings within the past 24 hours.        Kristel Watson, CNS  Palliative Medicine  [facility] - [facility]

## 2022-10-26 NOTE — PLAN OF CARE
Pt disoriented. Intermittently non-responsive. Pt will wake up agitated and screaming. PRN morphine and ativan given for comfort and restlessness as needed. Afebrile. Epi, vaso, and dobutamine infusions continue. CRRT remains off. Pt anuric. Utilizing 4L nasal cannula. Ativan given for air hunger.     Problem: Adult Inpatient Plan of Care  Goal: Plan of Care Review  Outcome: Ongoing, Progressing  Goal: Optimal Comfort and Wellbeing  Outcome: Ongoing, Progressing     Problem: Infection  Goal: Absence of Infection Signs and Symptoms  Outcome: Ongoing, Progressing     Problem: Electrolyte Imbalance (Chronic Kidney Disease)  Goal: Electrolyte Balance  Outcome: Ongoing, Progressing     Problem: Fall Injury Risk  Goal: Absence of Fall and Fall-Related Injury  Outcome: Ongoing, Progressing

## 2022-10-26 NOTE — PROGRESS NOTES
Spoke with Kate that morning about patient status. I stated the significance of patient's altered respiration, cognitive status and labile blood pressure. Kate stated that she would be present early this evening. Currently no family has been by to see patient at this time.

## 2022-10-26 NOTE — SUBJECTIVE & OBJECTIVE
Interval History: Patient remains altered on exam today and requiring pressor support. Patient now with increasing o2 requirements likely due to fluid overload.     Review of Systems   Reason unable to perform ROS: AMS.   Objective:     Vital Signs (Most Recent):  Temp: 98.1 °F (36.7 °C) (10/26/22 1400)  Pulse: 102 (10/26/22 1400)  Resp: (!) 27 (10/26/22 1400)  BP: 108/72 (10/26/22 1400)  SpO2: 100 % (10/26/22 1400)   Vital Signs (24h Range):  Temp:  [97.7 °F (36.5 °C)-98.6 °F (37 °C)] 98.1 °F (36.7 °C)  Pulse:  [] 102  Resp:  [16-36] 27  SpO2:  [98 %-100 %] 100 %  BP: ()/(58-84) 108/72  Arterial Line BP: ()/(55-72) 107/67     Weight: 72 kg (158 lb 11.7 oz)  Body mass index is 24.85 kg/m².     SpO2: 100 %  O2 Device (Oxygen Therapy): nasal cannula      Intake/Output Summary (Last 24 hours) at 10/26/2022 1616  Last data filed at 10/26/2022 0600  Gross per 24 hour   Intake 1275.15 ml   Output --   Net 1275.15 ml         Lines/Drains/Airways       Central Venous Catheter Line  Duration                  Hemodialysis Catheter right femoral -- days    Percutaneous Central Line Insertion/Assessment - Triple Lumen  10/23/22 0106 right internal jugular 3 days              Arterial Line  Duration             Arterial Line 10/25/22 0329 Right Radial 1 day                    Physical Exam  Vitals reviewed.   Constitutional:       General: He is in acute distress.      Appearance: He is ill-appearing.   Cardiovascular:      Rate and Rhythm: Regular rhythm. Tachycardia present.      Pulses: Normal pulses.      Heart sounds: Normal heart sounds.   Pulmonary:      Breath sounds: No wheezing.      Comments: Tachypnic  Abdominal:      General: Bowel sounds are normal. There is no distension.      Palpations: Abdomen is soft. There is no mass.   Musculoskeletal:      Right lower leg: No edema.      Left lower leg: No edema.   Skin:     General: Skin is warm.      Coloration: Skin is not pale.      Findings: No  erythema or rash.   Neurological:      Mental Status: He is disoriented.       Significant Labs: All pertinent lab results from the last 24 hours have been reviewed.    Significant Imaging: Echocardiogram: Transthoracic echo (TTE) complete ([facility] Only):   Results for orders placed or performed during the hospital encounter of 10/21/22   Echo   Result Value Ref Range    Ascending aorta 2.68 cm    STJ 2.92 cm    AV mean gradient 2 mmHg    Ao peak bull 0.93 m/s    Ao VTI 15.53 cm    IVS 0.73 0.6 - 1.1 cm    LA size 4.85 cm    Left Atrium Major Axis 6.78 cm    Left Atrium Minor Axis 6.75 cm    LVIDd 6.18 (A) 3.5 - 6.0 cm    LVIDs 5.75 (A) 2.1 - 4.0 cm    LVOT diameter 2.08 cm    LVOT peak VTI 9.63 cm    Posterior Wall 0.71 0.6 - 1.1 cm    MV Peak E Bull 1.18 m/s    RA Major Axis 7.29 cm    RA Width 6.72 cm    RVDD 6.75 cm    Sinus 3.17 cm    TAPSE 1.51 cm    TR Max Bull 2.69 m/s    TDI LATERAL 0.07 m/s    TDI SEPTAL 0.04 m/s    LA WIDTH 4.40 cm    LV Diastolic Volume 192.89 mL    LV Systolic Volume 163.13 mL    RV S' 5.76 cm/s    LVOT peak bull 0.63 m/s    LA volume (mod) 94.40 cm3    LV LATERAL E/E' RATIO 16.86 m/s    LV SEPTAL E/E' RATIO 29.50 m/s    FS 7 %    LA volume 122.71 cm3    LV mass 172.34 g    Left Ventricle Relative Wall Thickness 0.23 cm    AV valve area 2.11 cm2    AV Velocity Ratio 0.68     AV index (prosthetic) 0.62     Mean e' 0.06 m/s    LVOT area 3.4 cm2    LVOT stroke volume 32.71 cm3    AV peak gradient 3 mmHg    E/E' ratio 21.45 m/s    LV Systolic Volume Index 87.2 mL/m2    LV Diastolic Volume Index 103.15 mL/m2    LA Volume Index 65.6 mL/m2    LV Mass Index 92 g/m2    Triscuspid Valve Regurgitation Peak Gradient 29 mmHg    LA Volume Index (Mod) 50.5 mL/m2    BSA 1.89 m2    Right Atrial Pressure (from IVC) 15 mmHg    QEF 18 %    EF 20 %    TV rest pulmonary artery pressure 44 mmHg    Narrative    · The estimated ejection fraction is 20%. . No thrombus is present .   Contrast used.  · The  quantitatively derived ejection fraction is 18%.  · The left ventricle is mildly enlarged with severely decreased systolic   function.  · There is severe left ventricular global hypokinesis.  · Moderate right ventricular enlargement with mildly to moderately reduced   right ventricular systolic function.  · Severe left atrial enlargement.  · Moderate mitral regurgitation.  · Massive tricuspid regurgitation (greater then severe). VC is 1 cm.  · Left ventricular diastolic dysfunction.  · There is pulmonary hypertension.  · The estimated PA systolic pressure is 49 mmHg.  · Elevated central venous pressure (15 mmHg). IVC is gerater then 4 cm. RA   presure is likely 20 mmHg.

## 2022-10-26 NOTE — PROGRESS NOTES
[facility]  Cardiology  Progress Note    Patient Name: Nayan Hernandez  MRN: [id number]  Admission Date: 10/21/2022  Hospital Length of Stay: 5 days  Code Status: DNR   Attending Physician: Zeny Grullon MD   Primary Care Physician: Eusebia Brady NP  Expected Discharge Date: 10/28/2022  Principal Problem:Cardiogenic shock    Subjective:     Hospital Course:   Mr. Hernandez presented to [facility] from dialysis clinic for hypotensive episode during HD. Patient with known history of type B aortic aneurysm s/p repair in 2011 with CT CAP showing progressive aortic dissection extending to the right common iliac and left proximal internal iliac artery. Patient is not a candidate for repair given high mortality with procedure. Patient placed on dobutamine for concern of cardiogenic shock. He has a SVP consistently of 28. Patient unable to tolerate CRRT, now requiring , Epi, and Vaso. CXR with worsening pulmonary congestion. Per discussions with daughter (Kate), will make patient DNR but will not transition to comfort care at this time.      Interval History: Patient remains altered on exam today and requiring pressor support. Patient now with increasing o2 requirements likely due to fluid overload.     Review of Systems   Reason unable to perform ROS: AMS.   Objective:     Vital Signs (Most Recent):  Temp: 98.1 °F (36.7 °C) (10/26/22 1400)  Pulse: 102 (10/26/22 1400)  Resp: (!) 27 (10/26/22 1400)  BP: 108/72 (10/26/22 1400)  SpO2: 100 % (10/26/22 1400)   Vital Signs (24h Range):  Temp:  [97.7 °F (36.5 °C)-98.6 °F (37 °C)] 98.1 °F (36.7 °C)  Pulse:  [] 102  Resp:  [16-36] 27  SpO2:  [98 %-100 %] 100 %  BP: ()/(58-84) 108/72  Arterial Line BP: ()/(55-72) 107/67     Weight: 72 kg (158 lb 11.7 oz)  Body mass index is 24.85 kg/m².     SpO2: 100 %  O2 Device (Oxygen Therapy): nasal cannula      Intake/Output Summary (Last 24 hours) at 10/26/2022 1620  Last data filed at 10/26/2022  0600  Gross per 24 hour   Intake 1275.15 ml   Output --   Net 1275.15 ml         Lines/Drains/Airways       Central Venous Catheter Line  Duration                  Hemodialysis Catheter right femoral -- days    Percutaneous Central Line Insertion/Assessment - Triple Lumen  10/23/22 0106 right internal jugular 3 days              Arterial Line  Duration             Arterial Line 10/25/22 0329 Right Radial 1 day                    Physical Exam  Vitals reviewed.   Constitutional:       General: He is in acute distress.      Appearance: He is ill-appearing.   Cardiovascular:      Rate and Rhythm: Regular rhythm. Tachycardia present.      Pulses: Normal pulses.      Heart sounds: Normal heart sounds.   Pulmonary:      Breath sounds: No wheezing.      Comments: Tachypnic  Abdominal:      General: Bowel sounds are normal. There is no distension.      Palpations: Abdomen is soft. There is no mass.   Musculoskeletal:      Right lower leg: No edema.      Left lower leg: No edema.   Skin:     General: Skin is warm.      Coloration: Skin is not pale.      Findings: No erythema or rash.   Neurological:      Mental Status: He is disoriented.       Significant Labs: All pertinent lab results from the last 24 hours have been reviewed.    Significant Imaging: Echocardiogram: Transthoracic echo (TTE) complete (Cupid Only):   Results for orders placed or performed during the hospital encounter of 10/21/22   Echo   Result Value Ref Range    Ascending aorta 2.68 cm    STJ 2.92 cm    AV mean gradient 2 mmHg    Ao peak bull 0.93 m/s    Ao VTI 15.53 cm    IVS 0.73 0.6 - 1.1 cm    LA size 4.85 cm    Left Atrium Major Axis 6.78 cm    Left Atrium Minor Axis 6.75 cm    LVIDd 6.18 (A) 3.5 - 6.0 cm    LVIDs 5.75 (A) 2.1 - 4.0 cm    LVOT diameter 2.08 cm    LVOT peak VTI 9.63 cm    Posterior Wall 0.71 0.6 - 1.1 cm    MV Peak E Bull 1.18 m/s    RA Major Axis 7.29 cm    RA Width 6.72 cm    RVDD 6.75 cm    Sinus 3.17 cm    TAPSE 1.51 cm    TR Max Bull  2.69 m/s    TDI LATERAL 0.07 m/s    TDI SEPTAL 0.04 m/s    LA WIDTH 4.40 cm    LV Diastolic Volume 192.89 mL    LV Systolic Volume 163.13 mL    RV S' 5.76 cm/s    LVOT peak trever 0.63 m/s    LA volume (mod) 94.40 cm3    LV LATERAL E/E' RATIO 16.86 m/s    LV SEPTAL E/E' RATIO 29.50 m/s    FS 7 %    LA volume 122.71 cm3    LV mass 172.34 g    Left Ventricle Relative Wall Thickness 0.23 cm    AV valve area 2.11 cm2    AV Velocity Ratio 0.68     AV index (prosthetic) 0.62     Mean e' 0.06 m/s    LVOT area 3.4 cm2    LVOT stroke volume 32.71 cm3    AV peak gradient 3 mmHg    E/E' ratio 21.45 m/s    LV Systolic Volume Index 87.2 mL/m2    LV Diastolic Volume Index 103.15 mL/m2    LA Volume Index 65.6 mL/m2    LV Mass Index 92 g/m2    Triscuspid Valve Regurgitation Peak Gradient 29 mmHg    LA Volume Index (Mod) 50.5 mL/m2    BSA 1.89 m2    Right Atrial Pressure (from IVC) 15 mmHg    QEF 18 %    EF 20 %    TV rest pulmonary artery pressure 44 mmHg    Narrative    · The estimated ejection fraction is 20%. . No thrombus is present .   Contrast used.  · The quantitatively derived ejection fraction is 18%.  · The left ventricle is mildly enlarged with severely decreased systolic   function.  · There is severe left ventricular global hypokinesis.  · Moderate right ventricular enlargement with mildly to moderately reduced   right ventricular systolic function.  · Severe left atrial enlargement.  · Moderate mitral regurgitation.  · Massive tricuspid regurgitation (greater then severe). VC is 1 cm.  · Left ventricular diastolic dysfunction.  · There is pulmonary hypertension.  · The estimated PA systolic pressure is 49 mmHg.  · Elevated central venous pressure (15 mmHg). IVC is gerater then 4 cm. RA   presure is likely 20 mmHg.        Assessment and Plan:       * Cardiogenic shock  Refer to 'acute on chronic systolic HF'    Paroxysmal atrial fibrillation  ECG with atrial fibrillation. Patient reports he previous hx of atrial fib. Afib  from OSH was documented as of early this month.   CHADSVASc 3.     - Anticoagulation held in previous admission given hx of significant aneurysmal dilatation of the thoracic aorta. Continue to hold.  - Ensure to monitor and correct electrolytes    Acute on chronic systolic heart failure  Nayan Hernandez is a 62 yo M hx of  HCV, HFrEF ( EF 10% w/ MR + TR), pHTN, HTN, HLD, ESRD MWF (within the past month) with permacath in right groin, pAF however not on AC, chronic Type B dissection s/p intervention 2011, non-compliance (poor follow up, missed HD sessions) admitted for hypotension to hospital medicine,  that has now improved with short course of IVF. The patient was then transfered to the CICU for concern of cargioneck shock due to concern for somnolence, with rising lactic acidosis with change in clinical status.     HDS on pressor support  SVO2:40  CVP: 34  CO: 3.87  CI:2.1  SVR: 764  Inotropes/ Vasopressors:  @ 5., Epi 0.06, Vaso 0.04    Plan  - Continue pressor support per family  - Unable to tolerate CRRT  - Hold midodrine for now  - MAP >65 goal       Ascending aortic dissection  Patient with known chronic dissection involving the aortic arch and descending aorta. CTA this admit demonstrated below:   The CTA C/A/P demonstrated aortic dissection extending to the right common iliac and left proximal internal iliac artery with significant aneurysmal dilatation of the thoracic aorta and dissection flap involves the proximal right subclavian artery and also left renal artery.    - CTS evalauted the patient and deemed him a non-surgical candidate  - Monitor BP and avoid hypertensive episodes.     ESRD (end stage renal disease)  - Last HD session was 10/22 with removal of 3L  - Pt became hypotensive while receiving CRRT  - Now with pressor requirements      VTE Risk Mitigation (From admission, onward)         Ordered     IP VTE HIGH RISK PATIENT  Once         10/21/22 0244                Lenka Burrell  MD  Cardiology  [facility]

## 2022-10-26 NOTE — ASSESSMENT & PLAN NOTE
Palliative care follow up to goals of care and advanced care planning for Mr. Hernandez a 66 yo gentleman with hx of chronic ascending and descending dissecting aortic aneurysm with ESRD - dialysis dependent.  Initially admitted to hospital medicine with hypotension and transitioned to CICU with concerns for cardiogenic shock. Significant decline over night now requiring Dobutamine and vasopressors: noepinephrine, epinephrine and vasopressin.  CRRT discontinued. Appears comfortable, no facial grimacing or moaning, precedex continued. Morphine and lorazepam given as ordered. Supplemental oxygen 2 liters nasal cannula with sats 99% to 100%      Advance Care Planning   Goals of Care  - Mr. Hernandez unable to participate in ACP conversation   - HPOA has been received. Daughter Kate Dickey [phone] is designated decision maker.   - Resuscitation status - DNR per primary team  - if able to  Discharge consider [facility]    Goals of Care   -Family is not present at this time.  Mr. Hernandez unable to participate in conversation.    - Anticipating daughter this afternoon and transition to full comfort measures.  - Palliative care will continue to follow     -     Symptom Management  Pain  - patient unable to verbalize pain level   - appears uncomfortable - verbal outburst, agitation  - family not amenable to full comfort measures.   - family is amenable to increasing frequency of morphine  - explained goal is comfort not to hasten death or quiet the patient   - continue  Morphine 2 mg IVP every 2 hrs as needed for pain and or dyspnea     Dyspnea   Supplemental oxygen  - 2 liters nasal cannula   - sats  97% - 100 %   - see above   - please utilize morphine for air hunger first, rather than lorazepam     Agitation/Anxiety   - continue  To maximize precedex   -Recommend changing lorazepam to 1 mg IVP every 2 hrs as needed for agitation, anxiety    Hypotension/ Chronic aortic dissection  /Cardiogenic shock   - Managed per  primary team and specialty consultants   - CTS evaluated and found to have no surgical interventions  - continue current plan of care    - Palliative care following for goals of care  -      ESRD  - managed per primary team and specialty consultants   - CRRT has been discontinued   - continue current plan of care   - pal care following for goals of care -      Plan/Recommendations    - Continue current plan of care, anticipating transition to full comfort measures  - will require hospice education follow up as needed   - Pal med comfort care order set is available to primary team  - patient has dnr order and if able to discharge consider Hoang Grullon and resident team notified of the above discussions and recommendations             .

## 2022-10-26 NOTE — ASSESSMENT & PLAN NOTE
- Last HD session was 10/22 with removal of 3L  - Pt became hypotensive while receiving CRRT  - Now with pressor requirements

## 2022-10-26 NOTE — PROGRESS NOTES
Pharmacokinetic Assessment Follow Up: IV Vancomycin    Vancomycin serum concentration assessment(s):    The random level was drawn correctly and can be used to guide therapy at this time. The measurement is above the desired definitive target range of 15 to 20 mcg/mL.  Patient has elected to stop CRRT     Vancomycin Regimen Plan:    Do not redose vancomycin given supratherapeutic level   Redraw vancomycin level tomorrow with AM labs and redose per level and renal function.     Drug levels (last 3 results):  Recent Labs   Lab Result Units 10/24/22  2020 10/25/22  1743 10/26/22  0345   Vancomycin, Random ug/mL 13.1 33.1 32.1       Pharmacy will continue to follow and monitor vancomycin.    Please contact pharmacy at extension [phone] for questions regarding this assessment.    Thank you for the consult,   Trupti Ba, PharmD, BCPS, [facility] Clinical Pharmacy Specialist  EXT [phone]       Patient brief summary:  Nayan Hernandez is a 67 y.o. male initiated on antimicrobial therapy with IV Vancomycin for treatment of bacteremia    The patient's current regimen is pulse dosing    Drug Allergies:   Review of patient's allergies indicates:  No Known Allergies    Actual Body Weight:   72kg    Renal Function:   Estimated Creatinine Clearance: 31 mL/min (A) (based on SCr of 2.1 mg/dL (H)).,     Dialysis Method (if applicable):  N/A    CBC (last 72 hours):  Recent Labs   Lab Result Units 10/24/22  0403 10/25/22  0401 10/26/22  0345   WBC K/uL 3.18* 3.02* 4.58   Hemoglobin g/dL 7.4* 7.7* 8.2*   Hematocrit % 23.3* 24.2* 25.5*   Platelets K/uL 95* 86* 89*   Gran % % 71.7 58.9 66.8   Lymph % % 14.5* 26.2 15.5*   Mono % % 11.9 13.6 16.4*   Eosinophil % % 1.3 1.0 0.9   Basophil % % 0.3 0.3 0.2   Differential Method  Automated Automated Automated       Metabolic Panel (last 72 hours):  Recent Labs   Lab Result Units 10/24/22  0001 10/24/22  0403 10/24/22  1447 10/24/22  2020 10/25/22  0401 10/25/22  1435  10/26/22  0345   Sodium mmol/L 136 139  139 139 137 136  136 137 136   Potassium mmol/L 4.0 4.0  4.0 4.1 4.1 4.1  4.1 4.1 4.4   Chloride mmol/L 104 107  107 107 104 99  99 99 100   CO2 mmol/L 23 21*  21* 26 22* 18*  18* 18* 18*   Glucose mg/dL 87 86  86 90 96 181*  181* 126* 129*   BUN mg/dL 8 7*  7* 3* <3* <3*  <3* 4* 7*   Creatinine mg/dL 2.6* 2.2*  2.2* 1.1 0.8 0.9  0.9 1.4 2.1*   Albumin g/dL 2.7* 2.7*  2.7* 2.6* 2.6* 2.6*  2.6* 2.7* 2.7*   Total Bilirubin mg/dL  --  1.6*  --   --  1.3*  --  1.9*   Alkaline Phosphatase U/L  --  66  --   --  67  --  74   AST U/L  --  23  --   --  23  --  50*   ALT U/L  --  9*  --   --  8*  --  17   Magnesium mg/dL 1.8 1.8  1.8 2.0 1.8 2.5  2.5 2.1 2.1   Phosphorus mg/dL 2.5* 2.2*  2.2* 1.1* <1.0* 6.0*  6.0* 5.5* 6.2*       Vancomycin Administrations:  vancomycin given in the last 96 hours                     vancomycin 1.25 g in dextrose 5% 250 mL IVPB (ready to mix) ()  Restarted 10/25/22 0407     1,250 mg New Bag  0358    vancomycin 1.25 g in dextrose 5% 250 mL IVPB (ready to mix) (mg) 1,250 mg New Bag 10/24/22 1017    vancomycin 1.5 g in dextrose 5 % 250 mL IVPB (ready to mix) (mg) 1,500 mg New Bag 10/22/22 2240                    Microbiologic Results:  Microbiology Results (last 7 days)       Procedure Component Value Units Date/Time    Blood culture [[id number]] Collected: 10/22/22 2000    Order Status: Completed Specimen: Blood Updated: 10/25/22 2212     Blood Culture, Routine No Growth to date      No Growth to date      No Growth to date      No Growth to date    Blood culture [[id number]] Collected: 10/22/22 2000    Order Status: Completed Specimen: Blood Updated: 10/25/22 2212     Blood Culture, Routine No Growth to date      No Growth to date      No Growth to date      No Growth to date

## 2022-10-26 NOTE — ASSESSMENT & PLAN NOTE
Nayan Hernandez is a 64 yo M hx of  HCV, HFrEF ( EF 10% w/ MR + TR), pHTN, HTN, HLD, ESRD MWF (within the past month) with permacath in right groin, pAF however not on AC, chronic Type B dissection s/p intervention 2011, non-compliance (poor follow up, missed HD sessions) admitted for hypotension to hospital medicine,  that has now improved with short course of IVF. The patient was then transfered to the CICU for concern of cargioneck shock due to concern for somnolence, with rising lactic acidosis with change in clinical status.     HDS on pressor support  SVO2:40  CVP: 34  CO: 3.87  CI:2.1  SVR: 764  Inotropes/ Vasopressors:  @ 5., Epi 0.06, Vaso 0.04    Plan  - Continue pressor support per family  - Unable to tolerate CRRT  - Hold midodrine for now  - MAP >65 goal

## 2022-10-26 NOTE — NURSING
"Pt woke up screaming, "I want it off" repeatedly for 10 minutes at the top of his lungs. He was not redirectable Called team to update. PRN ativan ordered. Attempted to call daughter x2 got busy signal and then mailbox full.   "

## 2022-10-26 NOTE — CARE UPDATE
Notified that family has made patient DNR, and have requested to discontinue CRRT. Per primary team family to come by today and likely make patient comfort measures.     Nephrology to sign off at this time. Please re consult if needed.

## 2022-10-27 PROBLEM — I71.21 ASCENDING AORTIC ANEURYSM: Status: RESOLVED | Noted: 2022-01-01 | Resolved: 2022-01-01

## 2022-10-27 PROBLEM — I95.9 HYPOTENSION: Status: RESOLVED | Noted: 2022-01-01 | Resolved: 2022-01-01

## 2022-10-27 PROBLEM — I50.23 ACUTE ON CHRONIC SYSTOLIC HEART FAILURE: Status: RESOLVED | Noted: 2022-01-01 | Resolved: 2022-01-01

## 2022-10-27 NOTE — PROGRESS NOTES
[facility]  Palliative Medicine  Progress Note    Patient Name: Nayan Hernandez  MRN: [id number]  Admission Date: 10/21/2022  Hospital Length of Stay: 6 days  Code Status: DNR   Attending Provider: Zeny Grullon MD  Consulting Provider: HOLDEN Fink  Primary Care Physician: Eusebia Brady NP  Principal Problem:Cardiogenic shock    Patient information was obtained from primary team.      Assessment/Plan:     Palliative care encounter  Palliative care follow up to goals of care and advanced care planning for Mr. 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  Initially admitted to hospital medicine with hypotension and transitioned to CICU with concerns for cardiogenic shock. Significant decline over night now requiring Dobutamine and vasopressors: noepinephrine, epinephrine and vasopressin.  CRRT discontinued. Appears comfortable, no facial grimacing or moaning, precedex continued. Morphine and lorazepam given as ordered. Supplemental oxygen 2 liters nasal cannula with sats 99% to 100%      Advance Care Planning   Goals of Care  - Mr. Hernandez unable to participate in ACP conversation   - HPOA has been received. Daughter Kate Dickey [phone] is designated decision maker.   - Resuscitation status - DNR per primary team  - if able to  Discharge consider [facility]    Goals of Care   -Family is not present at this time.  Mr. Hernandez unable to participate in conversation.  Attempted to reach daughter by telephone, message left   - during previous pal med encounter, explained to family it is probable that the patient will die in the hospital. Daughter expressed her wish that the patient not suffer.   - Daughter appeared accepting of risk to die in the hospital   - Anticipating daughter this afternoon and transition to full comfort measures.  - emotional support has been provided  - Palliative  care will continue to follow     -    Symptom Management  Pain  - OME 30   - patient unable to verbalize pain level   - appears comfortable at time of this encounter.  No facial grimacing or moaning, no apparent respiratory distress .   - family has not made decisions regarding transition to comfort care.   - it has been  explained goal is comfort not to hasten death or quiet the patient   - continue  Morphine 2 mg IVP every 2 hrs as needed for pain and or dyspnea     Dyspnea   Supplemental oxygen  - 2 liters nasal cannula   - sats  97% - 100 %   - see above   - please utilize morphine for air hunger first, rather than lorazepam     Agitation/Anxiety   - continue  To maximize precedex   - continue prn lorazepam     Hypotension/ Chronic aortic dissection  /Cardiogenic shock   - Managed per primary team and specialty consultants   - CTS evaluated and found to have no surgical interventions  - continue current plan of care    - Palliative care following for goals of care  -      ESRD  - managed per primary team and specialty consultants   - CRRT has been discontinued   - continue current plan of care   - pal care following for goals of care -      Plan/Recommendations    - Continue current plan of care,  - current orders for symptom management appear adequate   -Pal med comfort care order set is available to primary team  - pal med will continue to follow       Dr. Grullon and resident team notified of the above discussions and recommendations             .        I will follow-up with patient. Please contact us if you have any additional questions.    Subjective:     Chief Complaint:   Chief Complaint   Patient presents with    Hypotension     Patient presents from dialysis via EMS for further evaluation of low blood pressure. EMS reports that patient was unable to receive dialysis session secondary to hypotension and dialysis RN concern that right groin hemodialysis access site is infected. Last session was on  "Wednesday. Patient is A&ox4 and following commands.        HPI:   HPI obtained from chart review:     Mr. Hernandez is a 64 yo gentleman with PMH of:  HCV, HFrEF 10%-15% w/ MR + TR, pHTN, HTN, HLD, ESRD MWF (last 5 months) with permacath in right groin, pAF not on AC, chronic Type B dissection s/p intervention.     Presented to ED from H D clinic  hypotension and inability to tolerate HD.  BP 96/58, mentation intact.  Reports  shortness of breath,  and  new scrotal edema.     Reports he will feel better when t"he gets fluid off".  No c/o  chest pain, abdominal pain, headaches, vision changes.  Reports able to make urine and his has become more difficult - straining. No c/o fevers, chills, N/V/D. He is making stool appropriately.     ED   Imagining   CXR -  concerning  for widening of mediastinum.   CT of chest abdomen and pelvis: indicated ascending  and descending aortic dissection    CT surgery consulted  -  patient with  chronic ascending and descending dissection. With no surgical intervention Critical care consulted  or hypoxia  and found to be stable for the floor after evaluation given patient with normal SpO2 on NC.     Nephrology consulted for dialysis needs. Recommended 500 mL bolus for hypotension. Found to have Lactic acid 2.8.     Patient admitted to hospital medicine for volume overload, hypotension, and dialysis needs.  Experienced increasing lactic acidosis - cardiology consulted and patient transferred to CICU  for closer monitoring.      Patient is not known to palliative care - consulted for goals of care and advanced care planning.                            Hospital Course:  No notes on file    Interval History: no adverse events overnight.  Prn medications given for pain and agitation, appears comfortable this AM, anticipating further goals of care discussion with daugther.     Medications:  Continuous Infusions:   sodium chloride 0.9% 5 mL/hr at 10/27/22 0609    dexmedetomidine (PRECEDEX) " infusion 1.4 mcg/kg/hr (10/27/22 1506)    DOBUTamine IV infusion (non-titrating) 5 mcg/kg/min (10/27/22 0609)    EPINEPHrine 0.2 mcg/kg/min (10/27/22 1154)    vasopressin 0.04 Units/min (10/27/22 0841)     Scheduled Meds:   aspirin  81 mg Oral Daily    atorvastatin  40 mg Oral Daily    ceFEPime (MAXIPIME) IVPB  2 g Intravenous Q24H    mupirocin   Nasal BID     PRN Meds:dextrose 10%, glucose, lorazepam, morphine, sodium chloride 0.9%, Pharmacy to dose Vancomycin consult **AND** vancomycin - pharmacy to dose    Objective:     Vital Signs (Most Recent):  Temp: 96.8 °F (36 °C) (10/27/22 1600)  Pulse: 97 (10/27/22 1600)  Resp: (!) 24 (10/27/22 1600)  BP: (!) 92/58 (10/27/22 1600)  SpO2: 98 % (10/27/22 1627)   Vital Signs (24h Range):  Temp:  [96.1 °F (35.6 °C)-97.9 °F (36.6 °C)] 96.8 °F (36 °C)  Pulse:  [] 97  Resp:  [12-35] 24  SpO2:  [94 %-100 %] 98 %  BP: ()/(58-77) 92/58  Arterial Line BP: ()/(57-67) 110/63     Weight: 73 kg (160 lb 15 oz)  Body mass index is 25.2 kg/m².    Physical Exam    Review of Symptoms      Symptom Assessment (ESAS 0-10 Scale)  Pain:  0  Dyspnea:  0  Anxiety:  0  Nausea:  0  Depression:  0  Anorexia:  0  Fatigue:  0  Insomnia:  0  Restlessness:  0  Agitation:  0           Advance Care Planning   See assessment and plan     Significant Labs: None  CBC:   Recent Labs   Lab 10/27/22  0310   WBC 5.82   HGB 7.6*   HCT 24.1*   MCV 90   PLT 71*     BMP:  Recent Labs   Lab 10/27/22  0310   *   *   K 4.2      CO2 20*   BUN 12   CREATININE 2.8*   CALCIUM 8.4*     LFT:  Lab Results   Component Value Date     (H) 10/27/2022    ALKPHOS 71 10/27/2022    BILITOT 1.8 (H) 10/27/2022     Albumin:   Albumin   Date Value Ref Range Status   10/27/2022 2.5 (L) 3.5 - 5.2 g/dL Final     Protein:   Total Protein   Date Value Ref Range Status   10/27/2022 6.7 6.0 - 8.4 g/dL Final     Lactic acid:   Lab Results   Component Value Date    LACTATE 2.2 10/23/2022    LACTATE  5.0 () 10/22/2022       Significant Imaging: I have reviewed all pertinent imaging results/findings within the past 24 hours.      Kristel Watson, CNS  Palliative Medicine  [facility]

## 2022-10-27 NOTE — SUBJECTIVE & OBJECTIVE
Interval History: no adverse events overnight.  Prn medications given for pain and agitation, appears comfortable this AM, anticipating further goals of care discussion with daugther.     Medications:  Continuous Infusions:   sodium chloride 0.9% 5 mL/hr at 10/27/22 0609    dexmedetomidine (PRECEDEX) infusion 1.4 mcg/kg/hr (10/27/22 1506)    DOBUTamine IV infusion (non-titrating) 5 mcg/kg/min (10/27/22 0609)    EPINEPHrine 0.2 mcg/kg/min (10/27/22 1154)    vasopressin 0.04 Units/min (10/27/22 0841)     Scheduled Meds:   aspirin  81 mg Oral Daily    atorvastatin  40 mg Oral Daily    ceFEPime (MAXIPIME) IVPB  2 g Intravenous Q24H    mupirocin   Nasal BID     PRN Meds:dextrose 10%, glucose, lorazepam, morphine, sodium chloride 0.9%, Pharmacy to dose Vancomycin consult **AND** vancomycin - pharmacy to dose    Objective:     Vital Signs (Most Recent):  Temp: 96.8 °F (36 °C) (10/27/22 1600)  Pulse: 97 (10/27/22 1600)  Resp: (!) 24 (10/27/22 1600)  BP: (!) 92/58 (10/27/22 1600)  SpO2: 98 % (10/27/22 1627)   Vital Signs (24h Range):  Temp:  [96.1 °F (35.6 °C)-97.9 °F (36.6 °C)] 96.8 °F (36 °C)  Pulse:  [] 97  Resp:  [12-35] 24  SpO2:  [94 %-100 %] 98 %  BP: ()/(58-77) 92/58  Arterial Line BP: ()/(57-67) 110/63     Weight: 73 kg (160 lb 15 oz)  Body mass index is 25.2 kg/m².    Physical Exam    Review of Symptoms      Symptom Assessment (ESAS 0-10 Scale)  Pain:  0  Dyspnea:  0  Anxiety:  0  Nausea:  0  Depression:  0  Anorexia:  0  Fatigue:  0  Insomnia:  0  Restlessness:  0  Agitation:  0           Advance Care Planning   See assessment and plan     Significant Labs: None  CBC:   Recent Labs   Lab 10/27/22  0310   WBC 5.82   HGB 7.6*   HCT 24.1*   MCV 90   PLT 71*     BMP:  Recent Labs   Lab 10/27/22  0310   *   *   K 4.2      CO2 20*   BUN 12   CREATININE 2.8*   CALCIUM 8.4*     LFT:  Lab Results   Component Value Date     (H) 10/27/2022    ALKPHOS 71 10/27/2022    BILITOT 1.8  (H) 10/27/2022     Albumin:   Albumin   Date Value Ref Range Status   10/27/2022 2.5 (L) 3.5 - 5.2 g/dL Final     Protein:   Total Protein   Date Value Ref Range Status   10/27/2022 6.7 6.0 - 8.4 g/dL Final     Lactic acid:   Lab Results   Component Value Date    LACTATE 2.2 10/23/2022    LACTATE 5.0 (HH) 10/22/2022       Significant Imaging: I have reviewed all pertinent imaging results/findings within the past 24 hours.

## 2022-10-27 NOTE — PROGRESS NOTES
Pharmacokinetic Assessment Follow Up: IV Vancomycin    Vancomycin serum concentration assessment(s):    Random level drawn 47 hours post dose.     The random level was drawn correctly and can be used to guide therapy at this time. The measurement is above the desired definitive target range of 15 to 20 mcg/mL.    Vancomycin Regimen Plan:    Re-dose when the random level is less than 20 mcg/mL, next level to be drawn with am labs on 10/28.     Drug levels (last 3 results):  Recent Labs   Lab Result Units 10/25/22  1743 10/26/22  0345 10/27/22  0310   Vancomycin, Random ug/mL 33.1 32.1 29.0       Pharmacy will continue to follow and monitor vancomycin.    Please contact pharmacy at extension [phone] for questions regarding this assessment.    Thank you for the consult,   Natalie Luna       Patient brief summary:  Nayan Hernandez is a 67 y.o. male initiated on antimicrobial therapy with IV Vancomycin for treatment of sepsis    The patient's current regimen is pulse dosing    Drug Allergies:   Review of patient's allergies indicates:  No Known Allergies    Actual Body Weight:   73 kg    Renal Function:   Estimated Creatinine Clearance: 23.3 mL/min (A) (based on SCr of 2.8 mg/dL (H)).,     Dialysis Method (if applicable):  intermittent HD    CBC (last 72 hours):  Recent Labs   Lab Result Units 10/25/22  0401 10/26/22  0345 10/27/22  0310   WBC K/uL 3.02* 4.58 5.82   Hemoglobin g/dL 7.7* 8.2* 7.6*   Hematocrit % 24.2* 25.5* 24.1*   Platelets K/uL 86* 89* 71*   Gran % % 58.9 66.8 75.4*   Lymph % % 26.2 15.5* 9.3*   Mono % % 13.6 16.4* 12.7   Eosinophil % % 1.0 0.9 2.1   Basophil % % 0.3 0.2 0.3   Differential Method  Automated Automated Automated       Metabolic Panel (last 72 hours):  Recent Labs   Lab Result Units 10/24/22  1447 10/24/22  2020 10/25/22  0401 10/25/22  1435 10/26/22  0345 10/27/22  0310   Sodium mmol/L 139 137 136  136 137 136 135*   Potassium mmol/L 4.1 4.1 4.1  4.1 4.1 4.4 4.2   Chloride mmol/L  107 104 99  99 99 100 100   CO2 mmol/L 26 22* 18*  18* 18* 18* 20*   Glucose mg/dL 90 96 181*  181* 126* 129* 119*   BUN mg/dL 3* <3* <3*  <3* 4* 7* 12   Creatinine mg/dL 1.1 0.8 0.9  0.9 1.4 2.1* 2.8*   Albumin g/dL 2.6* 2.6* 2.6*  2.6* 2.7* 2.7* 2.5*   Total Bilirubin mg/dL  --   --  1.3*  --  1.9* 1.8*   Alkaline Phosphatase U/L  --   --  67  --  74 71   AST U/L  --   --  23  --  50* 154*   ALT U/L  --   --  8*  --  17 55*   Magnesium mg/dL 2.0 1.8 2.5  2.5 2.1 2.1  --    Phosphorus mg/dL 1.1* <1.0* 6.0*  6.0* 5.5* 6.2*  --        Vancomycin Administrations:  vancomycin given in the last 96 hours                     vancomycin 1.25 g in dextrose 5% 250 mL IVPB (ready to mix) ()  Restarted 10/25/22 0407     1,250 mg New Bag  0358    vancomycin 1.25 g in dextrose 5% 250 mL IVPB (ready to mix) (mg) 1,250 mg New Bag 10/24/22 1017                    Microbiologic Results:  Microbiology Results (last 7 days)       Procedure Component Value Units Date/Time    Blood culture [[id number]] Collected: 10/22/22 2000    Order Status: Completed Specimen: Blood Updated: 10/26/22 2212     Blood Culture, Routine No Growth to date      No Growth to date      No Growth to date      No Growth to date      No Growth to date    Blood culture [[id number]] Collected: 10/22/22 2000    Order Status: Completed Specimen: Blood Updated: 10/26/22 2212     Blood Culture, Routine No Growth to date      No Growth to date      No Growth to date      No Growth to date      No Growth to date

## 2022-10-27 NOTE — PROGRESS NOTES
[facility] - Cardiac Intensive Care  Cardiology Progress Note    Patient Name: Nayan Hernandez  MRN: [id number]  Admission Date: 10/21/2022  Hospital Length of Stay: 6 days  Code Status: DNR   Attending Physician: Zeny Grullon MD   Primary Care Physician: Eusebia Brady NP  Expected Discharge Date: 10/28/2022  Principal Problem:Cardiogenic shock    Subjective:     Hospital Course:   Mr. Hernandez presented to [facility] from dialysis clinic for hypotensive episode during HD. Patient with known history of type B aortic aneurysm s/p repair in 2011 with CT CAP showing progressive aortic dissection extending to the right common iliac and left proximal internal iliac artery. Patient is not a candidate for repair given high mortality with procedure. Patient placed on dobutamine for concern of cardiogenic shock. He has a SVP consistently of 28. Patient unable to tolerate CRRT, now requiring , Epi, and Vaso. CXR with worsening pulmonary congestion. Per discussions with daughter (Kate), will make patient DNR but will not transition to comfort care at this time.    Interval History: Patient remains altered on exam today and requiring pressor support.    Review of Systems   Reason unable to perform ROS: AMS.   Objective:     Vital Signs (Most Recent):  Temp: 96.6 °F (35.9 °C) (10/27/22 1200)  Pulse: 102 (10/27/22 1200)  Resp: (!) 24 (10/27/22 1200)  BP: 104/72 (10/27/22 1200)  SpO2: 100 % (10/27/22 1200)   Vital Signs (24h Range):  Temp:  [96.6 °F (35.9 °C)-98.2 °F (36.8 °C)] 96.6 °F (35.9 °C)  Pulse:  [] 102  Resp:  [12-28] 24  SpO2:  [94 %-100 %] 100 %  BP: ()/(60-77) 104/72  Arterial Line BP: ()/(57-68) 109/65     Weight: 73 kg (160 lb 15 oz)  Body mass index is 25.2 kg/m².     SpO2: 100 %  O2 Device (Oxygen Therapy): nasal cannula      Intake/Output Summary (Last 24 hours) at 10/27/2022 1251  Last data filed at 10/27/2022 0609  Gross per 24 hour   Intake 2294.11 ml   Output --   Net 2294.11 ml        Physical Exam  Vitals reviewed.   Constitutional:       General: He is in acute distress.      Appearance: He is ill-appearing.   Cardiovascular:      Rate and Rhythm: Regular rhythm. Tachycardia present.      Pulses: Normal pulses.      Heart sounds: Normal heart sounds.   Pulmonary:      Breath sounds: No wheezing.      Comments: Tachypnic  Abdominal:      General: Bowel sounds are normal. There is no distension.      Palpations: Abdomen is soft. There is no mass.   Musculoskeletal:      Right lower leg: No edema.      Left lower leg: No edema.   Skin:     General: Skin is warm.      Coloration: Skin is not pale.      Findings: No erythema or rash.   Neurological:      Mental Status: He is disoriented.       Assessment and Plan:     * Cardiogenic shock  End stage heart failure with end stage renal failure  Palliative  Comfort care on pressors waiting for family conference  Disoriented and calling out in distress    Paroxysmal atrial fibrillation  ECG with atrial fibrillation. Patient reports he previous hx of atrial fib. Afib from OSH was documented as of early this month.   CHADSVASc 3.     - Anticoagulation held in previous admission given hx of significant aneurysmal dilatation of the thoracic aorta. Continue to hold.  - Ensure to monitor and correct electrolytes    Ascending aortic dissection  Patient with known chronic dissection involving the aortic arch and descending aorta. CTA this admit demonstrated below:   The CTA C/A/P demonstrated aortic dissection extending to the right common iliac and left proximal internal iliac artery with significant aneurysmal dilatation of the thoracic aorta and dissection flap involves the proximal right subclavian artery and also left renal artery.    - CTS evalauted the patient and deemed him a non-surgical candidate  - Monitor BP and avoid hypertensive episodes.     HFrEF (heart failure with reduced ejection fraction)  End stage heart failure with end stage renal  failure  Palliative  Comfort care on pressors waiting for family conference  Disoriented and calling out in distress    ESRD (end stage renal disease)  End stage heart failure with end stage renal failure unable to tolerate any CRRT due to hypotension  Palliative  Comfort care on pressors waiting for family conference  Disoriented and calling out in distress      VTE Risk Mitigation (From admission, onward)         Ordered     IP VTE HIGH RISK PATIENT  Once         10/21/22 7892                Vinnie Soni MD  Cardiology  [facility] - Cardiac Int[facility] Care

## 2022-10-27 NOTE — ASSESSMENT & PLAN NOTE
End stage heart failure with end stage renal failure unable to tolerate any CRRT due to hypotension  Palliative  Comfort care on pressors waiting for family conference  Disoriented and calling out in distress

## 2022-10-27 NOTE — ASSESSMENT & PLAN NOTE
End stage heart failure with end stage renal failure  Palliative  Comfort care on pressors waiting for family conference  Disoriented and calling out in distress

## 2022-10-27 NOTE — SUBJECTIVE & OBJECTIVE
Interval History: Patient remains altered on exam today and requiring pressor support.    Review of Systems   Reason unable to perform ROS: AMS.   Objective:     Vital Signs (Most Recent):  Temp: 96.6 °F (35.9 °C) (10/27/22 1200)  Pulse: 102 (10/27/22 1200)  Resp: (!) 24 (10/27/22 1200)  BP: 104/72 (10/27/22 1200)  SpO2: 100 % (10/27/22 1200)   Vital Signs (24h Range):  Temp:  [96.6 °F (35.9 °C)-98.2 °F (36.8 °C)] 96.6 °F (35.9 °C)  Pulse:  [] 102  Resp:  [12-28] 24  SpO2:  [94 %-100 %] 100 %  BP: ()/(60-77) 104/72  Arterial Line BP: ()/(57-68) 109/65     Weight: 73 kg (160 lb 15 oz)  Body mass index is 25.2 kg/m².     SpO2: 100 %  O2 Device (Oxygen Therapy): nasal cannula      Intake/Output Summary (Last 24 hours) at 10/27/2022 1251  Last data filed at 10/27/2022 0609  Gross per 24 hour   Intake 2294.11 ml   Output --   Net 2294.11 ml       Physical Exam  Vitals reviewed.   Constitutional:       General: He is in acute distress.      Appearance: He is ill-appearing.   Cardiovascular:      Rate and Rhythm: Regular rhythm. Tachycardia present.      Pulses: Normal pulses.      Heart sounds: Normal heart sounds.   Pulmonary:      Breath sounds: No wheezing.      Comments: Tachypnic  Abdominal:      General: Bowel sounds are normal. There is no distension.      Palpations: Abdomen is soft. There is no mass.   Musculoskeletal:      Right lower leg: No edema.      Left lower leg: No edema.   Skin:     General: Skin is warm.      Coloration: Skin is not pale.      Findings: No erythema or rash.   Neurological:      Mental Status: He is disoriented.

## 2022-10-27 NOTE — ASSESSMENT & PLAN NOTE
Palliative care follow up to goals of care and advanced care planning for Mr. 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  Initially admitted to hospital medicine with hypotension and transitioned to CICU with concerns for cardiogenic shock. Significant decline over night now requiring Dobutamine and vasopressors: noepinephrine, epinephrine and vasopressin.  CRRT discontinued. Appears comfortable, no facial grimacing or moaning, precedex continued. Morphine and lorazepam given as ordered. Supplemental oxygen 2 liters nasal cannula with sats 99% to 100%      Advance Care Planning   Goals of Care  - Mr. Hernandez unable to participate in ACP conversation   - HPOA has been received. Daughter Kate Dickey [phone] is designated decision maker.   - Resuscitation status - DNR per primary team  - if able to  Discharge consider [facility]    Goals of Care   -Family is not present at this time.  Mr. Hernandez unable to participate in conversation.  Attempted to reach daughter by telephone, message left   - during previous [facility] med encounter, explained to family it is probable that the patient will die in the hospital. Daughter expressed her wish that the patient not suffer.   - Daughter appeared accepting of risk to die in the hospital   - Anticipating daughter this afternoon and transition to full comfort measures.  - emotional support has been provided  - Palliative care will continue to follow     -     Symptom Management  Pain  - OME 30   - patient unable to verbalize pain level   - appears comfortable at time of this encounter.  No facial grimacing or moaning, no apparent respiratory distress .   - family has not made decisions regarding transition to comfort care.   - it has been  explained goal is comfort not to hasten death or quiet the patient   - continue  Morphine 2 mg IVP every 2 hrs as needed for pain and or dyspnea     Dyspnea    Supplemental oxygen  - 2 liters nasal cannula   - sats  97% - 100 %   - see above   - please utilize morphine for air hunger first, rather than lorazepam     Agitation/Anxiety   - continue  To maximize precedex   - continue prn lorazepam     Hypotension/ Chronic aortic dissection  /Cardiogenic shock   - Managed per primary team and specialty consultants   - CTS evaluated and found to have no surgical interventions  - continue current plan of care    - Palliative care following for goals of care  -      ESRD  - managed per primary team and specialty consultants   - CRRT has been discontinued   - continue current plan of care   - pal care following for goals of care -      Plan/Recommendations    - Continue current plan of care,  - current orders for symptom management appear adequate   -Pal med comfort care order set is available to primary team  - pal med will continue to follow       Dr. Grullon and resident team notified of the above discussions and recommendations             .

## 2022-10-28 NOTE — PLAN OF CARE
Cardiac ICU Care Plan    POC reviewed with Nayan Hernandez and family. Questions and concerns addressed by MD on call. No acute events today. Remains comfortable. Pt progressing toward goals. Will continue to monitor. See below and flowsheets for full assessment and VS info.       Neuro:  [place] Coma Scale  Best Eye Response: 1-->(E1) none  Best Motor Response: 4-->(M4) withdraws from pain  Best Verbal Response: 3-->(V3) inappropriate words  [place] Coma Scale Score: 8  Assessment Qualifiers: other (see comments) (medically sedated)  Pupil PERRLA: yes    24 hr Temp:  [96.1 °F (35.6 °C)-98.8 °F (37.1 °C)]      CV:  Rhythm: atrial rhythm   DVT prophylaxis: VTE Required Core Measure: Pharmacological prophylaxis initiated/maintained    CVP (mean): 87 mmHg (10/27/22 1600)       SVO2 (%): 77 % (10/23/22 0300)               Pulses  Right Radial Pulse: 1+ (weak)  Left Radial Pulse: 1+ (weak)  Right Dorsalis Pedis Pulse: 1+ (weak)  Left Dorsalis Pedis Pulse: 1+ (weak)  Right Posterior Tibial Pulse: 1+ (weak)  Left Posterior Tibial Pulse: 1+ (weak)    Resp:  O2 Device (Oxygen Therapy): nasal cannula  Flow (L/min): 4  Oxygen Concentration (%): 36    GI/:  GI prophylaxis: no  Diet/Nutrition Received: NPO  Last Bowel Movement: 10/24/22  Voiding Characteristics: anuria   Intake/Output Summary (Last 24 hours) at 10/28/2022 0639  Last data filed at 10/28/2022 0500  Gross per 24 hour   Intake 2146.4 ml   Output --   Net 2146.4 ml     Treatment Type: Slow continuous ultrafiltration  UF Rate: 400 cc/hr  Nutritional Supplement Intake: Quantity NPO, Type:  NPO    Labs/Accuchecks:  Recent Labs   Lab 10/25/22  0401 10/26/22  0345 10/27/22  0310   WBC 3.02* 4.58 5.82   RBC 2.70* 2.88* 2.69*   HGB 7.7* 8.2* 7.6*   HCT 24.2* 25.5* 24.1*   PLT 86* 89* 71*      Recent Labs   Lab 10/23/22  0257   INR 1.9*   APTT 24.5      Recent Labs     10/27/22  0310   *   K 4.2   CO2 20*      BUN 12   CREATININE 2.8*   ALKPHOS 71   ALT 55*    *   BILITOT 1.8*       Recent Labs   Lab 10/22/22  1849   TROPONINI 0.069*      Recent Labs     10/25/22  0655   PH 7.457*   PCO2 27.8*   PO2 110*   HCO3 19.7*   POCSATURATED 99   BE -4       Electrolytes: No replacement orders  Accuchecks: ACHS    Gtts/LDAs:   sodium chloride 0.9% 5 mL/hr at 10/28/22 0500    dexmedetomidine (PRECEDEX) infusion 1.4 mcg/kg/hr (10/28/22 0544)    DOBUTamine IV infusion (non-titrating) 5 mcg/kg/min (10/28/22 0500)    EPINEPHrine 0.2 mcg/kg/min (10/28/22 0518)    vasopressin 0.04 Units/min (10/28/22 0500)       Lines/Drains/Airways       Central Venous Catheter Line  Duration                  Hemodialysis Catheter right femoral -- days    Percutaneous Central Line Insertion/Assessment - Triple Lumen  10/23/22 0106 right internal jugular 5 days              Arterial Line  Duration             Arterial Line 10/25/22 0329 Right Radial 3 days                    Skin/Wounds  Bathing/Skin Care: incontinence care (10/27/22 2301)  Wounds: No  Wound care consulted: No

## 2022-10-28 NOTE — PROGRESS NOTES
VANCOMYCIN DOSING BY PHARMACY DISCONTINUATION NOTE    Nayan Hernandez is a 67 y.o. male who had been consulted for vancomycin dosing.    The pharmacy consult for vancomycin dosing has been discontinued.     Vancomycin Dosing by Pharmacy Consult will sign-off. Please reconsult if necessary. Thank you for allowing us to participate in this patient's care.       Cassandra Howell Pharm.D  [id number]

## 2022-10-28 NOTE — PLAN OF CARE
Events:     Daughter, grandchildren and roommate arrived to visit Mr. Hernandez. We had an extensive talk about prognosis and recent events including hypotension, hypoxia, hypervolemia, discomfort. We discussed comfort care measures as well including withdrawal of care. She is currently not ready to withdraw care and is giving some time for his sister to come by who lives in [place].   In terms of his care she has stated that she wisheS for Mr. Hernandez to remain a DNR/DNI, no further escalation of drips, hemodialysis. Agrees to PRN morphine and/or ativan for agitation, pain, and so. If patient suddenly becomes hypotensive, pulseless, non responsive she wishes for patient to pass on his own w/o additional life sustaining measures.         -Nursing staff has been updated.           Alma Umanzor MD  Cardiology fellow   [facility]

## 2022-10-28 NOTE — SUBJECTIVE & OBJECTIVE
Interval History: Patient remains altered on exam. MAPS 60 with current pressor support.    Review of Systems   Reason unable to perform ROS: AMS.   Objective:     Vital Signs (Most Recent):  Temp: 97.3 °F (36.3 °C) (10/28/22 0800)  Pulse: 94 (10/28/22 0800)  Resp: (!) 30 (10/28/22 0800)  BP: (!) 85/53 (10/28/22 0800)  SpO2: 96 % (10/28/22 0800)   Vital Signs (24h Range):  Temp:  [96.1 °F (35.6 °C)-98.8 °F (37.1 °C)] 97.3 °F (36.3 °C)  Pulse:  [] 94  Resp:  [11-35] 30  SpO2:  [92 %-100 %] 96 %  BP: ()/(53-72) 85/53  Arterial Line BP: ()/(51-66) 97/57     Weight: 70.5 kg (155 lb 6.8 oz)  Body mass index is 24.34 kg/m².     SpO2: 96 %  O2 Device (Oxygen Therapy): nasal cannula      Intake/Output Summary (Last 24 hours) at 10/28/2022 0914  Last data filed at 10/28/2022 0600  Gross per 24 hour   Intake 2290.35 ml   Output --   Net 2290.35 ml       Physical Exam  Vitals reviewed.   Constitutional:       General: He is in acute distress.      Appearance: He is ill-appearing.   Cardiovascular:      Rate and Rhythm: Regular rhythm. Tachycardia present.      Pulses: Normal pulses.      Heart sounds: Normal heart sounds.   Pulmonary:      Breath sounds: No wheezing.      Comments: Tachypnic  Abdominal:      General: Bowel sounds are normal. There is no distension.      Palpations: Abdomen is soft. There is no mass.   Musculoskeletal:      Right lower leg: No edema.      Left lower leg: No edema.   Skin:     General: Skin is warm.      Coloration: Skin is not pale.      Findings: No erythema or rash.   Neurological:      Mental Status: He is disoriented.

## 2022-10-28 NOTE — PROGRESS NOTES
[facility]  Cardiology  Progress Note    Patient Name: Nayan Hernandez  MRN: [id number]  Admission Date: 10/21/2022  Hospital Length of Stay: 7 days  Code Status: DNR   Attending Physician: Zeny Grullon MD   Primary Care Physician: Eusebia Brady NP  Expected Discharge Date: 10/28/2022  Principal Problem:Cardiogenic shock    Subjective:     Hospital Course:   Mr. Hernandez presented to [facility] [place] from dialysis clinic for hypotensive episode during HD. Patient with known history of type B aortic aneurysm s/p repair in 2011 with CT CAP showing progressive aortic dissection extending to the right common iliac and left proximal internal iliac artery. Patient is not a candidate for repair given high mortality with procedure. Patient placed on dobutamine for concern of cardiogenic shock. He has a SVP consistently of 28. Patient unable to tolerate CRRT, now requiring , Epi, and Vaso. CXR with worsening pulmonary congestion. Per discussions with daughter (Kate), will make patient DNR but will not transition to comfort care at this time.      Interval History: Patient remains altered on exam. MAPS 60 with current pressor support.     Review of Systems   Reason unable to perform ROS: AMS.   Objective:     Vital Signs (Most Recent):  Temp: 97.3 °F (36.3 °C) (10/28/22 0800)  Pulse: 94 (10/28/22 0800)  Resp: (!) 30 (10/28/22 0800)  BP: (!) 85/53 (10/28/22 0800)  SpO2: 96 % (10/28/22 0800)   Vital Signs (24h Range):  Temp:  [96.1 °F (35.6 °C)-98.8 °F (37.1 °C)] 97.3 °F (36.3 °C)  Pulse:  [] 94  Resp:  [11-35] 30  SpO2:  [92 %-100 %] 96 %  BP: ()/(53-72) 85/53  Arterial Line BP: ()/(51-66) 97/57     Weight: 70.5 kg (155 lb 6.8 oz)  Body mass index is 24.34 kg/m².     SpO2: 96 %  O2 Device (Oxygen Therapy): nasal cannula      Intake/Output Summary (Last 24 hours) at 10/28/2022 0914  Last data filed at 10/28/2022 0600  Gross per 24 hour   Intake 2290.35 ml   Output --   Net 2290.35 ml        Physical Exam  Vitals reviewed.   Constitutional:       General: He is in acute distress.      Appearance: He is ill-appearing.   Cardiovascular:      Rate and Rhythm: Regular rhythm. Tachycardia present.      Pulses: Normal pulses.      Heart sounds: Normal heart sounds.   Pulmonary:      Breath sounds: No wheezing.      Comments: Tachypnic  Abdominal:      General: Bowel sounds are normal. There is no distension.      Palpations: Abdomen is soft. There is no mass.   Musculoskeletal:      Right lower leg: No edema.      Left lower leg: No edema.   Skin:     General: Skin is warm.      Coloration: Skin is not pale.      Findings: No erythema or rash.   Neurological:      Mental Status: He is disoriented.       Assessment and Plan:       * Cardiogenic shock  End stage heart failure with end stage renal failure  Palliative  Comfort care on pressors waiting for family conference  Disoriented and calling out in distress    Paroxysmal atrial fibrillation  ECG with atrial fibrillation. Patient reports he previous hx of atrial fib. Afib from OSH was documented as of early this month.   CHADSVASc 3.     - Anticoagulation held in previous admission given hx of significant aneurysmal dilatation of the thoracic aorta. Continue to hold.  - Ensure to monitor and correct electrolytes    Ascending aortic dissection  Patient with known chronic dissection involving the aortic arch and descending aorta. CTA this admit demonstrated below:   The CTA C/A/P demonstrated aortic dissection extending to the right common iliac and left proximal internal iliac artery with significant aneurysmal dilatation of the thoracic aorta and dissection flap involves the proximal right subclavian artery and also left renal artery.    - CTS evalauted the patient and deemed him a non-surgical candidate  - Monitor BP and avoid hypertensive episodes.     HFrEF (heart failure with reduced ejection fraction)  End stage heart failure with end stage renal  failure  Palliative  Comfort care on pressors waiting for family conference  Disoriented and calling out in distress    ESRD (end stage renal disease)  End stage heart failure with end stage renal failure unable to tolerate any CRRT due to hypotension  Palliative  Comfort care on pressors waiting for family conference  Disoriented and calling out in distress        VTE Risk Mitigation (From admission, onward)         Ordered     IP VTE HIGH RISK PATIENT  Once         10/21/22 3699                Lenka Burrell MD  Cardiology  [facility]

## 2022-10-29 PROBLEM — Z51.5 COMFORT MEASURES ONLY STATUS: Status: ACTIVE | Noted: 2022-01-01

## 2022-10-29 NOTE — PROGRESS NOTES
Pt's family at the bedside, including sister, brother in law, daughter, and niece.  Dr. Burrell notified of arrival, stating that the team will be down shortly to speak with the family.      Pt's sister asking about organ donation.  [facility] contacted.  Spoke with Carri with [employer], who stated that pt is not a candidate for organ donation.  Instructed to call back once pt has passed to initiate post-mortem w/u for possible tissue donation.     Spoke with Alfredo Burnham who should be at the bedside shortly.    Dr. Grullon at the bedside.  Comfort care initiated per family request.  Epi, Vaso, and  gtts stopped.  Precedex infusing as ordered.  PRN Morphine and Ativan administered as ordered.

## 2022-10-29 NOTE — ASSESSMENT & PLAN NOTE
Patient with known chronic dissection involving the aortic arch and descending aorta. CTA this admit demonstrated below:   The CTA C/A/P demonstrated aortic dissection extending to the right common iliac and left proximal internal iliac artery with significant aneurysmal dilatation of the thoracic aorta and dissection flap involves the proximal right subclavian artery and also left renal artery.    - CTS evalauted the patient and deemed him a non-surgical candidate  - Comfort care. Pressor support discontinued today.

## 2022-10-29 NOTE — PROGRESS NOTES
[facility]  Cardiology  Progress Note    Patient Name: Nayan Hernandez  MRN: [id number]  Admission Date: 10/21/2022  Hospital Length of Stay: 8 days  Code Status: DNR   Attending Physician: Zeny Grullon MD   Primary Care Physician: Eusebia Brady NP  Expected Discharge Date: 10/30/2022  Principal Problem:Cardiogenic shock    Subjective:     Hospital Course:   Mr. Hernandez presented to [facility] [place] from dialysis clinic for hypotensive episode during HD. Patient with known history of type B aortic aneurysm s/p repair in 2011 with CT CAP showing progressive aortic dissection extending to the right common iliac and left proximal internal iliac artery. Patient is not a candidate for repair given high mortality with procedure. Patient placed on dobutamine for concern of cardiogenic shock. He has a SVP consistently of 28. Patient unable to tolerate CRRT, now requiring , Epi, and Vaso. CXR with worsening pulmonary congestion. Per discussions with daughter (Kate), will make patient DNR but will not transition to comfort care at this time. Family at bedside and decision was made to transition to comfort care on 10/29.        Interval History: Patient remains altered on exam. Family at bedside and decision made to transition to comfort care.     Review of Systems   Reason unable to perform ROS: AMS.   Objective:     Vital Signs (Most Recent):  Temp: (!) 100.4 °F (38 °C) (10/29/22 1305)  Pulse: 88 (10/29/22 1305)  Resp: (!) 23 (10/29/22 1130)  BP: (!) 59/38 (10/29/22 1305)  SpO2: (!) 90 % (10/29/22 1305)   Vital Signs (24h Range):  Temp:  [95.9 °F (35.5 °C)-102.7 °F (39.3 °C)] 100.4 °F (38 °C)  Pulse:  [] 88  Resp:  [20-41] 23  SpO2:  [88 %-99 %] 90 %  BP: (59-86)/(38-60) 59/38  Arterial Line BP: (45-88)/(30-49) 45/30     Weight: 70.5 kg (155 lb 6.8 oz)  Body mass index is 24.34 kg/m².     SpO2: (!) 90 %  O2 Device (Oxygen Therapy): nasal cannula      Intake/Output Summary (Last 24 hours) at  10/29/2022 1411  Last data filed at 10/29/2022 1305  Gross per 24 hour   Intake 3260.67 ml   Output 23 ml   Net 3237.67 ml       Physical Exam  Vitals reviewed.   Constitutional:       General: He is in acute distress.      Appearance: He is ill-appearing.   HENT:      Head:      Comments: Prominent vasculature noted. Elevated JVP  Cardiovascular:      Rate and Rhythm: Regular rhythm. Tachycardia present.      Pulses: Normal pulses.      Heart sounds: Normal heart sounds.   Pulmonary:      Breath sounds: Rhonchi and rales present. No wheezing.      Comments: Tachypnic  Abdominal:      General: Bowel sounds are normal. There is no distension.      Palpations: Abdomen is soft. There is no mass.   Musculoskeletal:         General: Swelling present.   Skin:     General: Skin is warm.      Coloration: Skin is not pale.      Findings: No erythema or rash.   Neurological:      Mental Status: He is disoriented.       Assessment and Plan:         * Cardiogenic shock  End stage heart failure with end stage renal failure  Comfort care. Pressor support discontinued    Paroxysmal atrial fibrillation  ECG with atrial fibrillation. Patient reports he previous hx of atrial fib. Afib from OSH was documented as of early this month.   CHADSVASc 3.     - Comfort care. Pressor support discontinued    Ascending aortic dissection  Patient with known chronic dissection involving the aortic arch and descending aorta. CTA this admit demonstrated below:   The CTA C/A/P demonstrated aortic dissection extending to the right common iliac and left proximal internal iliac artery with significant aneurysmal dilatation of the thoracic aorta and dissection flap involves the proximal right subclavian artery and also left renal artery.    - CTS evalauted the patient and deemed him a non-surgical candidate  - Comfort care. Pressor support discontinued today.     HFrEF (heart failure with reduced ejection fraction)  End stage heart failure with end stage  renal failure  Comfort care. Pressor support discontinued today.     ESRD (end stage renal disease)  End stage heart failure with end stage renal failure unable to tolerate any CRRT due to hypotension  Comfort care. Pressor support discontinued      VTE Risk Mitigation (From admission, onward)    None          Lenka Burrell MD  Cardiology  [facility]

## 2022-10-29 NOTE — SUBJECTIVE & OBJECTIVE
Interval History: Patient remains altered on exam. Family at bedside and decision made to transition to comfort care.     Review of Systems   Reason unable to perform ROS: AMS.   Objective:     Vital Signs (Most Recent):  Temp: (!) 100.4 °F (38 °C) (10/29/22 1305)  Pulse: 88 (10/29/22 1305)  Resp: (!) 23 (10/29/22 1130)  BP: (!) 59/38 (10/29/22 1305)  SpO2: (!) 90 % (10/29/22 1305)   Vital Signs (24h Range):  Temp:  [95.9 °F (35.5 °C)-102.7 °F (39.3 °C)] 100.4 °F (38 °C)  Pulse:  [] 88  Resp:  [20-41] 23  SpO2:  [88 %-99 %] 90 %  BP: (59-86)/(38-60) 59/38  Arterial Line BP: (45-88)/(30-49) 45/30     Weight: 70.5 kg (155 lb 6.8 oz)  Body mass index is 24.34 kg/m².     SpO2: (!) 90 %  O2 Device (Oxygen Therapy): nasal cannula      Intake/Output Summary (Last 24 hours) at 10/29/2022 1411  Last data filed at 10/29/2022 1305  Gross per 24 hour   Intake 3260.67 ml   Output 23 ml   Net 3237.67 ml       Physical Exam  Vitals reviewed.   Constitutional:       General: He is in acute distress.      Appearance: He is ill-appearing.   HENT:      Head:      Comments: Prominent vasculature noted. Elevated JVP  Cardiovascular:      Rate and Rhythm: Regular rhythm. Tachycardia present.      Pulses: Normal pulses.      Heart sounds: Normal heart sounds.   Pulmonary:      Breath sounds: Rhonchi and rales present. No wheezing.      Comments: Tachypnic  Abdominal:      General: Bowel sounds are normal. There is no distension.      Palpations: Abdomen is soft. There is no mass.   Musculoskeletal:         General: Swelling present.   Skin:     General: Skin is warm.      Coloration: Skin is not pale.      Findings: No erythema or rash.   Neurological:      Mental Status: He is disoriented.

## 2022-10-29 NOTE — PLAN OF CARE
Cardiac ICU Care Plan    POC reviewed with Nayan Hernandez and family. Questions and concerns addressed. Pt progressing toward goals. Will continue to monitor. See below and flowsheets for full assessment and VS info.       Neuro:  [place] Coma Scale  Best Eye Response: 2-->(E2) to pain  Best Motor Response: 4-->(M4) withdraws from pain  Best Verbal Response: 3-->(V3) inappropriate words  [place] Coma Scale Score: 9  Assessment Qualifiers: patient chemically sedated or paralyzed  Pupil PERRLA: yes    24 hr Temp:  [95.9 °F (35.5 °C)-102 °F (38.9 °C)]      CV:  Rhythm: atrial rhythm   DVT prophylaxis: VTE Required Core Measure: Pharmacological prophylaxis initiated/maintained    CVP (mean): 87 mmHg (10/27/22 1600)       SVO2 (%): 77 % (10/23/22 0300)               Pulses  Right Radial Pulse: 1+ (weak)  Left Radial Pulse: 1+ (weak)  Right Dorsalis Pedis Pulse: 1+ (weak)  Left Dorsalis Pedis Pulse: 1+ (weak)  Right Posterior Tibial Pulse: 1+ (weak)  Left Posterior Tibial Pulse: 1+ (weak)    Resp:  O2 Device (Oxygen Therapy): nasal cannula w/ humidification  Flow (L/min): 4  Oxygen Concentration (%): 36    GI/:  GI prophylaxis: no  Diet/Nutrition Received: NPO  Last Bowel Movement: 10/24/22  Voiding Characteristics: anuria   Intake/Output Summary (Last 24 hours) at 10/29/2022 0612  Last data filed at 10/29/2022 0605  Gross per 24 hour   Intake 2613.03 ml   Output --   Net 2613.03 ml     Treatment Type: Slow continuous ultrafiltration  UF Rate: 400 cc/hr  Nutritional Supplement Intake: Quantity none, Type: Boost    Labs/Accuchecks:  Recent Labs   Lab 10/25/22  0401 10/26/22  0345 10/27/22  0310   WBC 3.02* 4.58 5.82   RBC 2.70* 2.88* 2.69*   HGB 7.7* 8.2* 7.6*   HCT 24.2* 25.5* 24.1*   PLT 86* 89* 71*      Recent Labs   Lab 10/23/22  0257   INR 1.9*   APTT 24.5      Recent Labs     10/27/22  0310   *   K 4.2   CO2 20*      BUN 12   CREATININE 2.8*   ALKPHOS 71   ALT 55*   *   BILITOT 1.8*        Recent Labs   Lab 10/22/22  1849   TROPONINI 0.069*    No results for input(s): PH, PCO2, PO2, HCO3, POCSATURATED, BE in the last 72 hours.    Electrolytes: Contraindicated  Accuchecks: ACHS    Gtts/LDAs:   sodium chloride 0.9% 5 mL/hr at 10/29/22 0605    dexmedetomidine (PRECEDEX) infusion 1.4 mcg/kg/hr (10/29/22 0605)    DOBUTamine IV infusion (non-titrating) 5 mcg/kg/min (10/29/22 0605)    EPINEPHrine 0.34 mcg/kg/min (10/29/22 0605)    vasopressin 0.04 Units/min (10/29/22 0605)       Lines/Drains/Airways       Central Venous Catheter Line  Duration                  Hemodialysis Catheter right femoral -- days    Percutaneous Central Line Insertion/Assessment - Triple Lumen  10/23/22 0106 right internal jugular 6 days              Arterial Line  Duration             Arterial Line 10/25/22 0329 Right Radial 4 days                    Skin/Wounds  Bathing/Skin Care: bath, complete (10/28/22 0801)  Wounds: No  Wound care consulted: No

## 2022-10-29 NOTE — ASSESSMENT & PLAN NOTE
End stage heart failure with end stage renal failure  Comfort care. Pressor support discontinued today.

## 2022-10-29 NOTE — ASSESSMENT & PLAN NOTE
ECG with atrial fibrillation. Patient reports he previous hx of atrial fib. Afib from OSH was documented as of early this month.   CHADSVASc 3.     - Comfort care. Pressor support discontinued

## 2022-10-29 NOTE — ASSESSMENT & PLAN NOTE
End stage heart failure with end stage renal failure unable to tolerate any CRRT due to hypotension  Comfort care. Pressor support discontinued

## 2022-10-30 NOTE — PLAN OF CARE
Cardiac ICU Care Plan    POC reviewed with Nayan Hernandez, although he is unable to communicate at this time. Pt on comfort care at this time.  Precedex infusing and Morphine administered PRN for dyspnea.  No acute events today.  See below and flowsheets for full assessment and VS info.       Neuro:  [place] Coma Scale  Best Eye Response: 2-->(E2) to pain  Best Motor Response: 4-->(M4) withdraws from pain  Best Verbal Response: 1-->(V1) none  [place] Coma Scale Score: 9  Assessment Qualifiers: patient chemically sedated or paralyzed  Pupil PERRLA: no    24 hr Temp:  [91.8 °F (33.2 °C)-97.9 °F (36.6 °C)]      CV:  Rhythm: normal sinus rhythm, conduction defect   DVT prophylaxis: VTE Required Core Measure: Provider determined low risk VTE        Pulses  Right Radial Pulse: 1+ (weak)  Left Radial Pulse: 1+ (weak)  Right Dorsalis Pedis Pulse: 1+ (weak)  Left Dorsalis Pedis Pulse: 1+ (weak)  Right Posterior Tibial Pulse: 1+ (weak)  Left Posterior Tibial Pulse: 1+ (weak)    Resp:  O2 Device (Oxygen Therapy): nasal cannula  Flow (L/min): 3  Oxygen Concentration (%): 36    GI/:  GI prophylaxis: None ordered, pt on comfort care  Diet/Nutrition Received: NPO  Last Bowel Movement: 10/24/22  Voiding Characteristics: external catheter   Intake/Output Summary (Last 24 hours) at 10/30/2022 1744  Last data filed at 10/30/2022 1705  Gross per 24 hour   Intake 746.59 ml   Output 45 ml   Net 701.59 ml         Labs/Accuchecks:  Recent Labs   Lab 10/25/22  0401 10/26/22  0345 10/27/22  0310   WBC 3.02* 4.58 5.82   RBC 2.70* 2.88* 2.69*   HGB 7.7* 8.2* 7.6*   HCT 24.2* 25.5* 24.1*   PLT 86* 89* 71*    No results for input(s): PT, INR, APTT in the last 168 hours. No results for input(s): NA, K, CO2, CL, BUN, CREATININE, ALKPHOS, ALT, AST, BILITOT in the last 72 hours.  No results for input(s): CPK, CPKMB, MB, TROPONINI in the last 168 hours. No results for input(s): PH, PCO2, PO2, HCO3, POCSATURATED, BE in the last 72  hours.        Gtts/LDAs:   sodium chloride 0.9% 5 mL/hr at 10/30/22 1705    dexmedetomidine (PRECEDEX) infusion 1.4 mcg/kg/hr (10/30/22 1705)       Lines/Drains/Airways       Central Venous Catheter Line  Duration                  Hemodialysis Catheter right femoral -- days    Percutaneous Central Line Insertion/Assessment - Triple Lumen  10/23/22 0106 right internal jugular 7 days              Drain  Duration             Male External Urinary Catheter 10/30/22 1655 Large <1 day              Arterial Line  Duration             Arterial Line 10/25/22 0329 Right Radial 5 days                    Skin/Wounds  Bathing/Skin Care: bath, complete;back care;electrode patches/site rotation;foot care;dressed/undressed;linen changed (10/30/22 1655)

## 2022-10-30 NOTE — PROGRESS NOTES
[facility]  Cardiology  Progress Note    Patient Name: Nayan Hernandez  MRN: [id number]  Admission Date: 10/21/2022  Hospital Length of Stay: 9 days  Code Status: DNR   Attending Physician: Zeny Grullon MD   Primary Care Physician: Eusebia Brady NP  Expected Discharge Date: 10/30/2022  Principal Problem:Cardiogenic shock    Subjective:     Hospital Course:   Mr. Hernandez presented to [facility] [place] from dialysis clinic for hypotensive episode during HD. Patient with known history of type B aortic aneurysm s/p repair in 2011 with CT CAP showing progressive aortic dissection extending to the right common iliac and left proximal internal iliac artery. Patient is not a candidate for repair given high mortality with procedure. Patient placed on dobutamine for concern of cardiogenic shock. He has a SVP consistently of 28. Patient unable to tolerate CRRT, now requiring , Epi, and Vaso. CXR with worsening pulmonary congestion. Per discussions with daughter (Kate), will make patient DNR but will not transition to comfort care at this time. Family at bedside and decision was made to transition to comfort care on 10/29.        Interval History: Patient remains altered on exam with MAPS 30s. Patient appears to be comfortable at this time.     Review of Systems   Reason unable to perform ROS: AMS.   Objective:     Vital Signs (Most Recent):  Temp: (!) 93.6 °F (34.2 °C) (10/30/22 1105)  Pulse: 60 (10/30/22 1105)  Resp: (!) 21 (10/30/22 1134)  BP: (!) 56/39 (10/30/22 1105)  SpO2: 99 % (10/30/22 1105)   Vital Signs (24h Range):  Temp:  [93.6 °F (34.2 °C)-99 °F (37.2 °C)] 93.6 °F (34.2 °C)  Pulse:  [60-74] 60  Resp:  [18-22] 21  SpO2:  [93 %-99 %] 99 %  BP: (52-56)/(36-39) 56/39  Arterial Line BP: (42-49)/(24-27) 48/26     Weight: 70.5 kg (155 lb 6.8 oz)  Body mass index is 24.34 kg/m².     SpO2: 99 %  O2 Device (Oxygen Therapy): nasal cannula      Intake/Output Summary (Last 24 hours) at 10/30/2022  1551  Last data filed at 10/30/2022 1105  Gross per 24 hour   Intake 616.94 ml   Output 15 ml   Net 601.94 ml       Physical Exam  Vitals reviewed.   Constitutional:       General: He is in acute distress.      Appearance: He is ill-appearing.   HENT:      Head:      Comments: Prominent vasculature noted. Elevated JVP  Cardiovascular:      Rate and Rhythm: Regular rhythm. Tachycardia present.      Pulses: Normal pulses.      Heart sounds: Normal heart sounds.   Pulmonary:      Breath sounds: Rhonchi and rales present. No wheezing.      Comments: Tachypnic  Abdominal:      General: Bowel sounds are normal. There is no distension.      Palpations: Abdomen is soft. There is no mass.   Musculoskeletal:         General: Swelling present.   Skin:     General: Skin is warm.      Coloration: Skin is not pale.      Findings: No erythema or rash.   Neurological:      Mental Status: He is disoriented.       Assessment and Plan:         * Cardiogenic shock  End stage heart failure with end stage renal failure  Comfort care. Pressor support discontinued    Paroxysmal atrial fibrillation  ECG with atrial fibrillation. Patient reports he previous hx of atrial fib. Afib from OSH was documented as of early this month.   CHADSVASc 3.     - Comfort care. Pressor support discontinued    Ascending aortic dissection  Patient with known chronic dissection involving the aortic arch and descending aorta. CTA this admit demonstrated below:   The CTA C/A/P demonstrated aortic dissection extending to the right common iliac and left proximal internal iliac artery with significant aneurysmal dilatation of the thoracic aorta and dissection flap involves the proximal right subclavian artery and also left renal artery.    - CTS evalauted the patient and deemed him a non-surgical candidate  - Comfort care. Pressor support discontinued today.     HFrEF (heart failure with reduced ejection fraction)  End stage heart failure with end stage renal  failure  Comfort care. Pressor support discontinued today.     ESRD (end stage renal disease)  End stage heart failure with end stage renal failure unable to tolerate any CRRT due to hypotension  Comfort care. Pressor support discontinued        VTE Risk Mitigation (From admission, onward)    None          Lenka Burrell MD  Cardiology  [facility]

## 2022-10-30 NOTE — SUBJECTIVE & OBJECTIVE
Interval History: Patient remains altered on exam with MAPS 30s. Patient appears to be comfortable at this time.     Review of Systems   Reason unable to perform ROS: AMS.   Objective:     Vital Signs (Most Recent):  Temp: (!) 93.6 °F (34.2 °C) (10/30/22 1105)  Pulse: 60 (10/30/22 1105)  Resp: (!) 21 (10/30/22 1134)  BP: (!) 56/39 (10/30/22 1105)  SpO2: 99 % (10/30/22 1105)   Vital Signs (24h Range):  Temp:  [93.6 °F (34.2 °C)-99 °F (37.2 °C)] 93.6 °F (34.2 °C)  Pulse:  [60-74] 60  Resp:  [18-22] 21  SpO2:  [93 %-99 %] 99 %  BP: (52-56)/(36-39) 56/39  Arterial Line BP: (42-49)/(24-27) 48/26     Weight: 70.5 kg (155 lb 6.8 oz)  Body mass index is 24.34 kg/m².     SpO2: 99 %  O2 Device (Oxygen Therapy): nasal cannula      Intake/Output Summary (Last 24 hours) at 10/30/2022 1551  Last data filed at 10/30/2022 1105  Gross per 24 hour   Intake 616.94 ml   Output 15 ml   Net 601.94 ml       Physical Exam  Vitals reviewed.   Constitutional:       General: He is in acute distress.      Appearance: He is ill-appearing.   HENT:      Head:      Comments: Prominent vasculature noted. Elevated JVP  Cardiovascular:      Rate and Rhythm: Regular rhythm. Tachycardia present.      Pulses: Normal pulses.      Heart sounds: Normal heart sounds.   Pulmonary:      Breath sounds: Rhonchi and rales present. No wheezing.      Comments: Tachypnic  Abdominal:      General: Bowel sounds are normal. There is no distension.      Palpations: Abdomen is soft. There is no mass.   Musculoskeletal:         General: Swelling present.   Skin:     General: Skin is warm.      Coloration: Skin is not pale.      Findings: No erythema or rash.   Neurological:      Mental Status: He is disoriented.

## 2022-10-31 NOTE — CARE UPDATE
Palliative care returned to bedside.  Patient .  No family present,  family notified per primary team.   Pal med will follow for bereavement.

## 2022-10-31 NOTE — DISCHARGE SUMMARY
[facility] - Cardiac Intensive Care  Cardiology  Discharge Summary      Patient Name: Nayan Hernandez  MRN: [id number]  Admission Date: 10/21/2022  Hospital Length of Stay: 10 days  Discharge Date and Time:  10/31/2022 11:52 AM  Attending Physician: Zeny Grullon MD    Discharging Provider: Lenka Burrell MD  Primary Care Physician: Eusebia Brady NP    HPI:   Nayan Hernandez is a 64 yo M hx of  HCV, HFrEF ( EF 10% w/ MR + TR), pHTN, HTN, HLD, ESRD MWF (within the past month) with permacath in right groin, pAF however not on AC, chronic Type B dissection s/p intervention 2011 ( now deemed inoperable for repeat surgery) who was admitted for hypotension. The patient is a poor historian. The patient presented from HD clinic for hypotensive episode that caused him to not be able to complete his HD. He reports dyspnea, PND for the past few days, associated with chronic LE edema. He denies lightheadedness. Of note, the patient had an infected PPM lead that was treated with abx due to high risk of extraction. He is well known to palliative care and was considering home based palliative program. He currently doesn't follow with cardiologist in the clinic. He is on midodrine 10mg TID for chronic hypotension. He is off GDMT.     Upon arrival to the ED, his BP improved wit 100/70s with 500cc of IVF. A CTA C/A/P was obtained due to enlarging aortic aneurysm on CXR. The CTA C/A/P demonstrated aortic dissection extending to the right common iliac and left proximal internal iliac artery with significant aneurysmal dilatation of the thoracic aorta and dissection flap involves the proximal right subclavian artery and also left renal artery. CTS evalauted the patient and deemed him inoperable as these findings aren't a surgical indication at this time, as well as concern that a redo operation on his arch would likely be fatal. Also demeed him not a candidate for advanced options given co-morbidities.  Lactic was 2.8 on admit  that increased to 3.3 and worsened to 5. He completed HD with 2.5L of volume removal. Cardiology was re consulted for concerning of worsening lactic acidosis. Upon my evaluation, he appeared worse than prior, reported dyspnea and generalized body discomfort and cool hands. The patient was transferred to CICU for closer monitoring.       * No surgery found *     Indwelling Lines/Drains at time of discharge:  Lines/Drains/Airways     Central Venous Catheter Line  Duration                Hemodialysis Catheter right femoral -- days    Percutaneous Central Line Insertion/Assessment - Triple Lumen  10/23/22 0106 right internal jugular 8 days          Drain  Duration           Male External Urinary Catheter 10/30/22 1655 Large <1 day          Arterial Line  Duration           Arterial Line 10/25/22 0329 Right Radial 6 days                Hospital Course:  Mr. Hernandez presented to [facility] from dialysis clinic for hypotensive episode during HD. Patient with known history of type B aortic aneurysm s/p repair in 2011 with CT CAP showing progressive aortic dissection extending to the right common iliac and left proximal internal iliac artery. Patient is not a candidate for repair given high mortality with procedure. Patient placed on dobutamine for concern of cardiogenic shock. He has a SVP consistently of 28. Patient unable to tolerate CRRT, now requiring , Epi, and Vaso. CXR with worsening pulmonary congestion. Per discussions with daughter (Kate), will make patient DNR but will not transition to comfort care at this time. Family at bedside and decision was made to transition to comfort care on 10/29.        Goals of Care Treatment Preferences:  Code Status: DNR    Health care agent: Kate Dickey  [facility] agent number: [phone]                   Consults:   Consults (From admission, onward)        Status Ordering Provider     Inpatient consult to Palliative Care  Once        Provider:  (Not yet assigned)    Completed  ANDRÉS SOSA     Inpatient consult to Nephrology  Once        Provider:  (Not yet assigned)    Completed CHIN SELBY     Inpatient consult to Cardiology  Once        Provider:  (Not yet assigned)    Completed PETER WRAY     Inpatient consult to Nephrology  Once        Provider:  (Not yet assigned)    Completed PETER WRAY     Inpatient consult to Critical Care Medicine  Once        Provider:  (Not yet assigned)    Completed ANDREA BELLO     Inpatient consult to Cardiothoracic Surgery  Once        Provider:  (Not yet assigned)    Completed ANDREA BELLO     Inpatient consult to Nephrology  Once        Provider:  (Not yet assigned)    Completed ANDREA BELLO          Significant Diagnostic Studies: Labs: All labs within the past 24 hours have been reviewed    Pending Diagnostic Studies:     Procedure Component Value Units Date/Time    Calcium, ionized [[id number]] Collected: 10/24/22 2020    Order Status: Sent Lab Status: In process Updated: 10/24/22 2020    Specimen: Blood     Calcium, ionized [[id number]] Collected: 10/24/22 1617    Order Status: Sent Lab Status: In process Updated: 10/24/22 1617    Specimen: Blood     Vancomycin, Random [[id number]] Collected: 10/24/22 2020    Order Status: Sent Lab Status: In process Updated: 10/24/22 2020    Specimen: Blood           Final Active Diagnoses:    Diagnosis Date Noted POA    PRINCIPAL PROBLEM:  Cardiogenic shock [R57.0] 10/21/2022 Yes    Comfort measures only status [Z51.5] 10/29/2022 Not Applicable    Palliative care encounter [Z51.5] 10/24/2022 Not Applicable    Advanced care planning/counseling discussion [Z71.89] 10/24/2022 Not Applicable    Goals of care, counseling/discussion [Z71.89] 10/24/2022 Not Applicable    Paroxysmal atrial fibrillation [I48.0] 10/22/2022 Yes    ESRD (end stage renal disease) [N18.6] 10/21/2022 Yes    HFrEF (heart failure with reduced ejection fraction) [I50.20] 10/21/2022 Yes    Ascending  aortic dissection [I71.010] 10/21/2022 Yes    Dissecting aneurysm of thoracic aorta, [place] type B [I71.012, I71.23] 10/21/2022 Yes    Anemia due to chronic kidney disease [N18.9, D63.1] 10/21/2022 Yes    Lactic acidosis [E87.20] 10/21/2022 Yes    Volume overload [E87.70] 10/21/2022 Yes    Scrotal edema [N50.89] 10/21/2022 Yes    Hypokalemia [E87.6] 2017 Unknown    Tobacco abuse [Z72.0] 2014 Yes    Thrombocytopenia [D69.6] 2014 Yes    Hypertension [I10] 12/15/2014 Yes    CAD (coronary artery disease) [I25.10]  Yes    HTN (hypertension) [I10]  Yes      Problems Resolved During this Admission:    Diagnosis Date Noted Date Resolved POA    Ascending aortic aneurysm [I71.21] 10/24/2022 10/27/2022 Yes    Hypotension [I95.9] 10/24/2022 10/27/2022 Yes    Acute on chronic systolic heart failure [I50.23] 10/22/2022 10/27/2022 Yes    Acute hypoxemic respiratory failure [J96.01] 2014 10/21/2022 Yes    Hepatitis C [B19.20] 2014 10/21/2022 Yes     No new Assessment & Plan notes have been filed under this hospital service since the last note was generated.  Service: Cardiology      Discharged Condition:     Disposition:     Follow Up:    Patient Instructions:   No discharge procedures on file.  Medications:  None      Lenka Burrell MD  Cardiology  [facility] - Cardiac Intensive Care

## 2022-10-31 NOTE — PLAN OF CARE
[facility] - Cardiac [facility]  Discharge Final Note    Primary Care Provider: Eusebia Brady NP    Expected Discharge Date: 10/31/2022    Final Discharge Note (most recent)       Final Note - 10/31/22 1458          Final Note    Assessment Type Final Discharge Note     Anticipated Discharge Disposition                    Celine Coello LMSW  [facility]  [id number]

## 2022-11-01 LAB
POCT GLUCOSE: 108 MG/DL (ref 70–110)
POCT GLUCOSE: 94 MG/DL (ref 70–110)